# Patient Record
Sex: FEMALE | Race: WHITE | Employment: FULL TIME | URBAN - METROPOLITAN AREA
[De-identification: names, ages, dates, MRNs, and addresses within clinical notes are randomized per-mention and may not be internally consistent; named-entity substitution may affect disease eponyms.]

---

## 2022-02-28 DIAGNOSIS — M25.552 LEFT HIP PAIN: Primary | ICD-10-CM

## 2022-02-28 NOTE — TELEPHONE ENCOUNTER
DIAGNOSIS: left hip pain, a little back pain associated with it/ self ref/ no imaging   APPOINTMENT DATE: 3.2.22   NOTES STATUS DETAILS

## 2022-03-02 ENCOUNTER — OFFICE VISIT (OUTPATIENT)
Dept: ORTHOPEDICS | Facility: CLINIC | Age: 52
End: 2022-03-02
Payer: COMMERCIAL

## 2022-03-02 ENCOUNTER — PRE VISIT (OUTPATIENT)
Dept: ORTHOPEDICS | Facility: CLINIC | Age: 52
End: 2022-03-02

## 2022-03-02 ENCOUNTER — ANCILLARY PROCEDURE (OUTPATIENT)
Dept: GENERAL RADIOLOGY | Facility: CLINIC | Age: 52
End: 2022-03-02
Attending: FAMILY MEDICINE
Payer: COMMERCIAL

## 2022-03-02 DIAGNOSIS — M25.552 LEFT HIP PAIN: ICD-10-CM

## 2022-03-02 DIAGNOSIS — M16.12 PRIMARY OSTEOARTHRITIS OF LEFT HIP: Primary | ICD-10-CM

## 2022-03-02 PROCEDURE — 99203 OFFICE O/P NEW LOW 30 MIN: CPT | Performed by: FAMILY MEDICINE

## 2022-03-02 PROCEDURE — 73502 X-RAY EXAM HIP UNI 2-3 VIEWS: CPT | Performed by: RADIOLOGY

## 2022-03-02 RX ORDER — NAPROXEN 500 MG/1
500 TABLET ORAL 2 TIMES DAILY WITH MEALS
Qty: 60 TABLET | Refills: 0 | Status: ON HOLD | OUTPATIENT
Start: 2022-03-02 | End: 2022-05-05

## 2022-03-02 RX ORDER — OMEGA-3 FATTY ACIDS/FISH OIL 300-1000MG
200 CAPSULE ORAL EVERY 4 HOURS PRN
Status: ON HOLD | COMMUNITY
End: 2022-05-05

## 2022-03-02 NOTE — LETTER
Date:March 3, 2022      Patient was self referred, no letter generated. Do not send.        [facility] Health Information

## 2022-03-02 NOTE — PROGRESS NOTES
Sports Medicine Clinic Visit    PCP: No primary care provider on file.    Milvia Carrasquillo is a 51 year old female who is seen  as self referral presenting with left hip pain. She reports that as a child she did injure her left hip, but is unaware of details, and did not grow up with hip disability or discomfort as young adult.  She is from [place].    Patient indicates for the last year she has had increasing left-sided groin discomfort with weightbearing, and difficulties getting out of a car to weight bear on the left hip.  Feels more prominent in the last 2 to 3 months.  She mentions over the last 3 to 4 years she has had intermittent left-sided groin pain, but usually short-lived.  She has noted for the past few years that  its more difficult to rotate her hip to get her socks on.  She was seen by primary provider in 2018 for left-sided buttock discomfort and diagnosed with piriformis syndrome.  She has seen a chiropractor without improvement in her pain.    She has tried no over-the-counter pain medicines.  She has no history of stomach intolerance of medicines or of kidney disease.    Injury: None.     Location of Pain: left hip  Duration of Pain: 2-3  Month(s) pain has worsen.   Rating of Pain: 10/10  Pain is better with: chiropractic care  Pain is worse with: walking.   Treatment so far consists of: Ibuprofen, Rest and chiropractic care.       There were no vitals taken for this visit.          PMH:  Hypertension   Insulin resistance    Non morbid obesity due to excess calories    Piriformis syndrome of left side 2018  H/o c/section x 2  Carpal tunnel syndrome, right      Active problem list:  There is no problem list on file for this patient.      FH:  No family history on file.    SH:  Social History     Socioeconomic History     Marital status:      Spouse name: Not on file     Number of children: Not on file     Years of education: Not on file     Highest education level: Not on file    Occupational History     Not on file   Tobacco Use     Smoking status: Not on file     Smokeless tobacco: Not on file   Substance and Sexual Activity     Alcohol use: Not on file     Drug use: Not on file     Sexual activity: Not on file   Other Topics Concern     Not on file   Social History Narrative     Not on file     Social Determinants of Health     Financial Resource Strain: Not on file   Food Insecurity: Not on file   Transportation Needs: Not on file   Physical Activity: Not on file   Stress: Not on file   Social Connections: Not on file   Intimate Partner Violence: Not on file   Housing Stability: Not on file       MEDS:  See EMR, reviewed  ALL:  See EMR, reviewed    REVIEW OF SYSTEMS:  CONSTITUTIONAL:NEGATIVE for fever, chills, change in weight  INTEGUMENTARY/SKIN: NEGATIVE for worrisome rashes, moles or lesions  EYES: NEGATIVE for vision changes or irritation  ENT/MOUTH: NEGATIVE for ear, mouth and throat problems  RESP:NEGATIVE for significant cough or SOB  BREAST: NEGATIVE for masses, tenderness or discharge  CV: NEGATIVE for chest pain, palpitations or peripheral edema  GI: NEGATIVE for nausea, abdominal pain, heartburn, or change in bowel habits  :NEGATIVE for frequency, dysuria, or hematuria  :NEGATIVE for frequency, dysuria, or hematuria  NEURO: NEGATIVE for weakness, dizziness or paresthesias  ENDOCRINE: NEGATIVE for temperature intolerance, skin/hair changes  HEME/ALLERGY/IMMUNE: NEGATIVE for bleeding problems  PSYCHIATRIC: NEGATIVE for changes in mood or affect      Objective she has normal range of motion to the right hip to internal rotation, external rotation and abduction.  She has severely limited range of motion at the left hip with discomfort of both internal and external rotation.  Straight leg raise is negative.  Strength is intact at hip, knee, ankle and foot.  Appropriate in conversation and affect.    We went over x-rays of her left hip that show severe degenerative joint  disease.  She appears to have an occluder device in her bilateral fallopian tubes.      Assessment: Severe left-sided hip DJD    Plan: She would like to discuss options with the hip surgeon.  Orthopedic surgery referral placed.  She would like to try some naproxen, 500 mg taken with food, twice daily.

## 2022-03-02 NOTE — LETTER
3/2/2022      RE: Milvia Carrasquillo  [address]  [facility] [address]       Sports Medicine Clinic Visit    PCP: No primary care provider on file.    Milvia Carrasquillo is a 51 year old female who is seen  as self referral presenting with left hip pain. She reports that as a child she did injure her left hip, but is unaware of details, and did not grow up with hip disability or discomfort as young adult.  She is from [place].    Patient indicates for the last year she has had increasing left-sided groin discomfort with weightbearing, and difficulties getting out of a car to weight bear on the left hip.  Feels more prominent in the last 2 to 3 months.  She mentions over the last 3 to 4 years she has had intermittent left-sided groin pain, but usually short-lived.  She has noted for the past few years that  its more difficult to rotate her hip to get her socks on.  She was seen by primary provider in 2018 for left-sided buttock discomfort and diagnosed with piriformis syndrome.  She has seen a chiropractor without improvement in her pain.    She has tried no over-the-counter pain medicines.  She has no history of stomach intolerance of medicines or of kidney disease.    Injury: None.     Location of Pain: left hip  Duration of Pain: 2-3  Month(s) pain has worsen.   Rating of Pain: 10/10  Pain is better with: chiropractic care  Pain is worse with: walking.   Treatment so far consists of: Ibuprofen, Rest and chiropractic care.       There were no vitals taken for this visit.          PMH:  Hypertension   Insulin resistance    Non morbid obesity due to excess calories    Piriformis syndrome of left side 2018  H/o c/section x 2  Carpal tunnel syndrome, right      Active problem list:  There is no problem list on file for this patient.      FH:  No family history on file.    SH:  Social History     Socioeconomic History     Marital status:      Spouse name: Not on file     Number of children:  Not on file     Years of education: Not on file     Highest education level: Not on file   Occupational History     Not on file   Tobacco Use     Smoking status: Not on file     Smokeless tobacco: Not on file   Substance and Sexual Activity     Alcohol use: Not on file     Drug use: Not on file     Sexual activity: Not on file   Other Topics Concern     Not on file   Social History Narrative     Not on file     Social Determinants of Health     Financial Resource Strain: Not on file   Food Insecurity: Not on file   Transportation Needs: Not on file   Physical Activity: Not on file   Stress: Not on file   Social Connections: Not on file   Intimate Partner Violence: Not on file   Housing Stability: Not on file       MEDS:  See EMR, reviewed  ALL:  See EMR, reviewed    REVIEW OF SYSTEMS:  CONSTITUTIONAL:NEGATIVE for fever, chills, change in weight  INTEGUMENTARY/SKIN: NEGATIVE for worrisome rashes, moles or lesions  EYES: NEGATIVE for vision changes or irritation  ENT/MOUTH: NEGATIVE for ear, mouth and throat problems  RESP:NEGATIVE for significant cough or SOB  BREAST: NEGATIVE for masses, tenderness or discharge  CV: NEGATIVE for chest pain, palpitations or peripheral edema  GI: NEGATIVE for nausea, abdominal pain, heartburn, or change in bowel habits  :NEGATIVE for frequency, dysuria, or hematuria  :NEGATIVE for frequency, dysuria, or hematuria  NEURO: NEGATIVE for weakness, dizziness or paresthesias  ENDOCRINE: NEGATIVE for temperature intolerance, skin/hair changes  HEME/ALLERGY/IMMUNE: NEGATIVE for bleeding problems  PSYCHIATRIC: NEGATIVE for changes in mood or affect      Objective she has normal range of motion to the right hip to internal rotation, external rotation and abduction.  She has severely limited range of motion at the left hip with discomfort of both internal and external rotation.  Straight leg raise is negative.  Strength is intact at hip, knee, ankle and foot.  Appropriate in conversation and  affect.    We went over x-rays of her left hip that show severe degenerative joint disease.  She appears to have an occluder device in her bilateral fallopian tubes.      Assessment: Severe left-sided hip DJD    Plan: She would like to discuss options with the hip surgeon.  Orthopedic surgery referral placed.  She would like to try some naproxen, 500 mg taken with food, twice daily.                  Joe Franklin MD

## 2022-03-03 ENCOUNTER — ANCILLARY PROCEDURE (OUTPATIENT)
Dept: GENERAL RADIOLOGY | Facility: CLINIC | Age: 52
End: 2022-03-03
Payer: COMMERCIAL

## 2022-03-03 ENCOUNTER — OFFICE VISIT (OUTPATIENT)
Dept: ORTHOPEDICS | Facility: CLINIC | Age: 52
End: 2022-03-03
Payer: COMMERCIAL

## 2022-03-03 ENCOUNTER — PRE VISIT (OUTPATIENT)
Dept: ORTHOPEDICS | Facility: CLINIC | Age: 52
End: 2022-03-03
Payer: COMMERCIAL

## 2022-03-03 ENCOUNTER — LAB (OUTPATIENT)
Dept: LAB | Facility: CLINIC | Age: 52
End: 2022-03-03
Payer: COMMERCIAL

## 2022-03-03 VITALS — HEIGHT: 65 IN | BODY MASS INDEX: 33.82 KG/M2 | WEIGHT: 203 LBS

## 2022-03-03 DIAGNOSIS — M16.12 PRIMARY OSTEOARTHRITIS OF LEFT HIP: ICD-10-CM

## 2022-03-03 DIAGNOSIS — M16.12 PRIMARY OSTEOARTHRITIS OF LEFT HIP: Primary | ICD-10-CM

## 2022-03-03 PROCEDURE — 72170 X-RAY EXAM OF PELVIS: CPT | Performed by: RADIOLOGY

## 2022-03-03 PROCEDURE — U0005 INFEC AGEN DETEC AMPLI PROBE: HCPCS | Mod: 90 | Performed by: PATHOLOGY

## 2022-03-03 PROCEDURE — 99204 OFFICE O/P NEW MOD 45 MIN: CPT | Mod: GC | Performed by: ORTHOPAEDIC SURGERY

## 2022-03-03 PROCEDURE — U0003 INFECTIOUS AGENT DETECTION BY NUCLEIC ACID (DNA OR RNA); SEVERE ACUTE RESPIRATORY SYNDROME CORONAVIRUS 2 (SARS-COV-2) (CORONAVIRUS DISEASE [COVID-19]), AMPLIFIED PROBE TECHNIQUE, MAKING USE OF HIGH THROUGHPUT TECHNOLOGIES AS DESCRIBED BY CMS-2020-01-R: HCPCS | Mod: 90 | Performed by: PATHOLOGY

## 2022-03-03 PROCEDURE — 99000 SPECIMEN HANDLING OFFICE-LAB: CPT | Performed by: PATHOLOGY

## 2022-03-03 ASSESSMENT — KOOS JR
HOW SEVERE IS YOUR KNEE STIFFNESS AFTER FIRST WAKING IN MORNING: SEVERE
RISING FROM SITTING: MODERATE
GOING UP OR DOWN STAIRS: SEVERE
BENDING TO THE FLOOR TO PICK UP OBJECT: MILD
TWISING OR PIVOTING ON KNEE: SEVERE
KOOS JR SCORING: 50.01
STANDING UPRIGHT: SEVERE

## 2022-03-03 NOTE — NURSING NOTE
Reason For Visit:   Chief Complaint   Patient presents with     Consult     1 year of left hip pain. No injury. No injections.       There were no vitals taken for this visit.         Jeniffer Marshall, ATC

## 2022-03-03 NOTE — NURSING NOTE
Teaching Flowsheet   Relevant Diagnosis: L GOVIND  Teaching Topic: L GOVIND     RN Note: Pt and  are calm and cooperative, asking questions appropriately. Pt was instructed on pre-surgical packet requirements including H&P, COVID-19 test, NPO, and pre-surgical scrub. Pt and  verbalized understanding. Pt will schedule H&P tomorrow c PCP, COVID test at clinic, packet and scrub given to pt. Pt would like to have surgery on Monday 3/7/22 c Dr. Genao.    Person(s) involved in teaching:   Patient and      Motivation Level:  Asks Questions: Yes  Eager to Learn: Yes  Cooperative: Yes  Receptive (willing/able to accept information): Yes  Any cultural factors/Latter day beliefs that may influence understanding or compliance? No     Patient demonstrates understanding of the following:  Reason for the appointment, diagnosis and treatment plan: Yes  Knowledge of proper use of medications and conditions for which they are ordered (with special attention to potential side effects or drug interactions): Yes  Which situations necessitate calling provider and whom to contact: Yes    Proper use and care of (medical equip, care aids, etc.): Yes  Nutritional needs and diet plan: Yes  Pain management techniques: Yes  Wound Care: Yes  How and/when to access community resources: Yes    Jose Juan Garvey, RNCC

## 2022-03-03 NOTE — LETTER
Date:April 6, 2022      Patient was self referred, no letter generated. Do not send.        [facility] Health Information

## 2022-03-03 NOTE — LETTER
3/3/2022         RE: Milvia Carrasquillo  [address] [place] [address]        Dear Colleague,    Thank you for referring your patient, Milvia Carrasquillo, to the [facility] [place]. Please see a copy of my visit note below.    Orthopaedic Surgery History & Physical  March 3, 2022    REFERRING PHYSICIAN: Referred Self   PRIMARY CARE PHYSICIAN: No primary care provider on file.     Chief Complaint:   Consult (1 year of left hip pain. No injury. No injections.)      History of Present Illness:     Milvia Carrasquillo is a 51 year old Malian-speaking female w/ BMI 34 who presents for evaluation of left hip pain.     She has had left hip pain for the last 3 to 4 years.  It is worsened over the last year, severely so in the last 2 to 3 months.  The pain is localized to her posterior buttock and anterior groin.  The pain does not radiate.  At its worst it is 10 out of 10 in severity.  It is worse with activity and better with rest.  Treatment at this time has included working with a chiropractor for piriformis syndrome.  She has not had any injections or surgery on the hip.  She has a history of left hip injury remotely as a child, though she does not recall the circumstances of this and did not have any surgery.    Is a nonsmoker, works as the  at a . HgbA1c was 6.0 in 2018.      Past Medical History:   No past medical history on file.    Past Surgical History:   No past surgical history on file.    Social History:     Social History     Tobacco Use     Smoking status: Never Smoker     Smokeless tobacco: Never Used   Substance Use Topics     Alcohol use: Not on file       Family History:   No family history on file.    Allergies:   No Known Allergies    Medications:     Current Outpatient Medications   Medication     ibuprofen (ADVIL/MOTRIN) 200 MG capsule     naproxen (NAPROSYN) 500 MG tablet     No current facility-administered  "medications for this visit.        Review of Systems:     A 10 point ROS was performed and reviewed. Specific responses to these questions are noted at the end of the document.    Physical Exam:   On physical examination the patient appears the stated age, is in no acute distress, affec is appropriate, and breathing is non-labored.  Vitals are documented in the EMR and have been reviewed:  Ht 1.638 m (5' 4.5\")   Wt 92.1 kg (203 lb)   BMI 34.31 kg/m      Rises from chair: yes  Gait: antalgic with shortened L-sided stance phase  Trendelenburg test: yes  Gains the exam table: yes    RIGHT hip subjective:  Abd: 30  Add: 30  Flexion: 120  IRF: 30  ERF: 45    LEFT hip subjective:  Flexion: <90 limited by pain  IRF: 20  ERF: 20  Palpable DP/PT pulses  Fires EHL/FHL/TA/GSC    Imaging:   AP pelvis and left hip AP and frog-leg lateral was obtained yesterday and reviewed today in clinic.  The patient has severe Tonnis grade 3 left hip OA with superior migration and asphericity of the femoral head as well as obliteration of the joint space, bone cyst formation, and osteophyte formation.    Assessment and Plan:   Assessment:  51 year old female with L hip severe end-stage OA. Discussed op vs nonop management. Patient interested in pursuing surgery in the form of GOVIND.     Plan:   Will schedule for L GOVIND.    Seen and d/w Dr. Chadwick Mccullough MD  Orthopaedic Surgery, PGY-4  Pager: [phone]      We spent twenty minutes discussing total hip arthroplasty.  We discussed the implants, the procedure, the risks and benefits, and the post-operative course.  We discussed blood clots, blood clots to the lungs, injury to blood vessels and nerves, dislocation, infection, and leg length difference.  All the patients questions were answered to the best of my ability.    I have personally examined this patient and have reviewed the clinical presentation and progress note with the resident. I agree with the treatment plan as outlined. " The plan was formulated with the resident on the day of the resident's dictation.          Again, thank you for allowing me to participate in the care of your patient.        Sincerely,        Joe Genao MD

## 2022-03-03 NOTE — PROGRESS NOTES
Orthopaedic Surgery History & Physical  March 3, 2022    REFERRING PHYSICIAN: Referred Self   PRIMARY CARE PHYSICIAN: No primary care provider on file.     Chief Complaint:   Consult (1 year of left hip pain. No injury. No injections.)      History of Present Illness:     Milvia Carrasquillo is a 51 year old Greenlandic-speaking female w/ BMI 34 who presents for evaluation of left hip pain.     She has had left hip pain for the last 3 to 4 years.  It is worsened over the last year, severely so in the last 2 to 3 months.  The pain is localized to her posterior buttock and anterior groin.  The pain does not radiate.  At its worst it is 10 out of 10 in severity.  It is worse with activity and better with rest.  Treatment at this time has included working with a chiropractor for piriformis syndrome.  She has not had any injections or surgery on the hip.  She has a history of left hip injury remotely as a child, though she does not recall the circumstances of this and did not have any surgery.    Is a nonsmoker, works as the  at a . HgbA1c was 6.0 in 2018.      Past Medical History:   No past medical history on file.    Past Surgical History:   No past surgical history on file.    Social History:     Social History     Tobacco Use     Smoking status: Never Smoker     Smokeless tobacco: Never Used   Substance Use Topics     Alcohol use: Not on file       Family History:   No family history on file.    Allergies:   No Known Allergies    Medications:     Current Outpatient Medications   Medication     ibuprofen (ADVIL/MOTRIN) 200 MG capsule     naproxen (NAPROSYN) 500 MG tablet     No current facility-administered medications for this visit.        Review of Systems:     A 10 point ROS was performed and reviewed. Specific responses to these questions are noted at the end of the document.    Physical Exam:   On physical examination the patient appears the stated age, is in no acute distress, affec  "is appropriate, and breathing is non-labored.  Vitals are documented in the EMR and have been reviewed:  Ht 1.638 m (5' 4.5\")   Wt 92.1 kg (203 lb)   BMI 34.31 kg/m      Rises from chair: yes  Gait: antalgic with shortened L-sided stance phase  Trendelenburg test: yes  Gains the exam table: yes    RIGHT hip subjective:  Abd: 30  Add: 30  Flexion: 120  IRF: 30  ERF: 45    LEFT hip subjective:  Flexion: <90 limited by pain  IRF: 20  ERF: 20  Palpable DP/PT pulses  Fires EHL/FHL/TA/GSC    Imaging:   AP pelvis and left hip AP and frog-leg lateral was obtained yesterday and reviewed today in clinic.  The patient has severe Tonnis grade 3 left hip OA with superior migration and asphericity of the femoral head as well as obliteration of the joint space, bone cyst formation, and osteophyte formation.    Assessment and Plan:   Assessment:  51 year old female with L hip severe end-stage OA. Discussed op vs nonop management. Patient interested in pursuing surgery in the form of GOVIND.     Plan:   Will schedule for L GOVIND.    Seen and d/w Dr. Chadwick Mccullough MD  Orthopaedic Surgery, PGY-4  Pager: [phone]    "

## 2022-03-04 ENCOUNTER — TELEPHONE (OUTPATIENT)
Dept: ORTHOPEDICS | Facility: CLINIC | Age: 52
End: 2022-03-04
Payer: COMMERCIAL

## 2022-03-04 DIAGNOSIS — Z11.59 ENCOUNTER FOR SCREENING FOR OTHER VIRAL DISEASES: Primary | ICD-10-CM

## 2022-03-04 LAB — SARS-COV-2 RNA RESP QL NAA+PROBE: NEGATIVE

## 2022-03-04 NOTE — TELEPHONE ENCOUNTER
RN called pt's  to discuss upcoming surgery. RN explained that unfortunately, there was no OR time for Mon (3/7). However, there is OR time for (3/30). Pt elected to take the 30th. Pt has a H&P c PCP on 3/7. Will reschedule a COVID test 3-4 days before surgery.     Jose Juan Garvey, JANAYCC

## 2022-03-07 ENCOUNTER — OFFICE VISIT (OUTPATIENT)
Dept: INTERNAL MEDICINE | Facility: CLINIC | Age: 52
End: 2022-03-07
Payer: COMMERCIAL

## 2022-03-07 ENCOUNTER — HOSPITAL ENCOUNTER (OUTPATIENT)
Dept: CARDIOLOGY | Facility: CLINIC | Age: 52
Discharge: HOME | End: 2022-03-07
Attending: INTERNAL MEDICINE | Admitting: INTERNAL MEDICINE
Payer: COMMERCIAL

## 2022-03-07 ENCOUNTER — LAB (OUTPATIENT)
Dept: LAB | Facility: CLINIC | Age: 52
End: 2022-03-07
Payer: COMMERCIAL

## 2022-03-07 VITALS
WEIGHT: 200 LBS | OXYGEN SATURATION: 98 % | BODY MASS INDEX: 34.15 KG/M2 | DIASTOLIC BLOOD PRESSURE: 99 MMHG | RESPIRATION RATE: 16 BRPM | HEART RATE: 81 BPM | HEIGHT: 64 IN | SYSTOLIC BLOOD PRESSURE: 196 MMHG

## 2022-03-07 DIAGNOSIS — R03.0 ELEVATED BP WITHOUT DIAGNOSIS OF HYPERTENSION: ICD-10-CM

## 2022-03-07 DIAGNOSIS — Z01.818 PRE-OPERATIVE EXAMINATION: ICD-10-CM

## 2022-03-07 DIAGNOSIS — Z01.818 PRE-OPERATIVE EXAMINATION: Primary | ICD-10-CM

## 2022-03-07 LAB
ANION GAP SERPL CALCULATED.3IONS-SCNC: 9 MMOL/L (ref 3–14)
ATRIAL RATE - MUSE: 83 BPM
BUN SERPL-MCNC: 15 MG/DL (ref 7–30)
CALCIUM SERPL-MCNC: 9.4 MG/DL (ref 8.5–10.1)
CHLORIDE BLD-SCNC: 103 MMOL/L (ref 94–109)
CO2 SERPL-SCNC: 25 MMOL/L (ref 20–32)
CREAT SERPL-MCNC: 0.77 MG/DL (ref 0.52–1.04)
DIASTOLIC BLOOD PRESSURE - MUSE: NORMAL MMHG
GFR SERPL CREATININE-BSD FRML MDRD: >90 ML/MIN/1.73M2
GLUCOSE BLD-MCNC: 85 MG/DL (ref 70–99)
HCG UR QL: NEGATIVE
INTERPRETATION ECG - MUSE: NORMAL
P AXIS - MUSE: 31 DEGREES
POTASSIUM BLD-SCNC: 4.5 MMOL/L (ref 3.4–5.3)
PR INTERVAL - MUSE: 170 MS
QRS DURATION - MUSE: 84 MS
QT - MUSE: 378 MS
QTC - MUSE: 444 MS
R AXIS - MUSE: 19 DEGREES
SODIUM SERPL-SCNC: 137 MMOL/L (ref 133–144)
SYSTOLIC BLOOD PRESSURE - MUSE: NORMAL MMHG
T AXIS - MUSE: 102 DEGREES
TSH SERPL DL<=0.005 MIU/L-ACNC: 2.32 MU/L (ref 0.4–4)
VENTRICULAR RATE- MUSE: 83 BPM

## 2022-03-07 PROCEDURE — 93788 AMBL BP MNTR W/SW A/R: CPT

## 2022-03-07 PROCEDURE — 93790 AMBL BP MNTR W/SW I&R: CPT | Performed by: INTERNAL MEDICINE

## 2022-03-07 PROCEDURE — 36415 COLL VENOUS BLD VENIPUNCTURE: CPT | Performed by: PATHOLOGY

## 2022-03-07 PROCEDURE — 93786 AMBL BP MNTR W/SW REC ONLY: CPT

## 2022-03-07 PROCEDURE — 99204 OFFICE O/P NEW MOD 45 MIN: CPT | Performed by: INTERNAL MEDICINE

## 2022-03-07 PROCEDURE — 84443 ASSAY THYROID STIM HORMONE: CPT | Performed by: PATHOLOGY

## 2022-03-07 PROCEDURE — 80048 BASIC METABOLIC PNL TOTAL CA: CPT | Performed by: PATHOLOGY

## 2022-03-07 PROCEDURE — 81025 URINE PREGNANCY TEST: CPT | Performed by: PATHOLOGY

## 2022-03-07 ASSESSMENT — PAIN SCALES - GENERAL: PAINLEVEL: SEVERE PAIN (7)

## 2022-03-07 NOTE — LETTER
3/7/2022      RE: Milvia Carrasquillo  [address] [place] [address]       Milvia Carrasquillo is 51 year old female here at the request of Dr. Genao for cardiovascular, pulmonary, and perioperative risk assessment prior to surgery.The intended surgical procedure is total hip arthroplasty. A copy of this note will be sent to the surgeon.    PMH  Hypertension with past treatment, none now (stopped 8 yrs ago). HTN during pregnancy.  Seasonal allergies    PAST SURGICAL HISTORY   Section X 2- no complications      Current Outpatient Medications   Medication     ibuprofen (ADVIL/MOTRIN) 200 MG capsule     naproxen (NAPROSYN) 500 MG tablet     No current facility-administered medications for this visit.       There is no immunization history on file for this patient.    This is a MODERATE risk surgery.      HPI:   Reason for surgery: progressive pain and limited ROM/fxn of hip due to osteoarthritis    Cardiovascular Risk:  This patient ambulates without assist. without chest pain. She IS able to climb a flight of stairs without chest pain.    The patient does not have chest pain with exertion    She does not have a history of known cardiac disease.   The patient does not have a history of stroke, and does not have a history of valvular disease.    Pulmonary Risk:  In terms of risk factors for pulmonary complications, the patient does not have a history of lung disease or symptoms. Never smoker.     Perioperative Complications:  The patient does not have a history of bleeding or clotting problems in the past. The patient has not had complications from past surgeries.  The patient does not have a family history of any anesthesia or surgical complications.      ROS:  Constitutional: no fevers, night sweats or unintentional weight change   Eyes: no vision change, diplopia or red eyes   Ears, Nose, Mouth, Throat: no tinnitus or hearing change, no epistaxis or nasal discharge, no oral lesions, throat  "clear   Cardiovascular: no chest pain, palpitations, or pain with walking, no orthopnea or PND   Respiratory: no dyspnea, cough, shortness of breath or wheezing - loud snoring at night  GI: no nausea, vomiting, diarrhea or constipation, no abdominal pain   : no change in urine, no dysuria or hematuria  Musculoskeletal: hip pain   Integumentary: no concerning lesions or moles   Neuro: no loss of strength or sensation, no numbness or tingling, no tremor, no dizziness, no headache   Endo: no polyuria or polydipsia, no temperature intolerance   Heme/Lymph: no concerning bumps, no bleeding problems   Allergy: no environmental allergies   Psych: no depression or anxiety, no sleep problems      PHYSICAL EXAM:  BP (!) 197/96 (BP Location: Right arm, Patient Position: Sitting, Cuff Size: Adult Regular)   Pulse 88   Resp 16   Ht 1.626 m (5' 4\")   Wt 90.7 kg (200 lb)   SpO2 98%   BMI 34.33 kg/m      Wt Readings from Last 1 Encounters:   03/07/22 90.7 kg (200 lb)       Constitutional: no distress, comfortable, pleasant, obese with BMI 34 and neck circ >16 inches  Eyes: anicteric, normal extra-ocular movements   Ears, Nose and Throat: tympanic membranes clear, nose clear and free of lesions, throat clear, neck supple with full range of motion, no thyromegaly. Mallampati score of 3.   Cardiovascular: regular rate and rhythm, normal S1 and S2, no murmurs, rubs or gallops, peripheral pulses full and symmetric   Respiratory: clear to auscultation, no wheezes or crackles, normal breath sounds   Gastrointestinal: positive bowel sounds, nontender  Musculoskeletal: full range of motion, no edema   Skin: no concerning lesions, no jaundice   Neurological: normal strength and sensation, reflexes at patella and biceps normal, normal gait, no tremor   Psychological: appropriate mood       A/P:    The patient with severely elevated blood pressure presents prior to surgery for assessment of perioperative risk. As long as the blood " pressure is under control,  the patient is at LOW risk for cardiovascular complications and at LOW risk for pulmonary complications of this MODERATE risk surgery. Thus, need to rapidly evaluate whether this BP elevation is chronic or acute related to her pain and NSAID use. In addition, her STOP-BANG score of 5 suggests high risk of SUSANNA.     No evidence of functionally compromising cardiac or pulmonary status  The patient is recommended to hold aspirin or NSAIDS for 10 days prior to surgery.  The patient does have severe elevation of BP today which needs further work up and optimal treatment and this can be worked on pre-operatively and as long as reasonable BP control is achieved, it well be safe for the patient to proceed with surgery. Steps to be taken to facilitate this are:    1) Repeat BPs now using 3 measurement protocol--> avg was 196/99 so recommend 24 hr BP monitoring   2) Check BMP, TSH, EKG-->BMP nl, TSH nl, EKG with NSR at 83 bpm. No LVH by voltage criteria. Non-specific ST-Twave abnormalities  3) F/U in short term for initiation of anti-hypertensive therapy if 24 hr monitor confirms HTN  4) Outpatient w/u of SUSANNA after surgery  5) NEEDS A PRIMARY CARE PROVIDER (lives near [facility] so could establish care there)    No food or liquids the morning of surgery.  Call surgeon if develops respiratory illness, fever, or other illness.  Letter sent to requesting surgeon listed above    Laboratory studies:  BMP and TSHReflex  Pregnancy testing was indicated and was negative    Cardiovascular: EKG was indicated based on risk assessment. Non-specific EKG as noted above in bold.     Please contact our office if there are any further questions or information required about this patient.    45 minutes spent on the date of the encounter performing chart review, history and exam, documentation and further activities as noted above.          Ehsan Koehler MD

## 2022-03-07 NOTE — PROGRESS NOTES
Milvia Carrasquillo is 51 year old female here at the request of Dr. Genao for cardiovascular, pulmonary, and perioperative risk assessment prior to surgery.The intended surgical procedure is total hip arthroplasty. A copy of this note will be sent to the surgeon.    PMH  Hypertension with past treatment, none now (stopped 8 yrs ago). HTN during pregnancy.  Seasonal allergies    PAST SURGICAL HISTORY   Section X 2- no complications      Current Outpatient Medications   Medication     ibuprofen (ADVIL/MOTRIN) 200 MG capsule     naproxen (NAPROSYN) 500 MG tablet     No current facility-administered medications for this visit.       There is no immunization history on file for this patient.    This is a MODERATE risk surgery.      HPI:   Reason for surgery: progressive pain and limited ROM/fxn of hip due to osteoarthritis    Cardiovascular Risk:  This patient ambulates without assist. without chest pain. She IS able to climb a flight of stairs without chest pain.    The patient does not have chest pain with exertion    She does not have a history of known cardiac disease.   The patient does not have a history of stroke, and does not have a history of valvular disease.    Pulmonary Risk:  In terms of risk factors for pulmonary complications, the patient does not have a history of lung disease or symptoms. Never smoker.     Perioperative Complications:  The patient does not have a history of bleeding or clotting problems in the past. The patient has not had complications from past surgeries.  The patient does not have a family history of any anesthesia or surgical complications.      ROS:  Constitutional: no fevers, night sweats or unintentional weight change   Eyes: no vision change, diplopia or red eyes   Ears, Nose, Mouth, Throat: no tinnitus or hearing change, no epistaxis or nasal discharge, no oral lesions, throat clear   Cardiovascular: no chest pain, palpitations, or pain with walking, no orthopnea  "or PND   Respiratory: no dyspnea, cough, shortness of breath or wheezing - loud snoring at night  GI: no nausea, vomiting, diarrhea or constipation, no abdominal pain   : no change in urine, no dysuria or hematuria  Musculoskeletal: hip pain   Integumentary: no concerning lesions or moles   Neuro: no loss of strength or sensation, no numbness or tingling, no tremor, no dizziness, no headache   Endo: no polyuria or polydipsia, no temperature intolerance   Heme/Lymph: no concerning bumps, no bleeding problems   Allergy: no environmental allergies   Psych: no depression or anxiety, no sleep problems      PHYSICAL EXAM:  BP (!) 197/96 (BP Location: Right arm, Patient Position: Sitting, Cuff Size: Adult Regular)   Pulse 88   Resp 16   Ht 1.626 m (5' 4\")   Wt 90.7 kg (200 lb)   SpO2 98%   BMI 34.33 kg/m      Wt Readings from Last 1 Encounters:   03/07/22 90.7 kg (200 lb)       Constitutional: no distress, comfortable, pleasant, obese with BMI 34 and neck circ >16 inches  Eyes: anicteric, normal extra-ocular movements   Ears, Nose and Throat: tympanic membranes clear, nose clear and free of lesions, throat clear, neck supple with full range of motion, no thyromegaly. Mallampati score of 3.   Cardiovascular: regular rate and rhythm, normal S1 and S2, no murmurs, rubs or gallops, peripheral pulses full and symmetric   Respiratory: clear to auscultation, no wheezes or crackles, normal breath sounds   Gastrointestinal: positive bowel sounds, nontender  Musculoskeletal: full range of motion, no edema   Skin: no concerning lesions, no jaundice   Neurological: normal strength and sensation, reflexes at patella and biceps normal, normal gait, no tremor   Psychological: appropriate mood       A/P:    The patient with severely elevated blood pressure presents prior to surgery for assessment of perioperative risk. As long as the blood pressure is under control,  the patient is at LOW risk for cardiovascular complications and " at LOW risk for pulmonary complications of this MODERATE risk surgery. Thus, need to rapidly evaluate whether this BP elevation is chronic or acute related to her pain and NSAID use. In addition, her STOP-BANG score of 5 suggests high risk of SUSANNA.     No evidence of functionally compromising cardiac or pulmonary status  The patient is recommended to hold aspirin or NSAIDS for 10 days prior to surgery.  The patient does have severe elevation of BP today which needs further work up and optimal treatment and this can be worked on pre-operatively and as long as reasonable BP control is achieved, it well be safe for the patient to proceed with surgery. Steps to be taken to facilitate this are:    1) Repeat BPs now using 3 measurement protocol--> avg was 196/99 so recommend 24 hr BP monitoring   2) Check BMP, TSH, EKG-->BMP nl, TSH nl, EKG with NSR at 83 bpm. No LVH by voltage criteria. Non-specific ST-Twave abnormalities  3) F/U in short term for initiation of anti-hypertensive therapy if 24 hr monitor confirms HTN  4) Outpatient w/u of SUSANNA after surgery  5) NEEDS A PRIMARY CARE PROVIDER (lives near [facility] so could establish care there)    No food or liquids the morning of surgery.  Call surgeon if develops respiratory illness, fever, or other illness.  Letter sent to requesting surgeon listed above    Laboratory studies:  BMP and TSHReflex  Pregnancy testing was indicated and was negative    Cardiovascular: EKG was indicated based on risk assessment. Non-specific EKG as noted above in bold.     Please contact our office if there are any further questions or information required about this patient.    45 minutes spent on the date of the encounter performing chart review, history and exam, documentation and further activities as noted above.    Claudio Koehler MD  Primary Care Center  [facility]

## 2022-03-07 NOTE — PATIENT INSTRUCTIONS
To schedule an appointment for your twenty-four hour blood pressure monitoring, please call [phone].    Thank you for visiting the Primary Care Center today at the [facility]! The following is some information about our clinic:     Primary Care Center Frequently-Asked Questions    (1) How do I schedule appointments at the [facility]?     Primary Care--to schedule or make changes to an existing appointment, please call our primary care line at [phone].  Labs--to schedule a lab appointment at the [facility] you can use Imperva or call [phone]. If you have a [place] location that is closer to home, you can reach out to that location for scheduling options.   Imaging--if you need to schedule a CT, X-ray, MRI, ultrasound, or other imaging study, you can call [phone] to schedule at the [facility] or any other [facility] imaging location.   Referrals--if a referral to another specialty was ordered you can expect a phone call from their scheduling team. If you have not heard from them in a week, please call us or send us a Imperva message to check the status or get a scheduling number. Please note that this only applies to internal [facility] referrals. If the referral is external you would need to contact their office for scheduling.     (2) I have a question about my visit, who do I contact?     You can call us at the primary care line at [phone] to ask questions about your visit. You can also send a secure message through Imperva, which is reviewed by clinic staff. Please note that Imperva messages have a twenty-four to forty-eight business hour turnaround time and should not be used for urgent concerns.    (3) How will I get the results of my tests?    If you are signed up for Imperva all tests will be released to you within twenty-four hours of resulting. Please allow three to five days for your doctor to review your  results and place a note interpreting the results. If you do not have Primelo[employer] you will receive your results through mail seven to ten business days following the return of the tests. Please note that if there should be any urgent or concerning results that your doctor or their registered nurse will reach out to you the same day as the tests come back. If you have follow up questions about your results or would like to discuss the results in detail please schedule a follow up with your provider either in person or virtually.     (4) How do I get refills of my prescriptions?     You should always first contact your pharmacy for refills of your medications. If submitting a refill request on [employer], please be sure to submit the request only once--repeat requests can cause delays in refill. If you are requesting a NEW medication or a medication related to new symptoms you will need to schedule an appointment with a provider prior to approval. Please note: Routine medication refills have up to one to three business day turnaround whereas controlled substances refills have up to five to seven business day turnaround.    (5) I have new symptoms, what do I do?     If you are having an immediate medical emergency, you should dial 911 for assistance.   For anything urgent that needs to be seen within a few hours to one day you should visit a local urgent care for assistance.  For non-urgent symptoms that need to be seen within a few days to a week you can schedule with an available provider in primary care by going to [employer] or calling [phone].   If you are not sure how serious your symptoms are or you would like to receive medical advice you can always call [phone] to speak with a triage nurse.

## 2022-03-07 NOTE — NURSING NOTE
Milvia Carrasquillo is a 51 year old female patient that presents today in clinic for the following:    Chief Complaint   Patient presents with     Physical     Possible preop     The patient's allergies and medications were reviewed as noted. A set of vitals were recorded as noted without incident. The patient does not have any other questions for the provider.    Yesika Perales, EMT at 12:36 PM on 3/7/2022

## 2022-03-07 NOTE — LETTER
3/7/2022       RE: Milvia Carrasquillo  [address]  [facility] [address]     Dear Colleague,    Thank you for referring your patient, Milvia Carrasquillo, to the [facility] [place] at [facility]. Please see a copy of my visit note below.    Milvia Carrasquillo is 51 year old female here at the request of Dr. Genao for cardiovascular, pulmonary, and perioperative risk assessment prior to surgery.The intended surgical procedure is total hip arthroplasty. A copy of this note will be sent to the surgeon.    PMH  Hypertension with past treatment, none now (stopped 8 yrs ago). HTN during pregnancy.  Seasonal allergies    PAST SURGICAL HISTORY   Section X 2- no complications      Current Outpatient Medications   Medication     ibuprofen (ADVIL/MOTRIN) 200 MG capsule     naproxen (NAPROSYN) 500 MG tablet     No current facility-administered medications for this visit.       There is no immunization history on file for this patient.    This is a MODERATE risk surgery.      HPI:   Reason for surgery: progressive pain and limited ROM/fxn of hip due to osteoarthritis    Cardiovascular Risk:  This patient ambulates without assist. without chest pain. She IS able to climb a flight of stairs without chest pain.    The patient does not have chest pain with exertion    She does not have a history of known cardiac disease.   The patient does not have a history of stroke, and does not have a history of valvular disease.    Pulmonary Risk:  In terms of risk factors for pulmonary complications, the patient does not have a history of lung disease or symptoms. Never smoker.     Perioperative Complications:  The patient does not have a history of bleeding or clotting problems in the past. The patient has not had complications from past surgeries.  The patient does not have a family history of any anesthesia or surgical  "complications.      ROS:  Constitutional: no fevers, night sweats or unintentional weight change   Eyes: no vision change, diplopia or red eyes   Ears, Nose, Mouth, Throat: no tinnitus or hearing change, no epistaxis or nasal discharge, no oral lesions, throat clear   Cardiovascular: no chest pain, palpitations, or pain with walking, no orthopnea or PND   Respiratory: no dyspnea, cough, shortness of breath or wheezing - loud snoring at night  GI: no nausea, vomiting, diarrhea or constipation, no abdominal pain   : no change in urine, no dysuria or hematuria  Musculoskeletal: hip pain   Integumentary: no concerning lesions or moles   Neuro: no loss of strength or sensation, no numbness or tingling, no tremor, no dizziness, no headache   Endo: no polyuria or polydipsia, no temperature intolerance   Heme/Lymph: no concerning bumps, no bleeding problems   Allergy: no environmental allergies   Psych: no depression or anxiety, no sleep problems    PHYSICAL EXAM:  BP (!) 197/96 (BP Location: Right arm, Patient Position: Sitting, Cuff Size: Adult Regular)   Pulse 88   Resp 16   Ht 1.626 m (5' 4\")   Wt 90.7 kg (200 lb)   SpO2 98%   BMI 34.33 kg/m      Wt Readings from Last 1 Encounters:   03/07/22 90.7 kg (200 lb)     Constitutional: no distress, comfortable, pleasant, obese with BMI 34 and neck circ >16 inches  Eyes: anicteric, normal extra-ocular movements   Ears, Nose and Throat: tympanic membranes clear, nose clear and free of lesions, throat clear, neck supple with full range of motion, no thyromegaly. Mallampati score of 3.   Cardiovascular: regular rate and rhythm, normal S1 and S2, no murmurs, rubs or gallops, peripheral pulses full and symmetric   Respiratory: clear to auscultation, no wheezes or crackles, normal breath sounds   Gastrointestinal: positive bowel sounds, nontender  Musculoskeletal: full range of motion, no edema   Skin: no concerning lesions, no jaundice   Neurological: normal strength and " sensation, reflexes at patella and biceps normal, normal gait, no tremor   Psychological: appropriate mood     A/P:    The patient with severely elevated blood pressure presents prior to surgery for assessment of perioperative risk. As long as the blood pressure is under control,  the patient is at LOW risk for cardiovascular complications and at LOW risk for pulmonary complications of this MODERATE risk surgery. Thus, need to rapidly evaluate whether this BP elevation is chronic or acute related to her pain and NSAID use. In addition, her STOP-BANG score of 5 suggests high risk of SUSANNA.     No evidence of functionally compromising cardiac or pulmonary status  The patient is recommended to hold aspirin or NSAIDS for 10 days prior to surgery.  The patient does have severe elevation of BP today which needs further work up and optimal treatment and this can be worked on pre-operatively and as long as reasonable BP control is achieved, it well be safe for the patient to proceed with surgery. Steps to be taken to facilitate this are:    1) Repeat BPs now using 3 measurement protocol--> avg was 196/99 so recommend 24 hr BP monitoring   2) Check BMP, TSH, EKG-->BMP nl, TSH nl, EKG with NSR at 83 bpm. No LVH by voltage criteria. Non-specific ST-Twave abnormalities  3) F/U in short term for initiation of anti-hypertensive therapy if 24 hr monitor confirms HTN  4) Outpatient w/u of SUSANNA after surgery  5) NEEDS A PRIMARY CARE PROVIDER (lives near [facility] so could establish care there)    No food or liquids the morning of surgery.  Call surgeon if develops respiratory illness, fever, or other illness.  Letter sent to requesting surgeon listed above    Laboratory studies:  BMP and TSHReflex  Pregnancy testing was indicated and was negative    Cardiovascular: EKG was indicated based on risk assessment. Non-specific EKG as noted above in bold.     Please contact our office if there are any further questions or information  required about this patient.    45 minutes spent on the date of the encounter performing chart review, history and exam, documentation and further activities as noted above.      Again, thank you for allowing me to participate in the care of your patient.      Sincerely,    Ehsan Koehler MD

## 2022-03-11 NOTE — TELEPHONE ENCOUNTER
FUTURE VISIT INFORMATION      SURGERY INFORMATION:    Date: 3/30/22    Location: ur or    Surgeon:  Joe Genao MD    Anesthesia Type:  choice    Procedure: ARTHROPLASTY,LEFT  HIP, TOTAL    Consult: ov 3/3    RECORDS REQUESTED FROM:       Most recent EKG+ Tracing: 3/7/22

## 2022-03-14 ENCOUNTER — OFFICE VISIT (OUTPATIENT)
Dept: INTERNAL MEDICINE | Facility: CLINIC | Age: 52
End: 2022-03-14
Payer: COMMERCIAL

## 2022-03-14 VITALS
RESPIRATION RATE: 16 BRPM | HEART RATE: 80 BPM | SYSTOLIC BLOOD PRESSURE: 184 MMHG | OXYGEN SATURATION: 97 % | BODY MASS INDEX: 34.33 KG/M2 | HEIGHT: 64 IN | DIASTOLIC BLOOD PRESSURE: 90 MMHG

## 2022-03-14 DIAGNOSIS — M25.552 HIP PAIN, LEFT: ICD-10-CM

## 2022-03-14 DIAGNOSIS — I10 SEVERE HYPERTENSION: Primary | ICD-10-CM

## 2022-03-14 PROCEDURE — 99215 OFFICE O/P EST HI 40 MIN: CPT | Performed by: INTERNAL MEDICINE

## 2022-03-14 RX ORDER — AMLODIPINE BESYLATE 5 MG/1
5 TABLET ORAL DAILY
Qty: 30 TABLET | Refills: 3 | Status: SHIPPED | OUTPATIENT
Start: 2022-03-14 | End: 2022-04-11

## 2022-03-14 RX ORDER — TRAMADOL HYDROCHLORIDE 50 MG/1
50 TABLET ORAL EVERY 6 HOURS PRN
Qty: 56 TABLET | Refills: 0 | Status: SHIPPED | OUTPATIENT
Start: 2022-03-14 | End: 2022-03-28

## 2022-03-14 RX ORDER — LOSARTAN POTASSIUM 50 MG/1
50 TABLET ORAL DAILY
Qty: 30 TABLET | Refills: 3 | Status: SHIPPED | OUTPATIENT
Start: 2022-03-14 | End: 2022-04-11

## 2022-03-14 ASSESSMENT — PAIN SCALES - GENERAL: PAINLEVEL: EXTREME PAIN (9)

## 2022-03-14 NOTE — PATIENT INSTRUCTIONS
Weekly phone f/u of BP until under control- Monday appts (ok to add-on)    Thank you for visiting the Primary Care Center today at the [facility]! The following is some information about our clinic:     Primary Care Center Frequently-Asked Questions    (1) How do I schedule appointments at the [facility]?     Primary Care--to schedule or make changes to an existing appointment, please call our primary care line at [phone].  Labs--to schedule a lab appointment at the [facility] you can use Yorn or call [phone]. If you have a [place] location that is closer to home, you can reach out to that location for scheduling options.   Imaging--if you need to schedule a CT, X-ray, MRI, ultrasound, or other imaging study, you can call [phone] to schedule at the [facility] or any other [facility] imaging location.   Referrals--if a referral to another specialty was ordered you can expect a phone call from their scheduling team. If you have not heard from them in a week, please call us or send us a Yorn message to check the status or get a scheduling number. Please note that this only applies to internal [facility] referrals. If the referral is external you would need to contact their office for scheduling.     (2) I have a question about my visit, who do I contact?     You can call us at the primary care line at [phone] to ask questions about your visit. You can also send a secure message through Yorn, which is reviewed by clinic staff. Please note that Yorn messages have a twenty-four to forty-eight business hour turnaround time and should not be used for urgent concerns.    (3) How will I get the results of my tests?    If you are signed up for Yorn all tests will be released to you within twenty-four hours of resulting. Please allow three to five days for your doctor to review your results and place a note  interpreting the results. If you do not have [employer] you will receive your results through mail seven to ten business days following the return of the tests. Please note that if there should be any urgent or concerning results that your doctor or their registered nurse will reach out to you the same day as the tests come back. If you have follow up questions about your results or would like to discuss the results in detail please schedule a follow up with your provider either in person or virtually.     (4) How do I get refills of my prescriptions?     You should always first contact your pharmacy for refills of your medications. If submitting a refill request on [employer], please be sure to submit the request only once--repeat requests can cause delays in refill. If you are requesting a NEW medication or a medication related to new symptoms you will need to schedule an appointment with a provider prior to approval. Please note: Routine medication refills have up to one to three business day turnaround whereas controlled substances refills have up to five to seven business day turnaround.    (5) I have new symptoms, what do I do?     If you are having an immediate medical emergency, you should dial 911 for assistance.   For anything urgent that needs to be seen within a few hours to one day you should visit a local urgent care for assistance.  For non-urgent symptoms that need to be seen within a few days to a week you can schedule with an available provider in primary care by going to Moz or calling [phone].   If you are not sure how serious your symptoms are or you would like to receive medical advice you can always call [phone] to speak with a triage nurse.

## 2022-03-14 NOTE — PROGRESS NOTES
"S) Mrs. Nieto is a 52 yo originally seen for pre-operative assessment prior to a hip replacement. Initial BPs were very high prompting 24 hr ambulatory monitoring which confirmed severe HTN at 199/100 avg. Discussed with Joe Genao, her surgeon, and feel a delay of this elective procedure until BP controlled is the safest course.     O) BP (!) 184/90 (BP Location: Right arm, Patient Position: Sitting, Cuff Size: Adult Large)   Pulse 80   Resp 16   Ht 1.626 m (5' 4\")   SpO2 97%   BMI 34.33 kg/m    Well appearing  Cor RRR no MRG  Lungs clear  No Edema  No Abd bruits    Last Comprehensive Metabolic Panel:  Sodium   Date Value Ref Range Status   03/07/2022 137 133 - 144 mmol/L Final     Potassium   Date Value Ref Range Status   03/07/2022 4.5 3.4 - 5.3 mmol/L Final     Chloride   Date Value Ref Range Status   03/07/2022 103 94 - 109 mmol/L Final     Carbon Dioxide (CO2)   Date Value Ref Range Status   03/07/2022 25 20 - 32 mmol/L Final     Anion Gap   Date Value Ref Range Status   03/07/2022 9 3 - 14 mmol/L Final     Glucose   Date Value Ref Range Status   03/07/2022 85 70 - 99 mg/dL Final     Urea Nitrogen   Date Value Ref Range Status   03/07/2022 15 7 - 30 mg/dL Final     Creatinine   Date Value Ref Range Status   03/07/2022 0.77 0.52 - 1.04 mg/dL Final     GFR Estimate   Date Value Ref Range Status   03/07/2022 >90 >60 mL/min/1.73m2 Final     Comment:     Effective December 21, 2021 eGFRcr in adults is calculated using the 2021 CKD-EPI creatinine equation which includes age and gender (Adriano et al., NEJM, DOI: 10.1056/VQVIpn7888051)     Calcium   Date Value Ref Range Status   03/07/2022 9.4 8.5 - 10.1 mg/dL Final     A/P) 1) Severe HTN. Pain contributing but 24 hr ambulatory shows avg over 24 hrs in 199/100 range. Need control before she can safely proceed to elective surgery. Plan to have her cut her NSAID and use tramadol 50 mg q6h prn while initiating 2 drug therapy with 50 mg losartan and 5 mg " amlodipine (per ACCOMPLISH trial). Given the severity of HTN, 2 drugs at lower doses a good option. Will assess bmp in a week and will follow with weekly phone visits. She will get a home BP monitor (prescribed as DME) and measure daily. SUSANNA evaluation also ordered but don't need results/treatment before surgery. Coordination with Ortho ongoing- expect she can safely have the repair mid-April. Disability parking permit application completed (temporary- 6 mos). Stressed need for a primary care provider close to home to take over BP mgmt.     40 minutes spent on the date of the encounter performing chart review, history and exam, documentation and further activities as noted above.    Claudio Koehler MD  Primary Care Center  [facility]

## 2022-03-14 NOTE — NURSING NOTE
Milvia Carrasquillo is a 51 year old female patient that presents today in clinic for the following:    Chief Complaint   Patient presents with     Hypertension     Patient comes to check on blood pressure.      The patient's allergies and medications were reviewed as noted. A set of vitals were recorded as noted without incident. The patient does not have any other questions for the provider.    Thomas Yoon, EMT at 5:26 PM on 3/14/2022

## 2022-03-15 ENCOUNTER — APPOINTMENT (OUTPATIENT)
Dept: INTERPRETER SERVICES | Facility: CLINIC | Age: 52
End: 2022-03-15
Payer: COMMERCIAL

## 2022-03-15 ENCOUNTER — TELEPHONE (OUTPATIENT)
Dept: ORTHOPEDICS | Facility: CLINIC | Age: 52
End: 2022-03-15
Payer: COMMERCIAL

## 2022-03-15 NOTE — TELEPHONE ENCOUNTER
Patient is scheduled for surgery with Dr. Genao    Spoke with: Milvia    Date of Surgery: 5/4/2022    Location: [facility]    Informed patient they will need an adult  yes    Pre op with Provider n/a    H&P: Scheduled with pcp    Pre-procedure COVID-19 Test: [place] on 5/2/2022    Additional imaging/appointments: n/a    Surgery packet: Given in clinic     Additional comments: n/a

## 2022-03-15 NOTE — TELEPHONE ENCOUNTER
Called and left voicemail for patient about needing to reschedule surgery with Dr. Genao. Gave [phone] as call back number. Yashi message sent to patient.

## 2022-03-17 ENCOUNTER — PRE VISIT (OUTPATIENT)
Dept: SURGERY | Facility: CLINIC | Age: 52
End: 2022-03-17

## 2022-03-21 ENCOUNTER — VIRTUAL VISIT (OUTPATIENT)
Dept: INTERNAL MEDICINE | Facility: CLINIC | Age: 52
End: 2022-03-21
Payer: COMMERCIAL

## 2022-03-21 DIAGNOSIS — M25.552 HIP PAIN, LEFT: ICD-10-CM

## 2022-03-21 DIAGNOSIS — I10 SEVERE HYPERTENSION: Primary | ICD-10-CM

## 2022-03-21 PROCEDURE — 99213 OFFICE O/P EST LOW 20 MIN: CPT | Mod: 95 | Performed by: INTERNAL MEDICINE

## 2022-03-21 NOTE — PROGRESS NOTES
S) PHONE F/U visit today with aid of . Ms. Nieto is a 50 yo woman initially seen for a pre-op and found to have severe HTN necessitating postponement of the procedure to get BP under control. Initiated therapy with amlodipine 5 mg and losartan 50 mg with improvement as noted below. No side effects noted. BMP ordered. Pain is a component (though 24 hr ambulatory BP avg was ~200/100!) and she was taking NSAIDs- was given tramadol to try to eliminate NSAID but didn't tolerate them (tired, fuzzy thinking, etc).     Current Outpatient Medications   Medication     amLODIPine (NORVASC) 5 MG tablet     ibuprofen (ADVIL/MOTRIN) 200 MG capsule     losartan (COZAAR) 50 MG tablet     naproxen (NAPROSYN) 500 MG tablet     traMADol (ULTRAM) 50 MG tablet     No current facility-administered medications for this visit.       O) BP log -   3/15 183/98  4pm, 173/97 8:55 pm  3/16 7 am  177/100, 158/83 11 pm  3/17 158/66 8am, 151/88 11 pm  3/18 126/73 3 am,   3/19 133/76 11:30 pm  3/20  153/87 9 am  3/21  145/85  7:45 am, 167/88 2:30 pm     A/P) 1) Severe HTN. Significant improvement in HTN on current regimen. Continue amlodipine 5 mg/d and losartan 50 mg/d. If needed will go up to 10 mg amlodipine but too soon.   2) Pain. Unable to tolerate the tramadol due to side effects. Minimize NSAIDs and proceed with surgery with close BP monitoring.     20 minutes phone time spent on the date of the encounter. Additional time spent performing chart review, history, documentation and further activities as noted above.    Claudio Koehler MD  Primary Care Center  [facility]

## 2022-03-21 NOTE — NURSING NOTE
Chief Complaint   Patient presents with     RECHECK     Patient calls in for a follow up.         Mayank Vizcarra MA on 3/21/2022 at 2:55 PM

## 2022-03-23 ENCOUNTER — LAB (OUTPATIENT)
Dept: LAB | Facility: CLINIC | Age: 52
End: 2022-03-23
Payer: COMMERCIAL

## 2022-03-23 DIAGNOSIS — I10 SEVERE HYPERTENSION: ICD-10-CM

## 2022-03-23 LAB
ANION GAP SERPL CALCULATED.3IONS-SCNC: 6 MMOL/L (ref 3–14)
BUN SERPL-MCNC: 16 MG/DL (ref 7–30)
CALCIUM SERPL-MCNC: 9.9 MG/DL (ref 8.5–10.1)
CHLORIDE BLD-SCNC: 102 MMOL/L (ref 94–109)
CO2 SERPL-SCNC: 28 MMOL/L (ref 20–32)
CREAT SERPL-MCNC: 0.73 MG/DL (ref 0.52–1.04)
GFR SERPL CREATININE-BSD FRML MDRD: >90 ML/MIN/1.73M2
GLUCOSE BLD-MCNC: 175 MG/DL (ref 70–99)
POTASSIUM BLD-SCNC: 4.1 MMOL/L (ref 3.4–5.3)
SODIUM SERPL-SCNC: 136 MMOL/L (ref 133–144)

## 2022-03-23 PROCEDURE — 36415 COLL VENOUS BLD VENIPUNCTURE: CPT | Performed by: PATHOLOGY

## 2022-03-23 PROCEDURE — 80048 BASIC METABOLIC PNL TOTAL CA: CPT | Performed by: PATHOLOGY

## 2022-03-24 ENCOUNTER — TELEPHONE (OUTPATIENT)
Dept: ORTHOPEDICS | Facility: CLINIC | Age: 52
End: 2022-03-24
Payer: COMMERCIAL

## 2022-03-28 ENCOUNTER — VIRTUAL VISIT (OUTPATIENT)
Dept: INTERNAL MEDICINE | Facility: CLINIC | Age: 52
End: 2022-03-28
Payer: COMMERCIAL

## 2022-03-28 DIAGNOSIS — M25.552 HIP PAIN, LEFT: ICD-10-CM

## 2022-03-28 DIAGNOSIS — I10 SEVERE HYPERTENSION: Primary | ICD-10-CM

## 2022-03-28 PROCEDURE — 99213 OFFICE O/P EST LOW 20 MIN: CPT | Mod: 95 | Performed by: INTERNAL MEDICINE

## 2022-03-28 NOTE — PROGRESS NOTES
S) Ms. Nieto is a 50 yo woman with a history of severe HTN uncovered in a pre-operative risk assessment evaluation. 24 hr ambulatory BP monitoring showed an average BP of ~200/100. We initiated 2 drug therapy with losartan (50 mg/d) and amlodipine (5 mg/d) with 25% drop in BP over the next 2 weeks. Today, we had a video visit to follow up blood pressures and discuss pain management prior to the rescheduled surgery date. Pain control was inadequate and I added tramadol with the goal of reducing NSAID use (contributing to HTN). She did not tolerate this.     O) Video visit. Well appearing  Nl Speech  Nl Respiration  Home BP record reviewed:  150's to 160's systolic (with occasional 170's) over 80-90's.     A/P) Severe HTN now with improved control on losartan and amlodipine but not adequate. BMP reviewed and creatinine and K were normal. Plan to increase amlodipine to 10 mg/d and continue losartan. Pain currently managed with NSAIDs. Need to be careful in combination of ARB and NSAID. Creatinine normal. Surgery rescheduled. SUSANNA evaluation scheduled.     20 minutes spent on the date of the encounter performing chart review, history and exam, documentation and further activities as noted above.    Claudio Koehler MD  Primary Care Center  [facility]

## 2022-04-03 ENCOUNTER — HEALTH MAINTENANCE LETTER (OUTPATIENT)
Age: 52
End: 2022-04-03

## 2022-04-05 NOTE — PROGRESS NOTES
We spent twenty minutes discussing total hip arthroplasty.  We discussed the implants, the procedure, the risks and benefits, and the post-operative course.  We discussed blood clots, blood clots to the lungs, injury to blood vessels and nerves, dislocation, infection, and leg length difference.  All the patients questions were answered to the best of my ability.    I have personally examined this patient and have reviewed the clinical presentation and progress note with the resident. I agree with the treatment plan as outlined. The plan was formulated with the resident on the day of the resident's dictation.

## 2022-04-11 ENCOUNTER — VIRTUAL VISIT (OUTPATIENT)
Dept: INTERNAL MEDICINE | Facility: CLINIC | Age: 52
End: 2022-04-11
Payer: COMMERCIAL

## 2022-04-11 DIAGNOSIS — I10 SEVERE HYPERTENSION: ICD-10-CM

## 2022-04-11 DIAGNOSIS — M25.552 HIP PAIN, LEFT: Primary | ICD-10-CM

## 2022-04-11 PROCEDURE — 99214 OFFICE O/P EST MOD 30 MIN: CPT | Mod: 95 | Performed by: INTERNAL MEDICINE

## 2022-04-11 RX ORDER — AMLODIPINE BESYLATE 10 MG/1
10 TABLET ORAL DAILY
Qty: 90 TABLET | Refills: 3 | Status: SHIPPED | OUTPATIENT
Start: 2022-04-11 | End: 2023-04-24

## 2022-04-11 RX ORDER — LOSARTAN POTASSIUM 100 MG/1
100 TABLET ORAL DAILY
Qty: 90 TABLET | Refills: 3 | Status: SHIPPED | OUTPATIENT
Start: 2022-04-11 | End: 2023-04-24

## 2022-04-11 NOTE — PROGRESS NOTES
S) Ms. Nieto is seen in f/u for severe HTN. BP's now in target range- home readings have shown a nice steady drop with dose titration of amlodipine up to 10 mg/d and losartan up to 100 mg/d. Current BPs in 120-140 SBP range over 70s DBP. No changes. Surgery OK for 5/4/2022.   O) Well appearing by Video today. Nl mentation.  A/P) 1) Severe HTN. Continue amlodipine 10 mg/d and losartan 100 mg/d. F/U in 3 mos. OK for surgery. Avoid NSAIDs.  2) Severe hip OA. GOVIND scheduled with Dr. Genao for 5/4/22.   3) High risk of SUSANNA- scheduled for polysomnogram next month.  4) Primary care. She has asked me to be her primary care doctor and I'm happy to work with her on this! Will see her back in 3 mos.     20 minutes spent on the date of the encounter in the video visit and performing chart review, history and exam, documentation and further activities as noted above.    Claudio Koehler MD  Primary Care Center  [facility]

## 2022-04-11 NOTE — NURSING NOTE
Milvia Carrasquillo is a 51 year old female patient who is being evaluated via a billable video visit. The following are the primary complaints of the patient:     Chief Complaint   Patient presents with     RECHECK     Two week follow-up     DOLLY Leyva at 2:47 PM on 4/11/2022

## 2022-04-11 NOTE — H&P (VIEW-ONLY)
S) Ms. Nieto is seen in f/u for severe HTN. BP's now in target range- home readings have shown a nice steady drop with dose titration of amlodipine up to 10 mg/d and losartan up to 100 mg/d. Current BPs in 120-140 SBP range over 70s DBP. No changes. Surgery OK for 5/4/2022.   O) Well appearing by Video today. Nl mentation.  A/P) 1) Severe HTN. Continue amlodipine 10 mg/d and losartan 100 mg/d. F/U in 3 mos. OK for surgery. Avoid NSAIDs.  2) Severe hip OA. GOVIND scheduled with Dr. Genao for 5/4/22.   3) High risk of SUSANNA- scheduled for polysomnogram next month.  4) Primary care. She has asked me to be her primary care doctor and I'm happy to work with her on this! Will see her back in 3 mos.     20 minutes spent on the date of the encounter in the video visit and performing chart review, history and exam, documentation and further activities as noted above.    Claduio Koehler MD  Primary Care Center  [facility]

## 2022-05-02 ENCOUNTER — LAB (OUTPATIENT)
Dept: LAB | Facility: OTHER | Age: 52
End: 2022-05-02
Attending: ORTHOPAEDIC SURGERY
Payer: COMMERCIAL

## 2022-05-02 DIAGNOSIS — Z11.59 ENCOUNTER FOR SCREENING FOR OTHER VIRAL DISEASES: ICD-10-CM

## 2022-05-02 PROCEDURE — U0005 INFEC AGEN DETEC AMPLI PROBE: HCPCS

## 2022-05-02 PROCEDURE — U0003 INFECTIOUS AGENT DETECTION BY NUCLEIC ACID (DNA OR RNA); SEVERE ACUTE RESPIRATORY SYNDROME CORONAVIRUS 2 (SARS-COV-2) (CORONAVIRUS DISEASE [COVID-19]), AMPLIFIED PROBE TECHNIQUE, MAKING USE OF HIGH THROUGHPUT TECHNOLOGIES AS DESCRIBED BY CMS-2020-01-R: HCPCS

## 2022-05-03 ENCOUNTER — ANESTHESIA EVENT (OUTPATIENT)
Dept: SURGERY | Facility: CLINIC | Age: 52
End: 2022-05-03
Payer: COMMERCIAL

## 2022-05-03 LAB — SARS-COV-2 RNA RESP QL NAA+PROBE: NEGATIVE

## 2022-05-04 ENCOUNTER — OFFICE VISIT (OUTPATIENT)
Dept: INTERPRETER SERVICES | Facility: CLINIC | Age: 52
End: 2022-05-04
Attending: ORTHOPAEDIC SURGERY
Payer: COMMERCIAL

## 2022-05-04 ENCOUNTER — HOSPITAL ENCOUNTER (OUTPATIENT)
Facility: CLINIC | Age: 52
Discharge: HOME HEALTH | End: 2022-05-05
Attending: ORTHOPAEDIC SURGERY | Admitting: ORTHOPAEDIC SURGERY
Payer: COMMERCIAL

## 2022-05-04 ENCOUNTER — APPOINTMENT (OUTPATIENT)
Dept: PHYSICAL THERAPY | Facility: CLINIC | Age: 52
End: 2022-05-04
Attending: ORTHOPAEDIC SURGERY
Payer: COMMERCIAL

## 2022-05-04 ENCOUNTER — APPOINTMENT (OUTPATIENT)
Dept: GENERAL RADIOLOGY | Facility: CLINIC | Age: 52
End: 2022-05-04
Payer: COMMERCIAL

## 2022-05-04 ENCOUNTER — ANESTHESIA (OUTPATIENT)
Dept: SURGERY | Facility: CLINIC | Age: 52
End: 2022-05-04
Payer: COMMERCIAL

## 2022-05-04 DIAGNOSIS — M16.12 OSTEOARTHRITIS OF LEFT HIP, UNSPECIFIED OSTEOARTHRITIS TYPE: Primary | ICD-10-CM

## 2022-05-04 LAB
ERYTHROCYTE [DISTWIDTH] IN BLOOD BY AUTOMATED COUNT: 16.1 % (ref 10–15)
GLUCOSE BLDC GLUCOMTR-MCNC: 120 MG/DL (ref 70–99)
HCT VFR BLD AUTO: 41.2 % (ref 35–47)
HGB BLD-MCNC: 13.1 G/DL (ref 11.7–15.7)
MCH RBC QN AUTO: 25.5 PG (ref 26.5–33)
MCHC RBC AUTO-ENTMCNC: 31.8 G/DL (ref 31.5–36.5)
MCV RBC AUTO: 80 FL (ref 78–100)
PLATELET # BLD AUTO: 274 10E3/UL (ref 150–450)
RBC # BLD AUTO: 5.14 10E6/UL (ref 3.8–5.2)
WBC # BLD AUTO: 10 10E3/UL (ref 4–11)

## 2022-05-04 PROCEDURE — T1013 SIGN LANG/ORAL INTERPRETER: HCPCS | Mod: U3

## 2022-05-04 PROCEDURE — 250N000011 HC RX IP 250 OP 636: Performed by: ANESTHESIOLOGY

## 2022-05-04 PROCEDURE — 250N000011 HC RX IP 250 OP 636: Performed by: ORTHOPAEDIC SURGERY

## 2022-05-04 PROCEDURE — 250N000013 HC RX MED GY IP 250 OP 250 PS 637

## 2022-05-04 PROCEDURE — 36415 COLL VENOUS BLD VENIPUNCTURE: CPT | Performed by: ANESTHESIOLOGY

## 2022-05-04 PROCEDURE — 258N000003 HC RX IP 258 OP 636: Performed by: ORTHOPAEDIC SURGERY

## 2022-05-04 PROCEDURE — 85027 COMPLETE CBC AUTOMATED: CPT | Performed by: ANESTHESIOLOGY

## 2022-05-04 PROCEDURE — 250N000011 HC RX IP 250 OP 636: Performed by: NURSE ANESTHETIST, CERTIFIED REGISTERED

## 2022-05-04 PROCEDURE — 250N000025 HC SEVOFLURANE, PER MIN: Performed by: ORTHOPAEDIC SURGERY

## 2022-05-04 PROCEDURE — 27130 TOTAL HIP ARTHROPLASTY: CPT | Mod: LT | Performed by: ORTHOPAEDIC SURGERY

## 2022-05-04 PROCEDURE — 999N000065 XR PELVIS AND HIP PORTABLE LEFT 1 VIEW

## 2022-05-04 PROCEDURE — 258N000003 HC RX IP 258 OP 636: Performed by: NURSE ANESTHETIST, CERTIFIED REGISTERED

## 2022-05-04 PROCEDURE — 360N000077 HC SURGERY LEVEL 4, PER MIN: Performed by: ORTHOPAEDIC SURGERY

## 2022-05-04 PROCEDURE — 73502 X-RAY EXAM HIP UNI 2-3 VIEWS: CPT | Mod: 26 | Performed by: RADIOLOGY

## 2022-05-04 PROCEDURE — 710N000010 HC RECOVERY PHASE 1, LEVEL 2, PER MIN: Performed by: ORTHOPAEDIC SURGERY

## 2022-05-04 PROCEDURE — 370N000017 HC ANESTHESIA TECHNICAL FEE, PER MIN: Performed by: ORTHOPAEDIC SURGERY

## 2022-05-04 PROCEDURE — P9041 ALBUMIN (HUMAN),5%, 50ML: HCPCS | Performed by: NURSE ANESTHETIST, CERTIFIED REGISTERED

## 2022-05-04 PROCEDURE — 99202 OFFICE O/P NEW SF 15 MIN: CPT | Performed by: INTERNAL MEDICINE

## 2022-05-04 PROCEDURE — 250N000013 HC RX MED GY IP 250 OP 250 PS 637: Performed by: ANESTHESIOLOGY

## 2022-05-04 PROCEDURE — 272N000001 HC OR GENERAL SUPPLY STERILE: Performed by: ORTHOPAEDIC SURGERY

## 2022-05-04 PROCEDURE — 258N000001 HC RX 258: Performed by: ORTHOPAEDIC SURGERY

## 2022-05-04 PROCEDURE — 97530 THERAPEUTIC ACTIVITIES: CPT | Mod: GP

## 2022-05-04 PROCEDURE — C1776 JOINT DEVICE (IMPLANTABLE): HCPCS | Performed by: ORTHOPAEDIC SURGERY

## 2022-05-04 PROCEDURE — 999N000141 HC STATISTIC PRE-PROCEDURE NURSING ASSESSMENT: Performed by: ORTHOPAEDIC SURGERY

## 2022-05-04 PROCEDURE — 97161 PT EVAL LOW COMPLEX 20 MIN: CPT | Mod: GP

## 2022-05-04 PROCEDURE — 250N000009 HC RX 250: Performed by: ORTHOPAEDIC SURGERY

## 2022-05-04 PROCEDURE — 250N000011 HC RX IP 250 OP 636

## 2022-05-04 PROCEDURE — 250N000009 HC RX 250: Performed by: NURSE ANESTHETIST, CERTIFIED REGISTERED

## 2022-05-04 PROCEDURE — C1713 ANCHOR/SCREW BN/BN,TIS/BN: HCPCS | Performed by: ORTHOPAEDIC SURGERY

## 2022-05-04 DEVICE — SYNERGY POROUS FEMORAL COMPONENT SIZE 11
Type: IMPLANTABLE DEVICE | Site: HIP | Status: FUNCTIONAL
Brand: SYNERGY

## 2022-05-04 DEVICE — IMP SCR ACET SNN SPHERICAL HEAD 6.5X15MM 71332515: Type: IMPLANTABLE DEVICE | Site: HIP | Status: FUNCTIONAL

## 2022-05-04 DEVICE — REFLECTION SPHERICAL HEAD SCREW 40MM
Type: IMPLANTABLE DEVICE | Site: HIP | Status: FUNCTIONAL
Brand: REFLECTION

## 2022-05-04 DEVICE — REFLECTION SPHERICAL HEAD SCREW 30MM
Type: IMPLANTABLE DEVICE | Site: HIP | Status: FUNCTIONAL
Brand: REFLECTION

## 2022-05-04 DEVICE — R3 MULTI HOLE ACETABULAR SHELL 48MM
Type: IMPLANTABLE DEVICE | Site: HIP | Status: FUNCTIONAL
Brand: R3, STIKTITE

## 2022-05-04 DEVICE — COBALT CHROME 12/14 TAPER FEMORAL                                    HEAD 32MM + 0: Type: IMPLANTABLE DEVICE | Site: HIP | Status: FUNCTIONAL

## 2022-05-04 DEVICE — R3 0 DEGREE XLPE ACETABULAR LINER                                    32MM ID X OD 48MM
Type: IMPLANTABLE DEVICE | Site: HIP | Status: FUNCTIONAL
Brand: R3

## 2022-05-04 RX ORDER — PROCHLORPERAZINE MALEATE 10 MG
10 TABLET ORAL EVERY 6 HOURS PRN
Status: DISCONTINUED | OUTPATIENT
Start: 2022-05-04 | End: 2022-05-05 | Stop reason: HOSPADM

## 2022-05-04 RX ORDER — OXYCODONE HYDROCHLORIDE 10 MG/1
10 TABLET ORAL EVERY 4 HOURS PRN
Status: DISCONTINUED | OUTPATIENT
Start: 2022-05-04 | End: 2022-05-05 | Stop reason: HOSPADM

## 2022-05-04 RX ORDER — KETOROLAC TROMETHAMINE 30 MG/ML
INJECTION, SOLUTION INTRAMUSCULAR; INTRAVENOUS PRN
Status: DISCONTINUED | OUTPATIENT
Start: 2022-05-04 | End: 2022-05-04

## 2022-05-04 RX ORDER — OXYCODONE HYDROCHLORIDE 5 MG/1
5 TABLET ORAL EVERY 4 HOURS PRN
Status: DISCONTINUED | OUTPATIENT
Start: 2022-05-04 | End: 2022-05-04 | Stop reason: HOSPADM

## 2022-05-04 RX ORDER — HYDROMORPHONE HYDROCHLORIDE 1 MG/ML
0.4 INJECTION, SOLUTION INTRAMUSCULAR; INTRAVENOUS; SUBCUTANEOUS
Status: DISCONTINUED | OUTPATIENT
Start: 2022-05-04 | End: 2022-05-05 | Stop reason: HOSPADM

## 2022-05-04 RX ORDER — CEFAZOLIN SODIUM 2 G/100ML
2 INJECTION, SOLUTION INTRAVENOUS EVERY 8 HOURS
Status: COMPLETED | OUTPATIENT
Start: 2022-05-04 | End: 2022-05-05

## 2022-05-04 RX ORDER — TRANEXAMIC ACID 10 MG/ML
1 INJECTION, SOLUTION INTRAVENOUS ONCE
Status: COMPLETED | OUTPATIENT
Start: 2022-05-04 | End: 2022-05-04

## 2022-05-04 RX ORDER — AMLODIPINE BESYLATE 10 MG/1
10 TABLET ORAL DAILY
Status: DISCONTINUED | OUTPATIENT
Start: 2022-05-05 | End: 2022-05-05 | Stop reason: HOSPADM

## 2022-05-04 RX ORDER — IBUPROFEN 200 MG
200 TABLET ORAL EVERY 4 HOURS PRN
Status: DISCONTINUED | OUTPATIENT
Start: 2022-05-04 | End: 2022-05-05 | Stop reason: HOSPADM

## 2022-05-04 RX ORDER — ONDANSETRON 2 MG/ML
4 INJECTION INTRAMUSCULAR; INTRAVENOUS EVERY 30 MIN PRN
Status: DISCONTINUED | OUTPATIENT
Start: 2022-05-04 | End: 2022-05-04 | Stop reason: HOSPADM

## 2022-05-04 RX ORDER — ONDANSETRON 4 MG/1
4 TABLET, ORALLY DISINTEGRATING ORAL EVERY 30 MIN PRN
Status: DISCONTINUED | OUTPATIENT
Start: 2022-05-04 | End: 2022-05-04 | Stop reason: HOSPADM

## 2022-05-04 RX ORDER — ALBUMIN, HUMAN INJ 5% 5 %
SOLUTION INTRAVENOUS CONTINUOUS PRN
Status: DISCONTINUED | OUTPATIENT
Start: 2022-05-04 | End: 2022-05-04

## 2022-05-04 RX ORDER — HYDROMORPHONE HCL IN WATER/PF 6 MG/30 ML
0.2 PATIENT CONTROLLED ANALGESIA SYRINGE INTRAVENOUS EVERY 5 MIN PRN
Status: DISCONTINUED | OUTPATIENT
Start: 2022-05-04 | End: 2022-05-04 | Stop reason: HOSPADM

## 2022-05-04 RX ORDER — FENTANYL CITRATE 50 UG/ML
INJECTION, SOLUTION INTRAMUSCULAR; INTRAVENOUS PRN
Status: DISCONTINUED | OUTPATIENT
Start: 2022-05-04 | End: 2022-05-04

## 2022-05-04 RX ORDER — SODIUM CHLORIDE, SODIUM LACTATE, POTASSIUM CHLORIDE, CALCIUM CHLORIDE 600; 310; 30; 20 MG/100ML; MG/100ML; MG/100ML; MG/100ML
INJECTION, SOLUTION INTRAVENOUS CONTINUOUS
Status: DISCONTINUED | OUTPATIENT
Start: 2022-05-04 | End: 2022-05-05

## 2022-05-04 RX ORDER — AMOXICILLIN 250 MG
1 CAPSULE ORAL 2 TIMES DAILY
Status: DISCONTINUED | OUTPATIENT
Start: 2022-05-04 | End: 2022-05-05 | Stop reason: HOSPADM

## 2022-05-04 RX ORDER — POLYETHYLENE GLYCOL 3350 17 G/17G
17 POWDER, FOR SOLUTION ORAL DAILY
Status: DISCONTINUED | OUTPATIENT
Start: 2022-05-05 | End: 2022-05-05 | Stop reason: HOSPADM

## 2022-05-04 RX ORDER — ACETAMINOPHEN 325 MG/1
650 TABLET ORAL EVERY 4 HOURS PRN
Status: DISCONTINUED | OUTPATIENT
Start: 2022-05-07 | End: 2022-05-05 | Stop reason: HOSPADM

## 2022-05-04 RX ORDER — BISACODYL 10 MG
10 SUPPOSITORY, RECTAL RECTAL DAILY PRN
Status: DISCONTINUED | OUTPATIENT
Start: 2022-05-04 | End: 2022-05-05 | Stop reason: HOSPADM

## 2022-05-04 RX ORDER — NAPROXEN 500 MG/1
500 TABLET ORAL 2 TIMES DAILY WITH MEALS
Status: DISCONTINUED | OUTPATIENT
Start: 2022-05-04 | End: 2022-05-05 | Stop reason: HOSPADM

## 2022-05-04 RX ORDER — PROPOFOL 10 MG/ML
INJECTION, EMULSION INTRAVENOUS PRN
Status: DISCONTINUED | OUTPATIENT
Start: 2022-05-04 | End: 2022-05-04

## 2022-05-04 RX ORDER — ONDANSETRON 2 MG/ML
INJECTION INTRAMUSCULAR; INTRAVENOUS PRN
Status: DISCONTINUED | OUTPATIENT
Start: 2022-05-04 | End: 2022-05-04

## 2022-05-04 RX ORDER — OXYCODONE HYDROCHLORIDE 5 MG/1
5 TABLET ORAL EVERY 4 HOURS PRN
Status: DISCONTINUED | OUTPATIENT
Start: 2022-05-04 | End: 2022-05-05 | Stop reason: HOSPADM

## 2022-05-04 RX ORDER — ONDANSETRON 2 MG/ML
4 INJECTION INTRAMUSCULAR; INTRAVENOUS EVERY 6 HOURS PRN
Status: DISCONTINUED | OUTPATIENT
Start: 2022-05-04 | End: 2022-05-05 | Stop reason: HOSPADM

## 2022-05-04 RX ORDER — KETOROLAC TROMETHAMINE 30 MG/ML
15 INJECTION, SOLUTION INTRAMUSCULAR; INTRAVENOUS EVERY 6 HOURS
Status: DISCONTINUED | OUTPATIENT
Start: 2022-05-04 | End: 2022-05-05 | Stop reason: HOSPADM

## 2022-05-04 RX ORDER — GLYCOPYRROLATE 0.2 MG/ML
INJECTION, SOLUTION INTRAMUSCULAR; INTRAVENOUS PRN
Status: DISCONTINUED | OUTPATIENT
Start: 2022-05-04 | End: 2022-05-04

## 2022-05-04 RX ORDER — LOSARTAN POTASSIUM 100 MG/1
100 TABLET ORAL DAILY
Status: DISCONTINUED | OUTPATIENT
Start: 2022-05-05 | End: 2022-05-05 | Stop reason: HOSPADM

## 2022-05-04 RX ORDER — LIDOCAINE HYDROCHLORIDE 20 MG/ML
INJECTION, SOLUTION INFILTRATION; PERINEURAL PRN
Status: DISCONTINUED | OUTPATIENT
Start: 2022-05-04 | End: 2022-05-04

## 2022-05-04 RX ORDER — DEXAMETHASONE SODIUM PHOSPHATE 4 MG/ML
INJECTION, SOLUTION INTRA-ARTICULAR; INTRALESIONAL; INTRAMUSCULAR; INTRAVENOUS; SOFT TISSUE PRN
Status: DISCONTINUED | OUTPATIENT
Start: 2022-05-04 | End: 2022-05-04

## 2022-05-04 RX ORDER — HYDROMORPHONE HYDROCHLORIDE 1 MG/ML
0.2 INJECTION, SOLUTION INTRAMUSCULAR; INTRAVENOUS; SUBCUTANEOUS
Status: DISCONTINUED | OUTPATIENT
Start: 2022-05-04 | End: 2022-05-05 | Stop reason: HOSPADM

## 2022-05-04 RX ORDER — NALOXONE HYDROCHLORIDE 0.4 MG/ML
0.2 INJECTION, SOLUTION INTRAMUSCULAR; INTRAVENOUS; SUBCUTANEOUS
Status: DISCONTINUED | OUTPATIENT
Start: 2022-05-04 | End: 2022-05-05 | Stop reason: HOSPADM

## 2022-05-04 RX ORDER — ASPIRIN 81 MG/1
162 TABLET ORAL DAILY
Status: DISCONTINUED | OUTPATIENT
Start: 2022-05-05 | End: 2022-05-05 | Stop reason: HOSPADM

## 2022-05-04 RX ORDER — FENTANYL CITRATE 50 UG/ML
25 INJECTION, SOLUTION INTRAMUSCULAR; INTRAVENOUS EVERY 5 MIN PRN
Status: DISCONTINUED | OUTPATIENT
Start: 2022-05-04 | End: 2022-05-04 | Stop reason: HOSPADM

## 2022-05-04 RX ORDER — NALOXONE HYDROCHLORIDE 0.4 MG/ML
0.4 INJECTION, SOLUTION INTRAMUSCULAR; INTRAVENOUS; SUBCUTANEOUS
Status: DISCONTINUED | OUTPATIENT
Start: 2022-05-04 | End: 2022-05-05 | Stop reason: HOSPADM

## 2022-05-04 RX ORDER — SODIUM CHLORIDE, SODIUM LACTATE, POTASSIUM CHLORIDE, CALCIUM CHLORIDE 600; 310; 30; 20 MG/100ML; MG/100ML; MG/100ML; MG/100ML
INJECTION, SOLUTION INTRAVENOUS CONTINUOUS PRN
Status: DISCONTINUED | OUTPATIENT
Start: 2022-05-04 | End: 2022-05-04

## 2022-05-04 RX ORDER — LIDOCAINE 40 MG/G
CREAM TOPICAL
Status: DISCONTINUED | OUTPATIENT
Start: 2022-05-04 | End: 2022-05-05 | Stop reason: HOSPADM

## 2022-05-04 RX ORDER — CEFAZOLIN SODIUM 2 G/100ML
2 INJECTION, SOLUTION INTRAVENOUS EVERY 8 HOURS
Status: DISCONTINUED | OUTPATIENT
Start: 2022-05-04 | End: 2022-05-04 | Stop reason: HOSPADM

## 2022-05-04 RX ORDER — ONDANSETRON 4 MG/1
4 TABLET, ORALLY DISINTEGRATING ORAL EVERY 6 HOURS PRN
Status: DISCONTINUED | OUTPATIENT
Start: 2022-05-04 | End: 2022-05-05 | Stop reason: HOSPADM

## 2022-05-04 RX ORDER — EPHEDRINE SULFATE 50 MG/ML
INJECTION, SOLUTION INTRAMUSCULAR; INTRAVENOUS; SUBCUTANEOUS PRN
Status: DISCONTINUED | OUTPATIENT
Start: 2022-05-04 | End: 2022-05-04

## 2022-05-04 RX ORDER — SODIUM CHLORIDE, SODIUM LACTATE, POTASSIUM CHLORIDE, CALCIUM CHLORIDE 600; 310; 30; 20 MG/100ML; MG/100ML; MG/100ML; MG/100ML
INJECTION, SOLUTION INTRAVENOUS CONTINUOUS
Status: DISCONTINUED | OUTPATIENT
Start: 2022-05-04 | End: 2022-05-04 | Stop reason: HOSPADM

## 2022-05-04 RX ORDER — ACETAMINOPHEN 325 MG/1
975 TABLET ORAL EVERY 8 HOURS
Status: DISCONTINUED | OUTPATIENT
Start: 2022-05-04 | End: 2022-05-05 | Stop reason: HOSPADM

## 2022-05-04 RX ADMIN — KETOROLAC TROMETHAMINE 15 MG: 30 INJECTION, SOLUTION INTRAMUSCULAR at 12:29

## 2022-05-04 RX ADMIN — Medication 10 MG: at 12:35

## 2022-05-04 RX ADMIN — KETOROLAC TROMETHAMINE 15 MG: 30 INJECTION, SOLUTION INTRAMUSCULAR at 17:44

## 2022-05-04 RX ADMIN — LIDOCAINE HYDROCHLORIDE 80 MG: 20 INJECTION, SOLUTION INFILTRATION; PERINEURAL at 10:00

## 2022-05-04 RX ADMIN — DEXAMETHASONE SODIUM PHOSPHATE 8 MG: 4 INJECTION, SOLUTION INTRAMUSCULAR; INTRAVENOUS at 10:13

## 2022-05-04 RX ADMIN — Medication 5 MG: at 12:42

## 2022-05-04 RX ADMIN — PHENYLEPHRINE HYDROCHLORIDE 150 MCG: 10 INJECTION INTRAVENOUS at 12:29

## 2022-05-04 RX ADMIN — PHENYLEPHRINE HYDROCHLORIDE 100 MCG: 10 INJECTION INTRAVENOUS at 12:17

## 2022-05-04 RX ADMIN — HYDROMORPHONE HYDROCHLORIDE 0.5 MG: 1 INJECTION, SOLUTION INTRAMUSCULAR; INTRAVENOUS; SUBCUTANEOUS at 10:41

## 2022-05-04 RX ADMIN — HYDROMORPHONE HYDROCHLORIDE 0.5 MG: 1 INJECTION, SOLUTION INTRAMUSCULAR; INTRAVENOUS; SUBCUTANEOUS at 11:52

## 2022-05-04 RX ADMIN — GLYCOPYRROLATE 0.1 MG: 0.2 INJECTION, SOLUTION INTRAMUSCULAR; INTRAVENOUS at 12:24

## 2022-05-04 RX ADMIN — OXYCODONE 5 MG: 5 TABLET ORAL at 14:35

## 2022-05-04 RX ADMIN — PHENYLEPHRINE HYDROCHLORIDE 100 MCG: 10 INJECTION INTRAVENOUS at 10:38

## 2022-05-04 RX ADMIN — KETOROLAC TROMETHAMINE 15 MG: 30 INJECTION, SOLUTION INTRAMUSCULAR at 23:16

## 2022-05-04 RX ADMIN — Medication 5 MG: at 12:29

## 2022-05-04 RX ADMIN — CEFAZOLIN SODIUM 2 G: 2 INJECTION, SOLUTION INTRAVENOUS at 09:54

## 2022-05-04 RX ADMIN — CEFAZOLIN SODIUM 2 G: 2 INJECTION, SOLUTION INTRAVENOUS at 17:37

## 2022-05-04 RX ADMIN — FENTANYL CITRATE 50 MCG: 50 INJECTION, SOLUTION INTRAMUSCULAR; INTRAVENOUS at 10:00

## 2022-05-04 RX ADMIN — ONDANSETRON 4 MG: 2 INJECTION INTRAMUSCULAR; INTRAVENOUS at 10:42

## 2022-05-04 RX ADMIN — FENTANYL CITRATE 25 MCG: 50 INJECTION, SOLUTION INTRAMUSCULAR; INTRAVENOUS at 13:57

## 2022-05-04 RX ADMIN — FENTANYL CITRATE 25 MCG: 50 INJECTION, SOLUTION INTRAMUSCULAR; INTRAVENOUS at 11:18

## 2022-05-04 RX ADMIN — ROCURONIUM BROMIDE 10 MG: 50 INJECTION, SOLUTION INTRAVENOUS at 10:24

## 2022-05-04 RX ADMIN — MIDAZOLAM 2 MG: 1 INJECTION INTRAMUSCULAR; INTRAVENOUS at 09:53

## 2022-05-04 RX ADMIN — PHENYLEPHRINE HYDROCHLORIDE 200 MCG: 10 INJECTION INTRAVENOUS at 12:35

## 2022-05-04 RX ADMIN — PHENYLEPHRINE HYDROCHLORIDE 100 MCG: 10 INJECTION INTRAVENOUS at 12:42

## 2022-05-04 RX ADMIN — PHENYLEPHRINE HYDROCHLORIDE 100 MCG: 10 INJECTION INTRAVENOUS at 12:20

## 2022-05-04 RX ADMIN — FENTANYL CITRATE 25 MCG: 50 INJECTION, SOLUTION INTRAMUSCULAR; INTRAVENOUS at 11:10

## 2022-05-04 RX ADMIN — TRANEXAMIC ACID 1 G: 10 INJECTION, SOLUTION INTRAVENOUS at 10:46

## 2022-05-04 RX ADMIN — PHENYLEPHRINE HYDROCHLORIDE 150 MCG: 10 INJECTION INTRAVENOUS at 12:56

## 2022-05-04 RX ADMIN — PHENYLEPHRINE HYDROCHLORIDE 100 MCG: 10 INJECTION INTRAVENOUS at 12:23

## 2022-05-04 RX ADMIN — ROCURONIUM BROMIDE 40 MG: 50 INJECTION, SOLUTION INTRAVENOUS at 10:00

## 2022-05-04 RX ADMIN — ROCURONIUM BROMIDE 20 MG: 50 INJECTION, SOLUTION INTRAVENOUS at 10:54

## 2022-05-04 RX ADMIN — PHENYLEPHRINE HYDROCHLORIDE 100 MCG: 10 INJECTION INTRAVENOUS at 10:20

## 2022-05-04 RX ADMIN — ALBUMIN HUMAN: 0.05 INJECTION, SOLUTION INTRAVENOUS at 12:42

## 2022-05-04 RX ADMIN — ROCURONIUM BROMIDE 10 MG: 50 INJECTION, SOLUTION INTRAVENOUS at 11:52

## 2022-05-04 RX ADMIN — PHENYLEPHRINE HYDROCHLORIDE 100 MCG: 10 INJECTION INTRAVENOUS at 10:45

## 2022-05-04 RX ADMIN — SODIUM CHLORIDE, POTASSIUM CHLORIDE, SODIUM LACTATE AND CALCIUM CHLORIDE: 600; 310; 30; 20 INJECTION, SOLUTION INTRAVENOUS at 12:58

## 2022-05-04 RX ADMIN — ACETAMINOPHEN 975 MG: 325 TABLET ORAL at 21:56

## 2022-05-04 RX ADMIN — PROPOFOL 200 MG: 10 INJECTION, EMULSION INTRAVENOUS at 10:00

## 2022-05-04 RX ADMIN — PHENYLEPHRINE HYDROCHLORIDE 100 MCG: 10 INJECTION INTRAVENOUS at 11:35

## 2022-05-04 RX ADMIN — PHENYLEPHRINE HYDROCHLORIDE 100 MCG: 10 INJECTION INTRAVENOUS at 11:59

## 2022-05-04 RX ADMIN — Medication 5 MG: at 12:23

## 2022-05-04 RX ADMIN — SODIUM CHLORIDE, POTASSIUM CHLORIDE, SODIUM LACTATE AND CALCIUM CHLORIDE: 600; 310; 30; 20 INJECTION, SOLUTION INTRAVENOUS at 11:36

## 2022-05-04 RX ADMIN — SODIUM CHLORIDE, POTASSIUM CHLORIDE, SODIUM LACTATE AND CALCIUM CHLORIDE: 600; 310; 30; 20 INJECTION, SOLUTION INTRAVENOUS at 09:53

## 2022-05-04 RX ADMIN — SUGAMMADEX 200 MG: 100 INJECTION, SOLUTION INTRAVENOUS at 13:14

## 2022-05-04 RX ADMIN — ACETAMINOPHEN 975 MG: 325 TABLET ORAL at 14:34

## 2022-05-04 ASSESSMENT — ACTIVITIES OF DAILY LIVING (ADL)
ADLS_ACUITY_SCORE: 6

## 2022-05-04 NOTE — BRIEF OP NOTE
[facility]    Brief Operative Note    Pre-operative diagnosis: Primary osteoarthritis of left hip [M16.12]  Post-operative diagnosis Same as pre-operative diagnosis    Procedure: Procedure(s):  ARTHROPLASTY, LEFT HIP, TOTAL  Surgeon: Surgeon(s) and Role:     * Joe Genao MD - Primary     * Marko Harrison MD - Resident - Assisting  Anesthesia: General   Estimated Blood Loss: 200 ml    Drains: None  Specimens: * No specimens in log *  Findings:   See operative report.  Complications: None.  Implants:   Implant Name Type Inv. Item Serial No.  Lot No. LRB No. Used Action   48 MM OD SHELL Total Joint Component/Insert   [employer] [id number] Left 1 Implanted   IMP SCR ACET SNN SPHERICAL HEAD 6.5X40MM [id number] - [id number] Metallic Hardware/[place] IMP SCR ACET SNN SPHERICAL HEAD 6.5X40MM [id number]  [employer]-R [id number] Left 1 Implanted   IMP LINER S&N ACET R3 XLPE [id number] 0DEG [id number] - [id number] Total Joint Component/Insert IMP LINER S&N ACET R3 XLPE [id number] 0DEG [id number]  [employer] [id number] Left 1 Implanted   IMP SCR ACET SNN SPHERICAL HEAD 6.5X30MM [id number] - [id number] Metallic Hardware/[place] IMP SCR ACET SNN SPHERICAL HEAD 6.5X30MM [id number]  [employer]R [id number] Left 1 Implanted   IMP SCR ACET SNN SPHERICAL HEAD 6.5X30MM [id number] - [id number] Metallic Hardware/[place] IMP SCR ACET SNN SPHERICAL HEAD 6.5X30MM [id number]  [employer]R [id number] Left 1 Implanted   IMP STEM FEM HIP SNN SYNERGY POROUS SZ 11 [id number] - [id number] Total Joint Component/Insert IMP STEM FEM HIP SNN SYNERGY POROUS SZ 11 [id number]  [employer]R [id number] Left 1 Implanted   IMP HEAD FEMORAL SNR [place] 32MM +0 [id number] - [id number] Total Joint Component/Insert IMP HEAD FEMORAL SNR [place] 32MM +0 [id number]  [employer] [id number] Left 1 Implanted           POST OPERATIVE PLAN    Assessment/Plan: Milvia Mckinney a  51F s/p Left GOVIND on 5/4/2022 with Dr. Genao    Activity: Posterior hip precautions x3 months  Weight bearing status: WBAT  Antibiotics: Cefazolin x 24 hours  Diet: Regular. Bowel regimen. Anti-emetics PRN.    DVT prophylaxis: Mechanical + ASA x4 weeks  Wound Care: Alginate tegaderm x4 weeks  Drains: none  Pain management: Orals PRN, IV for breakthrough only  X-rays: PACU  Physical Therapy: Mobilization, ROM, ADL's  Occupational Therapy: ADL's  Labs: Trend Hgb on POD #1, 2   Consults: PT, OT, Medicine. Appreciate assistance in caring for this patient throughout the perioperative period  Disposition: Pending progress with therapies, pain control on orals, and medical stability, anticipate discharge to home on POD 1-2    Future Appointments   Date Time Provider Department Center   5/4/2022  1:00 PM Conchita Elena [place]   5/4/2022  3:00 PM Kris Clark, PT [facility] [place]   5/5/2022  8:45 AM Shahida Velasquez PT PT [place]   5/5/2022 11:15 AM Deborah Siddiqui OT [facility] [place]   5/5/2022  1:30 PM Shahida Velasquez, AVA [facility] [place]   5/17/2022  2:30 PM Birdie Gil PA-C [facility]   5/27/2022  1:00 PM Henny Nguyễn MD [facility]   6/16/2022  2:00 PM Joe Genao MD [facility]         Marko Harrison MD  Orthopaedic Surgery, PGY4

## 2022-05-04 NOTE — ANESTHESIA PREPROCEDURE EVALUATION
Anesthesia Pre-Procedure Evaluation    Patient: Milvia Carrasquillo   MRN: [id number] : 1970        Procedure : Procedure(s):  ARTHROPLASTY, LEFT HIP, TOTAL          History reviewed. No pertinent past medical history.   History reviewed. No pertinent surgical history.   No Known Allergies   Social History     Tobacco Use     Smoking status: Never Smoker     Smokeless tobacco: Never Used   Substance Use Topics     Alcohol use: Not on file      Wt Readings from Last 1 Encounters:   22 91.7 kg (202 lb 2.6 oz)        Anesthesia Evaluation            ROS/MED HX  ENT/Pulmonary:       Neurologic:       Cardiovascular:     (+) hypertension-----    METS/Exercise Tolerance:     Hematologic:       Musculoskeletal:       GI/Hepatic:       Renal/Genitourinary:       Endo:     (+) Obesity,     Psychiatric/Substance Use:       Infectious Disease:       Malignancy:       Other:            Physical Exam    Airway        Mallampati: II   TM distance: > 3 FB   Neck ROM: full   Mouth opening: > 3 cm    Respiratory Devices and Support         Dental  no notable dental history         Cardiovascular   cardiovascular exam normal          Pulmonary   pulmonary exam normal                OUTSIDE LABS:  CBC:   Lab Results   Component Value Date    WBC 10.0 2022    HGB 13.1 2022    HCT 41.2 2022     2022     BMP:   Lab Results   Component Value Date     2022     2022    POTASSIUM 4.1 2022    POTASSIUM 4.5 2022    CHLORIDE 102 2022    CHLORIDE 103 2022    CO2 28 2022    CO2 25 2022    BUN 16 2022    BUN 15 2022    CR 0.73 2022    CR 0.77 2022     (H) 2022     (H) 2022     COAGS: No results found for: PTT, INR, FIBR  POC:   Lab Results   Component Value Date    HCG Negative 2022     HEPATIC: No results found for: ALBUMIN, PROTTOTAL, ALT, AST, GGT, ALKPHOS, BILITOTAL, BILIDIRECT,  ARVIND  OTHER:   Lab Results   Component Value Date    SALVATORE 9.9 03/23/2022    TSH 2.32 03/07/2022       Anesthesia Plan    ASA Status:  2   NPO Status:  NPO Appropriate    Anesthesia Type: General.     - Airway: ETT   Induction: Intravenous.   Maintenance: Balanced.        Consents    Anesthesia Plan(s) and associated risks, benefits, and realistic alternatives discussed. Questions answered and patient/representative(s) expressed understanding.    - Discussed:     - Discussed with:  Patient      - Extended Intubation/Ventilatory Support Discussed: No.      - Patient is DNR/DNI Status: No    Use of blood products discussed: No .     Postoperative Care       PONV prophylaxis: Ondansetron (or other 5HT-3), Dexamethasone or Solumedrol     Comments:                Margarita Ward MD

## 2022-05-04 NOTE — ANESTHESIA CARE TRANSFER NOTE
Patient: Milvia Carrasquillo    Procedure: Procedure(s):  ARTHROPLASTY, LEFT HIP, TOTAL       Diagnosis: Primary osteoarthritis of left hip [M16.12]  Diagnosis Additional Information: No value filed.    Anesthesia Type:   No value filed.     Note:    Oropharynx: oropharynx clear of all foreign objects  Level of Consciousness: awake  Oxygen Supplementation: face mask  Level of Supplemental Oxygen (L/min / FiO2): 6  Independent Airway: airway patency satisfactory and stable  Dentition: dentition unchanged  Vital Signs Stable: post-procedure vital signs reviewed and stable  Report to RN Given: handoff report given  Patient transferred to: PACU    Handoff Report: Identifed the Patient, Identified the Reponsible Provider, Reviewed the pertinent medical history, Discussed the surgical course, Reviewed Intra-OP anesthesia mangement and issues during anesthesia, Set expectations for post-procedure period and Allowed opportunity for questions and acknowledgement of understanding      Vitals:  Vitals Value Taken Time   /66 05/04/22 1330   Temp 36.8c    Pulse 96 05/04/22 1334   Resp 22 05/04/22 1334   SpO2 100 % 05/04/22 1334   Vitals shown include unvalidated device data.    Electronically Signed By: DARCY Villanueva CRNA  May 4, 2022  1:34 PM

## 2022-05-04 NOTE — ANESTHESIA POSTPROCEDURE EVALUATION
Patient: Milvia Carrasquillo    Procedure: Procedure(s):  ARTHROPLASTY, LEFT HIP, TOTAL       Anesthesia Type:  General    Note:  Disposition: Admission   Postop Pain Control: Uneventful            Sign Out: Well controlled pain   PONV: No   Neuro/Psych: Uneventful            Sign Out: Acceptable/Baseline neuro status   Airway/Respiratory: Uneventful            Sign Out: Acceptable/Baseline resp. status   CV/Hemodynamics: Uneventful            Sign Out: Acceptable CV status; No obvious hypovolemia; No obvious fluid overload   Other NRE: NONE   DID A NON-ROUTINE EVENT OCCUR? No           Last vitals:  Vitals Value Taken Time   /65 05/04/22 1515   Temp 36.8  C (98.3  F) 05/04/22 1430   Pulse 103 05/04/22 1515   Resp 18 05/04/22 1450   SpO2 100 % 05/04/22 1520   Vitals shown include unvalidated device data.    Electronically Signed By: Stephany Crabtree MD  May 4, 2022  3:22 PM

## 2022-05-04 NOTE — ANESTHESIA PROCEDURE NOTES
Airway       Patient location during procedure: OR       Procedure Start/Stop Times: 5/4/2022 10:08 AM  Staff -        CRNA: Antonino Gibson APRN CRNA       Performed By: CRNA  Consent for Airway        Urgency: elective  Indications and Patient Condition       Indications for airway management: beatrice-procedural       Induction type:intravenous       Mask difficulty assessment: 2 - vent by mask + OA or adjuvant +/- NMBA    Final Airway Details       Final airway type: endotracheal airway       Successful airway: ETT - single and Oral  Endotracheal Airway Details        ETT size (mm): 6.0       Cuffed: yes       Cuff volume (mL): 10       Successful intubation technique: direct laryngoscopy and video laryngoscopy       DL Blade Type: Lima 2       VL Blade Size: MAC D Blade       Grade View of Cords: 1       Adjucts: stylet       Position: Right       Measured from: lips       Secured at (cm): 23       Bite block used: None    Post intubation assessment        Placement verified by: capnometry, equal breath sounds and chest rise        Number of attempts at approach: 1       Number of other approaches attempted: 1       Secured with: silk tape       Ease of procedure: easy       Dentition: Intact and Unchanged    Medication(s) Administered   Medication Administration Time: 5/4/2022 10:08 AM    Additional Comments       Unable to visualize glottic opening with DL. Able to obtain a grade 1 view with D-Blade

## 2022-05-04 NOTE — PROGRESS NOTES
PACU to Inpatient Nursing Handoff    Patient Milvia Carrasquillo is a 51 year old female who speaks Syriac.   Procedure Procedure(s):  ARTHROPLASTY, LEFT HIP, TOTAL   Surgeon(s) Primary: Joe Genao MD  Resident - Assisting: Marko Harrison MD     No Known Allergies    Isolation  No active isolations     Past Medical History   has no past medical history on file.    Anesthesia General   Dermatome Level     Preop Meds Not applicable   Nerve block Not applicable   Intraop Meds dexamethasone (Decadron)  fentanyl (Sublimaze): 100 mcg total  hydromorphone (Dilaudid): 1 mg total  ketorolac (Toradol): last given at 1230  ondansetron (Zofran): last given at 1042  TXA, versed   Local Meds Yes   Antibiotics cefazolin (Ancef) - last given at 0954     Pain Patient Currently in Pain: yes   PACU meds  fentanyl (Sublimaze): 25 mcg (total dose) last given at 1357    PCA / epidural No   Capnography     Telemetry ECG Rhythm: Sinus rhythm   Inpatient Telemetry Monitor Ordered? No        Labs Glucose Lab Results   Component Value Date     05/04/2022     03/23/2022       Hgb Lab Results   Component Value Date    HGB 13.1 05/04/2022       INR No results found for: INR   PACU Imaging Completed     Wound/Incision Incision/Surgical Site 05/04/22 Left Hip (Active)   Incision Assessment UTV 05/04/22 1415   Closure JANELL 05/04/22 1415   Incision Drainage Amount None 05/04/22 1415   Dressing Intervention Clean, dry, intact 05/04/22 1415   Number of days: 0      CMS        Equipment Not applicable   Other LDA       IV Access Peripheral IV 05/04/22 Right Hand (Active)   Site Assessment WDL 05/04/22 1330   Line Status Infusing 05/04/22 1330   Dressing Intervention New dressing  05/04/22 0847   Phlebitis Scale 0-->no symptoms 05/04/22 1330   Infiltration Scale 0 05/04/22 0847   Number of days: 0      Blood Products Albumin  mL   Intake/Output Date 05/04/22 0700 - 05/05/22 0659   Shift 4307-1029 2156-9494 4526-6381 24  Hour Total   INTAKE   I.V. 2100   2100   Colloid 250   250   Shift Total(mL/kg) 2350(25.63)   2350(25.63)   OUTPUT   Blood 300   300   Shift Total(mL/kg) 300(3.27)   300(3.27)   Weight (kg) 91.7 91.7 91.7 91.7      Drains / Sharpe     Time of void PreOp Void Prior to Procedure: 0530 (05/04/22 0830)    PostOp      Diapered? No   Bladder Scan     PO    water, ice chips, sprite     Vitals    B/P: 128/65  T: 98.2  F (36.8  C)    Temp src: Oral  P:  Pulse: 102 (05/04/22 1400)          R: 16  O2:  SpO2: 94 %         Oxygen Delivery: 2 LPM (05/04/22 1400)         Family/support present daughter, Kamla, is here   Patient belongings     Patient transported on bed   DC meds/scripts (obs/outpt) Not applicable   Inpatient Pain Meds Released? Yes       Special needs/considerations Used    Tasks needing completion None       MARIA ISABEL KEMP RN  [facility]COM [id number]

## 2022-05-04 NOTE — OP NOTE
OPERATIVE REPORT    DATE OF SERVICE: 5/4/22    SURGEON: Joe Genao MD.    ASSISTANT(S):  Jayne WISE and Marko Harrison MD    PREOPERATIVE DIAGNOSIS:  Osteoarthritis    POSTOPERATIVE DIAGNOSIS:  Osteoarthritis    OPERATION PERFORMED:  left total hip arthroplasty    IMPLANTS:    Implant Name Type Inv. Item Serial No.  Lot No. LRB No. Used Action   48 MM OD SHELL Total Joint Component/Insert   [employer] [id number] Left 1 Implanted   IMP SCR ACET SNN SPHERICAL HEAD 6.5X40MM [id number] - [id number] [employer] IMP SCR ACET SNN SPHERICAL HEAD 6.5X40MM [id number]  [employer]R [id number] Left 1 Implanted   IMP LINER S&N ACET R3 XLPE [id number] 0DEG [id number] - [id number] Total Joint Component/Insert IMP LINER S&N ACET R3 XLPE [id number] [id number]  [employer] [id number] Left 1 Implanted   IMP SCR ACET SNN SPHERICAL HEAD 6.5X30MM [id number] - [id number] [employer] IMP SCR ACET SNN SPHERICAL HEAD 6.5X30MM [id number]  [employer] [id number] Left 1 Implanted   IMP SCR ACET SNN SPHERICAL HEAD 6.5X30MM [id number] - [id number] Met[employer]/[place] IMP SCR ACET SNN SPHERICAL HEAD 6.5X30MM [id number]  [employer]R [id number] Left 1 Implanted   IMP STEM FEM HIP SNN SYNERGY POROUS SZ 11 [id number] - [id number] Total Joint Component/Insert IMP STEM FEM HIP SNN SYNERGY POROUS SZ 11 [id number]  [employer]R [id number] Left 1 Implanted   IMP HEAD FEMORAL SNR [place] 32MM +0 [id number] - [id number] Total Joint Component/Insert IMP HEAD FEMORAL SNR [place] 32MM +0 [id number]  [employer] [id number] Left 1 Implanted         ANESTHETIC: general    OPERATIVE FINDINGS:  End stage arthrosis of the hip    BLOOD LOSS: about 300 ml     COMPLICATIONS:  None apparent    OPERATIVE INDICATIONS:  The patient has a long history of debilitating pain secondary to ostearthritis of the hip.  Despite comprehensive non-operative management these symptoms continued to interfere with  activities of daily living.  After discussion of further treatment options including the risks and benefits that patient elected to proceed with a total hip.    DESCRIPTION OF THE PROCEDURE:  The patient was identified in the preoperative holding area.  The consent form including the risks and benefits were reviewed with the patient.  The operative limb was identified and marked.  The patient was brought back to the operating room and placed supine on the operating table.  An anesthetic was induced by the anesthesia team.   The patient was placed in the lateral decubitus position and prepped and draped in the normal standard fashion for a hip replacement.  A time-out was called.  Antibiotics were given.  We utilized an approximately 15 cm curvilinear incision, centered on the vastus ridge, and performed a standard posterior approach to the hip.  The tensor fascia was split.  A small portion of gluteus xiao was split in line with its fibers.  The sciatic nerve was palpated.  The east-west retractor was placed.  The posterior border of gluteus medius was exposed and retracted.  The tendon of piriformis and that of the obturators was released from their attachments.  A trapdoor posterior capsulotomy was performed.  The hip was dislocated.  The lesser trochanter was exposed.  A ruler was used to measure and electrocautery was used to pamela our neck cut as preoperatively templated.  The head was measured with a caliper and found to be 44 mm.  This measurement was used to choose our first reamer.  The neck cut was re-measured. The femur was elevated.  A Hohmann was placed over the anterior rim of the acetabulum and the femur was subluxed anterior.  A split was made in the inferior capsule.  The transverse acetabular ligament was left intact and used a guide for the anterversion of the acetabular component.  Circumferential retractors were placed. There was superior bone loss with adequate posterior and anterior walls.   "We began reaming and went up by two until sufficient contact was made with the acetabular rim.  We then went up by one millimeter for a one millimeter press-fit. The hip center of rotation was anatomic. This left a superior gap. This was packed with autologous bone graft. We were within one size of our preoperative plan.   A trail was placed.  It had an excellent press fit.  We then placed out final component in 40 degrees of inclination and approximately 20 degrees of anteversion, parallel to the transverse ligament.  The press fit was excellent.  Screws were placed for additional initial fixation.  A flat liner was then placed. It locked into place.  Attention was turned to the femur.  Retractors were placed to elevated the proximal femur and to protect the tendon of gluteus medius.  Remaining lateral neck was removed and the piriformis fossa was cleared of soft-tissue.  A box osteotome and canal finder were used to prepare for broaching.  A sharp broach was used to lateralize slightly.  We then reamed and broached up sequentially to a size 11.  It was rotationally stable and sat up 1-2 millimeters from the neck-cut.  Preoperatively the patient had templated to a standard offset stem.  The standard offset stem appropriately tensioned the abductors.  We trialed the following femoral heads: 0 and -3.  The 0 appropriately tensioned the abductors and clinically equalized the leg-lengths.  The stability exam was excellent.  The hip was stable and there was no impingement posteriorly with hyper-extension and maximal external rotation.  With full extension, the knee could be fixed to bring the foot nearly to the buttock.  With the hip in ninety degrees of flexion and neutral rotation there was greater than 60 degrees of internal rotation before subluxation.  There appropriate movement with a \"johnnie\" test.  Happy with our stability exam, the final implant was placed in approximately twenty degrees of anteversion.  It sat " within 1 mm of the broach.  We then trailed with a 0 head.  The stability exam was identical.  We then placed the final head on a clean, dry neck and impacted it into place. The hip was reduced after directly visualizing the entire acetabulum.  The wound was then irrigated.  The posterior capsule and short external rotators were sutured to the greater trochanter with non-absorbable suture through bone tunnels.The fascia was closed with interrupted Vicryl, the dermis with interrupted Vicryl, and skin with running monocryl, Dermabond and steri-strips.  At the end of the procedure the sponge and needle counts were correct times two.  The patient tolerated the procedure well and returned to the PAR extubated and stable.    POSTOPERATIVE PLAN:  1. Weight bearing as tolerated  2. Standard posterior hip precautions  3. DVT prophylaxis   4. 24 hours of prophylactic antibiotics  5. Follow-up:  Wound clinic in 2 weeks and with [facility] in clinic in 6 weeks for x-rays and a rehabilitation check.

## 2022-05-04 NOTE — INTERVAL H&P NOTE
"I have reviewed the surgical (or preoperative) H&P that is linked to this encounter, and examined the patient. There are no significant changes      Clinical Conditions Present on Arrival:  Clinically Significant Risk Factors Present on Admission                       # Obesity: Estimated body mass index is 34.18 kg/m  as calculated from the following:    Height as of this encounter: 1.638 m (5' 4.49\").    Weight as of this encounter: 91.7 kg (202 lb 2.6 oz).       "

## 2022-05-04 NOTE — PROGRESS NOTES
[facility]      OUTPATIENT PHYSICAL THERAPY EVALUATION  PLAN OF TREATMENT FOR OUTPATIENT REHABILITATION  (COMPLETE FOR INITIAL CLAIMS ONLY)  Patient's Last Name, First Name, M.I.  YOB: 1970  Milvia Nieto                        Provider's Name  [facility] Medical Record No.  [id number]                               Onset Date:  05/04/22   Start of Care Date:  05/04/22      Type:     _X_PT   ___OT   ___SLP Medical Diagnosis:  S/p L GOVIND.                        PT Diagnosis:  Impaired functional mobility.   Visits from SOC:  1   _________________________________________________________________________________  Plan of Treatment/Functional Goals    Planned Interventions: bed mobility training, gait training, home exercise program, patient/family education, ROM (range of motion), stair training, strengthening, transfer training     Goals: See Physical Therapy Goals on Care Plan in [facility] electronic health record.    Therapy Frequency: 2x/day  Predicted Duration of Therapy Intervention: 05/11/22  _________________________________________________________________________________    I CERTIFY THE NEED FOR THESE SERVICES FURNISHED UNDER        THIS PLAN OF TREATMENT AND WHILE UNDER MY CARE     (Physician co-signature of this document indicates review and certification of the therapy plan).                Certification date from: 05/04/22, Certification date to: 05/11/22    Referring Physician: Dr. Joe Genao.            Initial Assessment        See Physical Therapy evaluation dated 05/04/22 in Epic electronic health record.

## 2022-05-04 NOTE — PROGRESS NOTES
05/04/22 1500   Quick Adds   Quick Adds Certification   Type of Visit Initial PT Evaluation       Present yes   Language Hebrew   Living Environment   People in Home spouse;child(erendira), adult   Current Living Arrangements house   Home Accessibility stairs within home   Number of Stairs, Within Home, Primary greater than 10 stairs  (17)   Stair Railings, Within Home, Primary railing on left side (ascending)   Transportation Anticipated family or friend will provide   Living Environment Comments Pt lives in a house with  and children, who are able to assist.   Self-Care   Usual Activity Tolerance moderate   Current Activity Tolerance moderate   Regular Exercise No   Equipment Currently Used at Home none   Fall history within last six months no   Activity/Exercise/Self-Care Comment Pt works as a  in a [employer]. She is from [place] [facility] [facility].   General Information   Onset of Illness/Injury or Date of Surgery 05/04/22   Referring Physician Dr. Joe Genao.   Patient/Family Therapy Goals Statement (PT) To be able to walk, do stairs.   Pertinent History of Current Problem (include personal factors and/or comorbidities that impact the POC) Pt is a 51 year old female seen POD 0 s/p L GOVIND.   Existing Precautions/Restrictions no hip IR;no hip ADD past midline;90 degree hip flexion;no pivoting or twisting   Weight-Bearing Status - LLE weight-bearing as tolerated   Weight-Bearing Status - RLE full weight-bearing   Heart Disease Risk Factors Lack of physical activity;Overweight   General Observations Female supine in bed.   Cognition   Affect/Mental Status (Cognition) WNL   Orientation Status (Cognition) oriented x 4   Follows Commands (Cognition) WNL   Cognitive Status Comments Pleasant, cooperative.   Pain Assessment   Patient Currently in Pain Yes, see Vital Sign flowsheet  (8/10)   Integumentary/Edema   Integumentary/Edema Comments L hip incision.   Posture    Posture Forward head  position   Range of Motion (ROM)   ROM Comment Grossly WNL except LLE.   Strength (Manual Muscle Testing)   Strength Comments LLE limited d/t recent surgery, pain, otherwise WNL.   Bed Mobility   Comment, (Bed Mobility) Supine-sit with Tad.   Transfers   Comment, (Transfers) Sit-stand with walker, CGA.   Gait/Stairs (Locomotion)   Comment, (Gait/Stairs) Able to march in place with walker and take several small steps forwards and backwards.   Balance   Balance Comments Adequate for ambulation with AD.   Sensory Examination   Sensory Perception patient reports no sensory changes   Coordination   Coordination no deficits were identified   Muscle Tone   Muscle Tone no deficits were identified   Clinical Impression   Criteria for Skilled Therapeutic Intervention Yes, treatment indicated   PT Diagnosis (PT) Impaired functional mobility.   Influenced by the following impairments S/p surgery, pain   Functional limitations due to impairments All functional mobility.   Clinical Presentation (PT Evaluation Complexity) Stable/Uncomplicated   Clinical Presentation Rationale Clinician judgment.   Clinical Decision Making (Complexity) low complexity   Planned Therapy Interventions (PT) bed mobility training;gait training;home exercise program;patient/family education;ROM (range of motion);stair training;strengthening;transfer training   Anticipated Equipment Needs at Discharge (PT) walker, rolling   Risk & Benefits of therapy have been explained patient   PT Discharge Planning   PT Discharge Recommendation (DC Rec) home with assist;home with outpatient physical therapy   PT Rationale for DC Rec Pt has assist available at home. Anticipate safe discharge. OP PT for routine care of GOVIND.   PT Brief overview of current status PT eval completed. Pt is Tad for bed mobility, CGA for sit-stand with walker, able to march in place and take several steps at bedside with walker, CGA.   Plan of Care Review   Plan of Care Reviewed With patient    Therapy Certification   Start of care date 05/04/22   Certification date from 05/04/22   Certification date to 05/11/22   Medical Diagnosis S/p L GOVIND.   Total Evaluation Time   Total Evaluation Time (Minutes) 8   Physical Therapy Goals   PT Frequency 2x/day   PT Predicted Duration/Target Date for Goal Attainment 05/11/22   PT Goals Bed Mobility;Transfers;Gait;Stairs   PT: Bed Mobility Independent;Supine to/from sit;Within precautions   PT: Transfers Modified independent;Sit to/from stand;Within precautions   PT: Gait Modified independent;Assistive device;Within precautions;Greater than 200 feet   PT: Stairs Modified independent;Within precautions;Greater than 10 stairs;Rail on left

## 2022-05-04 NOTE — PLAN OF CARE
Goal Outcome Evaluation:                          VS:     Stable   Output:       Bowels- active in all four quadrants. Voids spontaneously without   difficulty in the bathroom. BM shortly after arriving to floor.   Activity:       Pt up with SBA and FWW.     Skin:   Incision to L-Hip     Pain:       Has pain in the -Hip and given toradol, refused oxycodone. Ice applied, and is tolerating well.      CMS:       CMS and Neuro's are intact. Denies numbness and tingling in all extremities.      Dressing:       L-Hip incisional dressing is CDI.      Diet:       Pt is on a reg diet and tolerating well.        LDA:       PIV is patent and infusing LR between abx.      Equipment:       CAPNO, FWW     Plan:     Continue to work with therapy and likely discharge home tomorrow if goals met.    Additional Info:     Mohawk speaking.  used for assessments. iPad also available.

## 2022-05-04 NOTE — CONSULTS
"[facility]  Consult Note - Hospitalist Service, GOLD TEAM   Date of Admission:  5/4/2022  Consult Requested by: Jayne Saha PA-C  Reason for Consult: Co-medical management    Assessment & Plan   Milvia Carrasquillo is a very pleasant 51 year-old  female admitted on 5/4/2022. Patient underwent total left hip arthroplasty 2/2 osteoarthritis on 5/4/2022 with Dr. Joe Genao. Internal medicine was consulted for co-medical management. Patient's pertinent PMH is only (+) hypertension which is currently controlled with amlodipine and losartan. Patient reports 8/10 left hip pain, but is refusing additional pain medication.    #Status post total left hip arthoplasty  -Patient pain appears suboptimally controlled, patient patient is refusing additional medication  -DVT prophylaxis per orthopedic surgery  -Continue PRN pain control; encouraged patient to request additional pain medication if necessary    #Hypertension  -Continue home Norvasc and losartan  -Continue to closely monitor blood pressure  -Make any further medication adjustments as necessary    #Obesity  -Encourage weight loss      DVT prophylaxis:  mg PO daily (per ortho surgx)  GI stress ulcer prophylaxis: PO diet; not indicated at present    The patient's care was discussed with the Bedside Nurse and Patient.    J Carlos Olivas DO  [facility]  Securely message with the Vocera Web Console (learn more here)  Text page via [employer] Paging/Directory   Please see signed in provider for up to date coverage information    Hospitalist Service, GOLD TEAM     Clinically Significant Risk Factors Present on Admission                  # Obesity: Estimated body mass index is 34.18 kg/m  as calculated from the following:    Height as of this encounter: 1.638 m (5' 4.49\").    Weight as of this encounter: 91.7 kg (202 lb 2.6 oz).  "     ______________________________________________________________________    Chief Complaint   Patient underwent total left hip arthroplasty 2/2 osteoarthritis on 5/4/2022 with Dr. Joe Genao.    History is obtained from the patient. Patient's daughter was present at bedside for translation. Patient is primarily Turkish-speaking, but can understand English as well.    History of Present Illness   Milvia Carrasquillo is a very pleasant 51 year-old  female admitted on 5/4/2022. Patient underwent total left hip arthroplasty 2/2 osteoarthritis on 5/4/2022 with Dr. Joe Genao. Internal medicine was consulted for co-medical management. Patient pertinent PMH is only (+) hypertension which is currently controlled with amlodipine and losartan. Patient reports 8/10 left hip pain, but is refusing additional pain medication.    Review of Systems   Patient denies fever, sweats, chills.  Patient denies headache, visual disturbances.  Patient denies chest pain, SOB.  Patient denies abdominal pain, nausea, vomiting.  Patient reports regular bowel movements.  Patient denies urinary symptoms.  Patient reports 8/10 left hip discomfort.    Past Medical History    I have reviewed this patient's medical history and updated it with pertinent information if needed.   History reviewed. No pertinent past medical history.    Past Surgical History   I have reviewed this patient's surgical history and updated it with pertinent information if needed.  History reviewed. No pertinent surgical history.    Social History   I have reviewed this patient's social history and updated it with pertinent information if needed.  Social History     Tobacco Use     Smoking status: Never Smoker     Smokeless tobacco: Never Used       Family History     No reported family hx.    Medications   Medications Prior to Admission   Medication Sig Dispense Refill Last Dose     amLODIPine (NORVASC) 10 MG tablet Take 1 tablet (10 mg) by mouth in  the morning. 90 tablet 3 5/4/2022 at 0600     ibuprofen (ADVIL/MOTRIN) 200 MG capsule Take 200 mg by mouth every 4 hours as needed for fever    Past Week at Unknown time     losartan (COZAAR) 100 MG tablet Take 1 tablet (100 mg) by mouth in the morning. 90 tablet 3 5/4/2022 at 0600     naproxen (NAPROSYN) 500 MG tablet Take 1 tablet (500 mg) by mouth 2 times daily (with meals) 60 tablet 0 Past Week at Unknown time       Allergies   No Known Allergies    Physical Exam   Vital Signs: Temp: 99  F (37.2  C) Temp src: Oral BP: 127/61 Pulse: 105   Resp: 22 SpO2: 92 % O2 Device: None (Room air) Oxygen Delivery: 1 LPM  Weight: 202 lbs 2.59 oz    GENERAL: Patient appears well; no acute distress.  HEENT: Normocephalic; atraumatic; PERRLA; MMM.  CV: RRR; normal S1, S2; (+) systolic ejection murmur.  RESP: Lung fields clear to aucultation B/L; no wheezing or crepitations.  GI: Abdomen is soft, nontender, nondistended; no organomegaly; normal bowel sounds.  : Deferred genital examination.   MSK: No clubbing, cyanosis, or edema.  DERM: Skin is intact; no rash, lesions, or skin breakdown.  NEURO: No focal deficits appreciated; strength & sensorium are grossly intact.  PSYCH: No active hallucinations; affect, insight appear within normal limits.    Data   Results for orders placed or performed during the hospital encounter of 05/04/22 (from the past 24 hour(s))   Glucose by meter   Result Value Ref Range    GLUCOSE BY METER POCT 120 (H) 70 - 99 mg/dL   CBC with platelets   Result Value Ref Range    WBC Count 10.0 4.0 - 11.0 10e3/uL    RBC Count 5.14 3.80 - 5.20 10e6/uL    Hemoglobin 13.1 11.7 - 15.7 g/dL    Hematocrit 41.2 35.0 - 47.0 %    MCV 80 78 - 100 fL    MCH 25.5 (L) 26.5 - 33.0 pg    MCHC 31.8 31.5 - 36.5 g/dL    RDW 16.1 (H) 10.0 - 15.0 %    Platelet Count 274 150 - 450 10e3/uL   XR Pelvis w Hip Port Left  1 View    Narrative    2 views left hip/pelvis radiographs 5/4/2022 2:21 PM    History: Status post Hip  surgery    Comparison: 3/3/2022    Findings:    AP view pelvis, frog leg lateral views of the left hip were obtained.  Frog-leg lateral view is suboptimal due to underpenetration.    No acute osseous abnormality.      New left total hip arthroplasty with soft tissue gas.    No substantial degenerative change of the right hip.    Presumed fallopian tube occluder devices.      Impression    IMPRESSION: New left total hip arthroplasty.    [email or web address]         SYSTEM ID:  [id number]

## 2022-05-05 ENCOUNTER — APPOINTMENT (OUTPATIENT)
Dept: PHYSICAL THERAPY | Facility: CLINIC | Age: 52
End: 2022-05-05
Attending: ORTHOPAEDIC SURGERY
Payer: COMMERCIAL

## 2022-05-05 ENCOUNTER — APPOINTMENT (OUTPATIENT)
Dept: OCCUPATIONAL THERAPY | Facility: CLINIC | Age: 52
End: 2022-05-05
Payer: COMMERCIAL

## 2022-05-05 VITALS
OXYGEN SATURATION: 96 % | RESPIRATION RATE: 18 BRPM | WEIGHT: 202.16 LBS | HEIGHT: 64 IN | HEART RATE: 83 BPM | SYSTOLIC BLOOD PRESSURE: 136 MMHG | BODY MASS INDEX: 34.51 KG/M2 | TEMPERATURE: 97.1 F | DIASTOLIC BLOOD PRESSURE: 63 MMHG

## 2022-05-05 LAB
ALBUMIN SERPL-MCNC: 3.3 G/DL (ref 3.4–5)
ALP SERPL-CCNC: 200 U/L (ref 40–150)
ALT SERPL W P-5'-P-CCNC: 73 U/L (ref 0–50)
ANION GAP SERPL CALCULATED.3IONS-SCNC: 9 MMOL/L (ref 3–14)
AST SERPL W P-5'-P-CCNC: ABNORMAL U/L
BASOPHILS # BLD AUTO: 0 10E3/UL (ref 0–0.2)
BASOPHILS NFR BLD AUTO: 0 %
BILIRUB SERPL-MCNC: 0.4 MG/DL (ref 0.2–1.3)
BUN SERPL-MCNC: 17 MG/DL (ref 7–30)
CALCIUM SERPL-MCNC: 8.8 MG/DL (ref 8.5–10.1)
CHLORIDE BLD-SCNC: 106 MMOL/L (ref 94–109)
CO2 SERPL-SCNC: 22 MMOL/L (ref 20–32)
CREAT SERPL-MCNC: 0.84 MG/DL (ref 0.52–1.04)
EOSINOPHIL # BLD AUTO: 0 10E3/UL (ref 0–0.7)
EOSINOPHIL NFR BLD AUTO: 0 %
ERYTHROCYTE [DISTWIDTH] IN BLOOD BY AUTOMATED COUNT: 16.5 % (ref 10–15)
GFR SERPL CREATININE-BSD FRML MDRD: 83 ML/MIN/1.73M2
GLUCOSE BLD-MCNC: 145 MG/DL (ref 70–99)
GLUCOSE BLDC GLUCOMTR-MCNC: 136 MG/DL (ref 70–99)
HBA1C MFR BLD: 7.3 % (ref 0–5.6)
HCT VFR BLD AUTO: 31.5 % (ref 35–47)
HGB BLD-MCNC: 10.1 G/DL (ref 11.7–15.7)
IMM GRANULOCYTES # BLD: 0.2 10E3/UL
IMM GRANULOCYTES NFR BLD: 2 %
LYMPHOCYTES # BLD AUTO: 2 10E3/UL (ref 0.8–5.3)
LYMPHOCYTES NFR BLD AUTO: 16 %
MCH RBC QN AUTO: 26 PG (ref 26.5–33)
MCHC RBC AUTO-ENTMCNC: 32.1 G/DL (ref 31.5–36.5)
MCV RBC AUTO: 81 FL (ref 78–100)
MONOCYTES # BLD AUTO: 1 10E3/UL (ref 0–1.3)
MONOCYTES NFR BLD AUTO: 8 %
NEUTROPHILS # BLD AUTO: 9 10E3/UL (ref 1.6–8.3)
NEUTROPHILS NFR BLD AUTO: 74 %
NRBC # BLD AUTO: 0 10E3/UL
NRBC BLD AUTO-RTO: 0 /100
PLATELET # BLD AUTO: 242 10E3/UL (ref 150–450)
POTASSIUM BLD-SCNC: 4.5 MMOL/L (ref 3.4–5.3)
PROT SERPL-MCNC: 7.5 G/DL (ref 6.8–8.8)
RBC # BLD AUTO: 3.89 10E6/UL (ref 3.8–5.2)
SODIUM SERPL-SCNC: 137 MMOL/L (ref 133–144)
WBC # BLD AUTO: 12.3 10E3/UL (ref 4–11)

## 2022-05-05 PROCEDURE — 250N000011 HC RX IP 250 OP 636

## 2022-05-05 PROCEDURE — 83036 HEMOGLOBIN GLYCOSYLATED A1C: CPT | Performed by: INTERNAL MEDICINE

## 2022-05-05 PROCEDURE — 250N000013 HC RX MED GY IP 250 OP 250 PS 637: Performed by: INTERNAL MEDICINE

## 2022-05-05 PROCEDURE — 82962 GLUCOSE BLOOD TEST: CPT

## 2022-05-05 PROCEDURE — 97535 SELF CARE MNGMENT TRAINING: CPT | Mod: GO

## 2022-05-05 PROCEDURE — 84155 ASSAY OF PROTEIN SERUM: CPT | Performed by: INTERNAL MEDICINE

## 2022-05-05 PROCEDURE — 97116 GAIT TRAINING THERAPY: CPT | Mod: GP

## 2022-05-05 PROCEDURE — 97165 OT EVAL LOW COMPLEX 30 MIN: CPT | Mod: GO

## 2022-05-05 PROCEDURE — 99213 OFFICE O/P EST LOW 20 MIN: CPT | Performed by: INTERNAL MEDICINE

## 2022-05-05 PROCEDURE — 97530 THERAPEUTIC ACTIVITIES: CPT | Mod: GP

## 2022-05-05 PROCEDURE — 36415 COLL VENOUS BLD VENIPUNCTURE: CPT | Performed by: INTERNAL MEDICINE

## 2022-05-05 PROCEDURE — 85025 COMPLETE CBC W/AUTO DIFF WBC: CPT | Performed by: INTERNAL MEDICINE

## 2022-05-05 PROCEDURE — 250N000013 HC RX MED GY IP 250 OP 250 PS 637

## 2022-05-05 RX ORDER — ACETAMINOPHEN 325 MG/1
650 TABLET ORAL EVERY 4 HOURS PRN
Qty: 60 TABLET | Refills: 0 | Status: SHIPPED | OUTPATIENT
Start: 2022-05-07 | End: 2024-06-14

## 2022-05-05 RX ORDER — POLYETHYLENE GLYCOL 3350 17 G/17G
17 POWDER, FOR SOLUTION ORAL DAILY
Qty: 10 PACKET | Refills: 0 | Status: SHIPPED | OUTPATIENT
Start: 2022-05-05 | End: 2022-05-27

## 2022-05-05 RX ORDER — OXYCODONE HYDROCHLORIDE 5 MG/1
5 TABLET ORAL EVERY 4 HOURS PRN
Qty: 40 TABLET | Refills: 0 | Status: SHIPPED | OUTPATIENT
Start: 2022-05-05 | End: 2022-05-27

## 2022-05-05 RX ORDER — AMOXICILLIN 250 MG
1 CAPSULE ORAL 2 TIMES DAILY
Qty: 30 TABLET | Refills: 0 | Status: SHIPPED | OUTPATIENT
Start: 2022-05-05 | End: 2022-05-27

## 2022-05-05 RX ADMIN — POLYETHYLENE GLYCOL 3350 17 G: 17 POWDER, FOR SOLUTION ORAL at 08:10

## 2022-05-05 RX ADMIN — SENNOSIDES AND DOCUSATE SODIUM 1 TABLET: 50; 8.6 TABLET ORAL at 08:10

## 2022-05-05 RX ADMIN — CEFAZOLIN SODIUM 2 G: 2 INJECTION, SOLUTION INTRAVENOUS at 01:49

## 2022-05-05 RX ADMIN — LOSARTAN POTASSIUM 100 MG: 100 TABLET, FILM COATED ORAL at 08:10

## 2022-05-05 RX ADMIN — ACETAMINOPHEN 975 MG: 325 TABLET ORAL at 05:53

## 2022-05-05 RX ADMIN — AMLODIPINE BESYLATE 10 MG: 10 TABLET ORAL at 08:10

## 2022-05-05 RX ADMIN — KETOROLAC TROMETHAMINE 15 MG: 30 INJECTION, SOLUTION INTRAMUSCULAR at 04:41

## 2022-05-05 RX ADMIN — ASPIRIN 162 MG: 81 TABLET ORAL at 08:10

## 2022-05-05 NOTE — PLAN OF CARE
Physical Therapy Discharge Summary    Reason for therapy discharge:    All goals and outcomes met, no further needs identified.    Progress towards therapy goal(s). See goals on Care Plan in [facility] electronic health record for goal details.  Goals met    Therapy recommendation(s):    Continued therapy is recommended.  Rationale/Recommendations:  for progression per protocol and return to PLOF.

## 2022-05-05 NOTE — PLAN OF CARE
"/59 (BP Location: Left arm)   Pulse 108   Temp 99  F (37.2  C) (Oral)   Resp 23   Ht 1.638 m (5' 4.49\")   Wt 91.7 kg (202 lb 2.6 oz)   SpO2 92%   BMI 34.18 kg/m      A&Ox4, Frisian speaking mostly. VSS. CAPNO postoperatively, maintaining O2 sats >90% on RA. Lungs CTA. Heart RRR. Abd soft, NT, BS+x4Q. LBM 5/4. Hip dressing CDI, no drainage. Ambulates with assist of 1 using FWW+GB. Voiding without difficulty. [place] Reg diet. Tylenol & Toradol effective for pain per pt. CMS intact ex complains of numbness to L heel which pt reports improving. Hip precautions maintained. Wedge pillow while in bed. Calls appropriately for assistance as needed. Call light within reach. Continue POC.                          "

## 2022-05-05 NOTE — PROGRESS NOTES
05/05/22 0928   Quick Adds   Type of Visit Initial Occupational Therapy Evaluation   Living Environment   People in Home spouse;child(erendira), adult   Current Living Arrangements house   Home Accessibility stairs within home   Number of Stairs, Within Home, Primary greater than 10 stairs   Stair Railings, Within Home, Primary railing on left side (ascending)   Transportation Anticipated family or friend will provide   Living Environment Comments Pt lives in a house with  and children, who are able to assist. Pt has walk in shower and  low toilet   Self-Care   Usual Activity Tolerance moderate   Current Activity Tolerance fair   Equipment Currently Used at Home shower chair   Fall history within last six months no   Activity/Exercise/Self-Care Comment Pt previously IND with ADLs   General Information   Onset of Illness/Injury or Date of Surgery 05/04/22   Referring Physician Joe Genao MD   Patient/Family Therapy Goal Statement (OT) Not endorsed   Additional Occupational Profile Info/Pertinent History of Current Problem Milvia Carrasquillo is a very pleasant 51 year-old  female admitted on 5/4/2022. Patient underwent total left hip arthroplasty 2/2 osteoarthritis on 5/4/2022 with Dr. Joe Genao   Existing Precautions/Restrictions fall;no hip IR;no hip ADD past midline;90 degree hip flexion   Left Lower Extremity (Weight-bearing Status) weight-bearing as tolerated (WBAT)   Cognitive Status Examination   Orientation Status orientation to person, place and time   Visual Perception   Visual Impairment/Limitations WFL   Sensory   Sensory Quick Adds No deficits were identified   Pain Assessment   Patient Currently in Pain Yes, see Vital Sign flowsheet  (3/10)   Integumentary/Edema   Integumentary/Edema Comments Post op swelling   Posture   Posture not impaired   Range of Motion Comprehensive   General Range of Motion bilateral upper extremity ROM WFL   Comment, General Range of Motion L hip  limited post op   Strength Comprehensive (MMT)   Comment, General Manual Muscle Testing (MMT) Assessment Pt is slightly generally deconditioned   Muscle Tone Assessment   Muscle Tone Quick Adds No deficits were identified   Coordination   Upper Extremity Coordination No deficits were identified   Transfers   Transfers sit-stand transfer;toilet transfer   Sit-Stand Transfer   Sit-Stand [place] (Transfers) supervision;verbal cues;nonverbal cues (demo/gesture)   Assistive Device (Sit-Stand Transfers) walker, front-wheeled   Toilet Transfer   Type (Toilet Transfer) sit-stand;stand-sit   Buchanan Level (Toilet Transfer) supervision;verbal cues;nonverbal cues (demo/gesture)   Assistive Device (Toilet Transfer) grab bars/safety frame   Activities of Daily Living   BADL Assessment/Intervention lower body dressing   Lower Body Dressing Assessment/Training   Position (Lower Body Dressing) supported sitting   Assistive Devices (Lower Body Dressing) reacher;sock-aid   [place]uchanan Level (Lower Body Dressing) supervision;verbal cues;nonverbal cues (demo/gesture)   Clinical Impression   Criteria for Skilled Therapeutic Interventions Met (OT) Yes, treatment indicated   OT Diagnosis Decreased IND with ADLs   OT Problem List-Impairments impacting ADL activity tolerance impaired;balance;post-surgical precautions;pain;strength   Assessment of Occupational Performance 1-3 Performance Deficits   Identified Performance Deficits dressing, bathing, toileting   Planned Therapy Interventions (OT) ADL retraining;balance training;bed mobility training;transfer training   Clinical Decision Making Complexity (OT) low complexity   Anticipated Equipment Needs Upon Discharge (OT) bathing equipment;dressing equipment   Risk & Benefits of therapy have been explained evaluation/treatment results reviewed;care plan/treatment goals reviewed;risks/benefits reviewed;current/potential barriers reviewed;participants voiced agreement with care  plan;participants included;patient;spouse/significant other   OT Discharge Planning   OT Discharge Recommendation (DC Rec) home with assist   Therapy Certification   Start of Care Date 05/05/22   Certification date from 05/05/22   Certification date to 05/12/22   Medical Diagnosis S/p L GOVIND   Total Evaluation Time (Minutes)   Total Evaluation Time (Minutes) 10   OT Goals   Therapy Frequency (OT) One time eval and treatment   OT Predicted Duration/Target Date for Goal Attainment 05/12/22   OT Goals Lower Body Dressing;Toilet Transfer/Toileting   OT: Lower Body Dressing Supervision/stand-by assist;using adaptive equipment;within precautions;Goal Met;Completed   OT: Toilet Transfer/Toileting Supervision/stand-by assist;toilet transfer;using adaptive equipment;cleaning and garment management;within precautions;Goal Met;Completed

## 2022-05-05 NOTE — PROGRESS NOTES
[facility]      OUTPATIENT OCCUPATIONAL THERAPY  EVALUATION  PLAN OF TREATMENT FOR OUTPATIENT REHABILITATION  (COMPLETE FOR INITIAL CLAIMS ONLY)  Patient's Last Name, First Name, M.I.  YOB: 1970  Milvia Nieto                          Provider's Name  [facility] Medical Record No.  [id number]                               Onset Date:  05/04/22   Start of Care Date:  05/05/22     Type:     ___PT   _X_OT   ___SLP Medical Diagnosis:  S/p L GOVIND                        OT Diagnosis:  Decreased IND with ADLs   Visits from SOC:  1   _________________________________________________________________________________  Plan of Treatment/Functional Goals    Planned Interventions: ADL retraining, balance training, bed mobility training, transfer training   Goals: See Occupational Therapy Goals on Care Plan in [employer] electronic health record.    Therapy Frequency: One time eval and treatment  Predicted Duration of Therapy Intervention: 05/12/22  _________________________________________________________________________________    I CERTIFY THE NEED FOR THESE SERVICES FURNISHED UNDER        THIS PLAN OF TREATMENT AND WHILE UNDER MY CARE     (Physician co-signature of this document indicates review and certification of the therapy plan).              Certification date from: 05/05/22, Certification date to: 05/12/22    Referring Physician: Joe Genao MD            Initial Assessment        See Occupational Therapy evaluation dated 05/05/22 in Epic electronic health record.

## 2022-05-05 NOTE — PROGRESS NOTES
[facility]    Medicine Progress Note - Hospitalist Service, GOLD TEAM 16    Date of Admission:  5/4/2022    Assessment & Plan             Milvia Carrasquillo is a very pleasant 51 year-old  female admitted on 5/4/2022. Patient underwent total left hip arthroplasty 2/2 osteoarthritis on 5/4/2022 with Dr. Joe Genao. Internal medicine was consulted for co-medical management. Patient's pertinent PMH is only (+) hypertension which is currently controlled with amlodipine and losartan.      #Status post total left hip arthoplasty  -Patient pain appears  To be well controlled  -DVT prophylaxis per orthopedic surgery ( aspirin)  -Continue PRN pain control; encouraged patient to request additional pain medication if necessary  PT/ OT as per protocol      #Hypertension  -Continue home Norvasc and losartan  -Continue to closely monitor blood pressure  -Post op BMP within acceptable limits      #Metabolic syndrome  # Type 2 DM ( A1C at 7.3)  -Encourage weight loss  - Diabetes in a new diagnoses for patient. Encouraged patient to discuss this with PCP to discuss dietary modification, education, and consideration of oral hypoglycemics, which will also help with weight loss ( metfromin, Liraglutide etc)          Diet: Advance Diet as Tolerated: Regular Diet Adult  Regular Diet Adult    DVT Prophylaxis: Aspirin 162 mg   Sharpe Catheter: Not present  Central Lines: None  Cardiac Monitoring: None  Code Status: Full Code      Disposition Plan   Expected Discharge: 05/06/2022         The patient's care was discussed with the Bedside Nurse, Care Coordinator/, Patient and Primary team.    Demond Luo MD  Hospitalist Service, GOLD TEAM 16  [facility]  Securely message with the Vocera Web Console (learn more here)  Text page via [employer] Paging/Directory   Please see signed in provider for up to date  "coverage information      Clinically Significant Risk Factors Present on Admission             # Hypoalbuminemia: Albumin = 3.3 g/dL (Ref range: 3.4 - 5.0 g/dL) on admission, will monitor as appropriate      # Diabetes, type II: last A1C 7.3 % (Ref range: 0.0 - 5.6 %)  # Obesity: Estimated body mass index is 34.18 kg/m  as calculated from the following:    Height as of this encounter: 1.638 m (5' 4.49\").    Weight as of this encounter: 91.7 kg (202 lb 2.6 oz).      ______________________________________________________________________    Interval History   No acute complaints overnight  Patient is in a pleasant mood   no chest pain or SOB  No fever or chills  Eating and drinking well      Data reviewed today: I reviewed all medications, new labs and imaging results over the last 24 hours. I personally reviewed no images or EKG's today.    Physical Exam   Vital Signs: Temp: 99  F (37.2  C) Temp src: Oral BP: 106/59 Pulse: 108   Resp: 23 SpO2: 92 % O2 Device: None (Room air) Oxygen Delivery: 1 LPM  Weight: 202 lbs 2.59 oz  General Appearance: Awake, alert and not in distress  Respiratory: Clear breath sounds bilaterally   Cardiovascular: Normal heart sounds. No murmurs   GI: Soft, non tender. Normal bowel sounds   Skin: No bruising or bleeding   MSK:Left hip with intact dressings. No distal neurovascular deficit   Other:Awake, alert and orientated X 3       Data   Recent Labs   Lab 05/05/22  0557 05/05/22  0509 05/04/22  0918 05/04/22  0804   WBC  --  12.3* 10.0  --    HGB  --  10.1* 13.1  --    MCV  --  81 80  --    PLT  --  242 274  --    NA  --  137  --   --    POTASSIUM  --  4.5  --   --    CHLORIDE  --  106  --   --    CO2  --  22  --   --    BUN  --  17  --   --    CR  --  0.84  --   --    ANIONGAP  --  9  --   --    SALVATORE  --  8.8  --   --    * 145*  --  120*   ALBUMIN  --  3.3*  --   --    PROTTOTAL  --  7.5  --   --    BILITOTAL  --  0.4  --   --    ALKPHOS  --  200*  --   --    ALT  --  73*  --   --  "

## 2022-05-05 NOTE — PROGRESS NOTES
Orthopaedic Surgery Progress Note 05/05/2022    S: No acute events overnight.  Pain controlled. Ambulating with walker. Has similar shooting pain to leg/foot as pre-op    O:  Temp: 99  F (37.2  C) Temp src: Oral BP: 106/59 Pulse: 108   Resp: 23 SpO2: 92 % O2 Device: None (Room air) Oxygen Delivery: 1 LPM    Exam:  Gen: No acute distress, resting comfortably in bed.  Resp: Non-labored breathing  MSK:  LLE:  - Dressings c/d/i  - SILT tibial/sural/saphenous/DP/SP nerves  - Fires TA, EHL, FHL, GaSC  - foot wwp        Recent Labs   Lab 05/05/22  0509 05/04/22  0918   WBC 12.3* 10.0   HGB 10.1* 13.1    274         Assessment/Plan: Milvia Carrasquillo is a 51F s/p Left GOVIND on 5/4/2022 with Dr. Genao. Doing well.     Activity: Posterior hip precautions x3 months  Weight bearing status: WBAT  Antibiotics: Cefazolin x 24 hours  Diet: Regular. Bowel regimen. Anti-emetics PRN.    DVT prophylaxis: Mechanical + ASA x4 weeks  Wound Care: Alginate tegaderm x4 weeks  Drains: none  Pain management: Orals PRN, IV for breakthrough only  X-rays: PACU  Physical Therapy: Mobilization, ROM, ADL's  Occupational Therapy: ADL's  Labs: Trend Hgb on POD #1, 2   Consults: PT, OT, Medicine. Appreciate assistance in caring for this patient throughout the perioperative period  Disposition: Pending progress with therapies, pain control on orals, and medical stability, anticipate discharge to home on POD 1            Future Appointments   Date Time Provider Department Center   5/4/2022  1:00 PM Conchita Elena [place]   5/4/2022  3:00 PM Kris Clark PT [facility] [place]   5/5/2022  8:45 AM Shahida Velasquez PT [facility] [place]   5/5/2022 11:15 AM Deborah Siddiqui OT [facility] [place]   5/5/2022  1:30 PM Shahida Velasquez PT [facility] [place]   5/17/2022  2:30 PM Birdie Gil PA-C [facility]   5/27/2022  1:00 PM Henny Nguyễn MD [facility]   6/16/2022  2:00 PM Joe Genao [place] [facility]          Patient  discussed with Dr Genao.    Marko Harrison MD  Orthopaedic Surgery PGY-4

## 2022-05-05 NOTE — PROGRESS NOTES
Care Management Discharge Note    Discharge Date: 05/05/2022       Discharge Disposition:  Home with outpatient physical therapy    Discharge DME:      Discharge Transportation: family or friend will provide    Handoff Referral Completed: Yes    Additional Information:  Outpatient physical therapy referral sent to Centralized Scheduling for PT appointment set up. RNCC available as needed.    Paty Degroot RN, BSN  Care Coordinator, [facility] Orth[facility]  Phone [phone]  Pager [phone]

## 2022-05-05 NOTE — PHARMACY-ADMISSION MEDICATION HISTORY
Admission Medication History Completed by Pharmacy    See [facility] Admission Navigator for allergy information, preferred outpatient pharmacy, prior to admission medications and immunization status.     Medication History Sources:     Patient interview    Surescripts dispense report    Changes made to PTA medication list (reason):    Added: None    Deleted: None    Changed: None    Additional Information:    Naproxen started by sports medicine during 3/2/22 office visit. Pre-op, was taking this quite regularly as well as ibuprofen occasionally as needed.     Prior to Admission medications    Medication Sig Last Dose Taking? Auth Provider   amLODIPine (NORVASC) 10 MG tablet Take 1 tablet (10 mg) by mouth in the morning. 5/4/2022 at 0600 Yes Ehsan Koehler MD   ibuprofen (ADVIL/MOTRIN) 200 MG capsule Take 200 mg by mouth every 4 hours as needed for fever  Past Week at Unknown time Yes Reported, Patient   losartan (COZAAR) 100 MG tablet Take 1 tablet (100 mg) by mouth in the morning. 5/4/2022 at 0600 Yes Ehsan Koehler MD   naproxen (NAPROSYN) 500 MG tablet Take 1 tablet (500 mg) by mouth 2 times daily (with meals) Past Week at Unknown time Yes Joe Franklin MD       Date completed: 05/04/22    Medication history completed by: Tino Dueñas [facility]

## 2022-05-05 NOTE — PLAN OF CARE
"                VS: Blood pressure 136/63, pulse 83, temperature 97.1  F (36.2  C), temperature source Oral, resp. rate 18, height 1.638 m (5' 4.49\"), weight 91.7 kg (202 lb 2.6 oz), SpO2 96 %.   O2: >90% on RA. Denies SOB/chest pain   Output: Voiding spontaneously   Last BM: 5/4/2022 per pt report   Activity: Assist of 1 gb/walker   Skin: Intact except for L hip incision   Pain: Managed with scheduled tylenol.   Pt does not want any narcotics   CMS: Intact   Dressing: L hip alginate dressing CDI   Diet: Regular diet   LDA: No IV access   Equipment: IV pole/pump; capnography; personal belongings   Plan: Anticipate discharge to home today   Additional Info:         DISCHARGE SUMMARY    Pt discharging to: Home  Transportation: Family ride  AVS given and discussed: Yes  Stoplight Tool given and discussed: Yes  Medications given: Yes  Belongings returned: Yes  Comments: N/A        "

## 2022-05-05 NOTE — PROGRESS NOTES
Occupational Therapy Discharge Summary    Reason for therapy discharge:    All goals and outcomes met, no further needs identified.    Progress towards therapy goal(s). See goals on Care Plan in [facility] electronic health record for goal details.  Goals met    Therapy recommendation(s):    No further therapy is recommended.

## 2022-05-06 NOTE — DISCHARGE SUMMARY
ORTHOPAEDIC DISCHARGE SUMMARY     Date of Admission: 5/4/2022  Date of Discharge: 5/5/2022  Disposition: Home  Staff Physician: Joe Genao MD  Primary Care Provider: Self, F=Referred    DISCHARGE DIAGNOSIS:  Primary osteoarthritis of left hip [M16.12]    PROCEDURES: Procedure(s):  ARTHROPLASTY, LEFT HIP, TOTAL on 5/4/2022    BRIEF HISTORY:  51-year-old female with left hip osteoarthritis refractory to conservative management.  Elected to undergo total hip arthroplasty.    HOSPITAL COURSE:    Surgery was uncomplicated. Milvia Carrasquillo has done well post-operatively. Medicine was consulted post operatively to aid in management of medical comorbidities. See final recommendations below. The patient received routine nursing cares and is medically stable. Vital signs are stable. The patient is tolerating a regular diet without GI distress/nausea or vomiting. Voiding spontaneously. All PT/OT goals have been met for safe mobility. Pain is now controlled on oral medications which will be available on discharge. Stool softeners have been used while taking pain medications to help prevent constipation. Milvia Carrasquillo is deemed medically safe to discharge.     Antibiotics:   Given periop and 24 hours postop.   DVT prophylaxis:  ASA daily for 6 weeks  PT Progress:  Has met PT/OT goals for safe mobility.    Pain Meds:  Weaned off all IV pain meds by discharge.  Inpatient Events: No significant events or complications.     Discharge orders and instructions as below.    FOLLOWUP:    Follow up with Dr. Genao's team at 2 weeks postoperatively.    Appointments on [facility]. Call [phone] if you haven't heard regarding these appointments within 7 days of discharge.    PLANNED DISCHARGE ORDERS:     DVT Prophylaxis: ASA      Discharge Medication List as of 5/5/2022 10:33 AM      START taking these medications    Details   acetaminophen (TYLENOL) 325 MG tablet Take 2 tablets  "(650 mg) by mouth every 4 hours as needed for other, Disp-60 tablet, R-0, E-PrescribeDischarge today      aspirin (ASA) 81 MG EC tablet Take 2 tablets (162 mg) by mouth daily, Disp-60 tablet, R-0, E-Prescribe      oxyCODONE (ROXICODONE) 5 MG tablet Take 1 tablet (5 mg) by mouth every 4 hours as needed, Disp-40 tablet, R-0, E-Prescribe      polyethylene glycol (MIRALAX) 17 g packet Take 17 g by mouth daily, Disp-10 packet, R-0, E-Prescribe      senna-docusate (SENOKOT-S/PERICOLACE) 8.6-50 MG tablet Take 1 tablet by mouth 2 times daily, Disp-30 tablet, R-0, E-Prescribe         CONTINUE these medications which have NOT CHANGED    Details   amLODIPine (NORVASC) 10 MG tablet Take 1 tablet (10 mg) by mouth in the morning., Disp-90 tablet, R-3, E-Prescribe      losartan (COZAAR) 100 MG tablet Take 1 tablet (100 mg) by mouth in the morning., Disp-90 tablet, R-3, E-Prescribe         STOP taking these medications       ibuprofen (ADVIL/MOTRIN) 200 MG capsule Comments:   Reason for Stopping:         naproxen (NAPROSYN) 500 MG tablet Comments:   Reason for Stopping:                 Discharge Procedure Orders   Physical Therapy Referral   Referral Priority: Routine Referral Type: Rehab Therapy Physical Therapy   Number of Visits Requested: 1     Reason for your hospital stay   Order Comments: You stayed in the hospital overnight following your surgery     Contact Surgeon Team   Order Comments: You may experience symptoms that require follow-up before your scheduled appointment. Refer to the \"[employer]\" for instructions on when to contact Dr. Genao's office if you are concerned about pain control, blood clots, constipation, or if you are unable to urinate.    Regular business hours (Monday - Friday, 8am - 5pm):  [facility]: [phone]  [facility]: [phone]  [facility]: [phone]    After hours and " weekends:  [facility] on call Orthopedic resident: [phone]     Orthopedic Urgent Care   Order Comments: If you are not able to reach Dr. Genao's office and you need immediate care, go to the Orthopedic Urgent Care at your Surgeon's office.  Do NOT go to the Emergency Room unless you have shortness of breath, chest pain, or other signs of a medical emergency.     Call 911   Order Comments: Call 911 immediately if you experience sudden-onset chest pain, arm weakness/numbness, slurred speech, or shortness of breath     Breathing exercises   Order Comments: Perform breathing exercises using your Incentive Spirometer 10 times per hour while awake for 2 weeks.     Fever Management   Order Comments: A low grade fever can be expected after surgery.  Use acetaminophen (TYLENOL) as needed for fever management.  Contact your Surgeon Team if you have a fever greater than 101.5 F, chills, and/or night sweats.     Constipation management   Order Comments: Constipation (hard, dry bowel movements) is expected after surgery due to the combination of being less active, the anesthetic, and the opioid pain medication.  You can do the following to help reduce constipation:  ~  FLUIDS:  Drink clear liquids (water or Gatorade), or juice (apple/prune).  ~  DIET:  Eat a fiber rich diet.    ~  ACTIVITY:  Get up and move around several times a day.  Increase your activity as you are able.  MEDICATIONS:  Reduce the risk of constipation by starting medications before you are constipated.  You can take Miralax   (1 packet as directed) and/or a stool softener (Senokot 1-2 tablets 1-2 times a day).  If you already have constipation and these medications are not working, you can get magnesium citrate and use as directed.  If you continue to have constipation you can try an over the counter suppository or enema.  Call your Surgeon Team if it has been greater than 3 days since your last bowel movement.     Reduced Urine Output    Order Comments: Changes in the amount of fluids you drank before and after surgery may result in problems urinating.  It is important to stay well-hydrated after surgery and drink plenty of water. If it has been greater than 8 hours since you have urinated despite drinking plenty of water, call your Surgeon Team.     Anticoagulation - aspirin   Order Comments: Take the aspirin as prescribed for a total of four weeks after surgery.  This is given to help minimize your risk of blood clot.     Activity - Exercises to prevent blood clots   Order Comments: Unless otherwise directed by your Surgeon team, perform the following exercises at least three times per day for the first four weeks after surgery to prevent blood clots in your legs: 1) Point and flex your feet (Ankle Pumps), 2) Move your ankle around in big circles, 3) Wiggle your toes, 4) Walk, even for short distances, several times a day, will help decrease the risk of blood clots.     Order Specific Question Answer Comments   Is discharge order? Yes      Pain after Surgery   Order Comments: Pain after surgery is normal and expected.  You will have some amount of pain for several weeks after surgery.  Your pain will improve with time.  There are several things you can do to help reduce your pain including: rest, ice, elevation, and using pain medications as needed. Contact your Surgeon Team if you have pain that persists or worsens after surgery despite rest, ice, elevation, and taking your medication(s) as prescribed. Contact your Surgeon Team if you have new numbness, tingling, or weakness in your operative extremity.     Swelling after Surgery   Order Comments: Swelling and/or bruising of the surgical extremity is common and may persist for several months after surgery. In addition to frequent icing and elevation, gentle compressive support with an ACE wrap or tubigrip may help with swelling. Apply compression regularly, removing at least twice daily to  "perform skin checks. Contact your Surgeon Team if your swelling increases and is NOT associated with an increase in your activity level, or if your swelling increases and is associated with redness and pain.     LOWER Extremity Elevation   Order Comments: Swelling is expected for several months after surgery. This type of swelling is usually associated with gravity and activity, and can be improved with elevation.   The best way to do this is to get your \"toes above your nose\" by laying down and placing several pillows lengthwise under your calf and heel. When elevating your leg keep your knee completely straight. Perform this elevation as often as possible especially for the first two weeks after surgery.     Cold therapy   Order Comments: Ice can be used to control swelling and discomfort after surgery. Place a thin towel over your operative site and apply the ice pack overtop. Leave ice pack in place for 20 minutes, then remove for 20 minutes. Repeat this 20 minutes on/20 minutes off routine as often as tolerated.     acetaminophen (TYLENOL) Instructions   Order Comments: You were discharged with acetaminophen (TYLENOL) for pain management after surgery. Acetaminophen most effectively manages pain symptoms when it is taken on a schedule without missing doses (every four, six, or eight hours). Your Provider will prescribe a safe daily dose between 3000 - 4000 mg.  Do NOT exceed this daily dose. Most patients use acetaminophen for pain control for the first four weeks after surgery.  You can wean from this medication as your pain decreases.     NSAID Instructions   Order Comments: You were discharged with an anti-inflammatory medication for pain management to use in combination with acetaminophen (TYLENOL) and the narcotic pain medication.  Take this medication exactly as directed.  You should only take one anti-inflammatory at a time.  Some common anti-inflammatories include: ibuprofen (ADVIL, MOTRIN), naproxen " "(ALEVE, NAPROSYN), celecoxib (CELEBREX), meloxicam (MOBIC), ketorolac (TORADOL).  Take this medication with food and water.     Opioids - Tapering Instructions   Order Comments: In the first three days following surgery, your symptoms may warrant use of the narcotic pain medication every four to six hours as prescribed. This is normal. As your pain symptoms improve, focus your efforts on decreasing (tapering) use of narcotic medications. The most successful tapering strategy is to first, decrease the number of tablets you take every 4-6 hours to the minimum prescribed. Then, increase the amount of time between doses.  For example:  First, taper to   or 1 tablet every 4-6 hours.  Then, taper to   or 1 tablet every 6-8 hours.  Then, taper to   or 1 tablet every 8-10 hours.  Then, taper to   or 1 tablet every 10-12 hours.  Then, taper to   or 1 tablet at bedtime.  The bedtime dose can help with comfort during sleep and is typically the last dose to be discontinued after surgery.     Follow Up Care   Order Comments: You are scheduled for a post operative wound check with Dr. Genao's clinic approximately two weeks after surgery. At approximately six weeks after surgery, you will see Dr. Genao in clinic. During this visit, repeat X rays of your operative hip will be performed.      Your follow up appointments will be at the location that you regularly see Dr. Genao:    [facility]  [address] [place] [address] [phone]    [address] [address] [phone]    [facility]  [address] [place] [id number] [phone]     Activity - Total Hip Arthroplasty   Order Comments: Refer to the [facility] \"Your Guide to Total Joint Replacement\" for recommendations on activities and Exercises.     Order Specific Question Answer Comments   Is discharge " order? Yes      Return to Driving   Order Comments: Return to driving - Driving is NOT permitted until directed by your provider. Under no circumstance are you permitted to drive while using narcotic pain medications.     Order Specific Question Answer Comments   Is discharge order? Yes      Dressing / Wound Care - Wound   Order Comments: You have a clean dressing on your surgical wound. Dressing change instructions as follows: dressing will be removed at your follow-up appointment. Contact your Surgeon Team if you have increased redness, warmth around the surgical wound, and/or drainage from the surgical wound.     Dressing / Wound Care - NO Tub Bathing   Order Comments: Tub bathing, swimming, or any other activities that will cause your incision to be submerged in water should be avoided until allowed by your Surgeon.     Opioid Instructions (Less than 65 years)   Order Comments: You were discharged with an opioid medication (hydromorphone, oxycodone, hydrocodone, or tramadol). This medication should only be taken for breakthrough pain that is not controlled with acetaminophen (TYLENOL). If you rate your pain less than 3 you do not need this medication.  Pain rating 0-3:  You do not need this medication.  Pain rating 4-6:  Take 1 tablet every 4-6 hours as needed  Pain rating 7-10:  Take 2 tablets every 4-6 hours as needed.  Do not exceed 6 tablets per day     No flexion past 90 degrees   Order Comments: No bending at waist past 90 degrees.     Order Specific Question Answer Comments   Is discharge order? Yes      No internal rotation   Order Comments: No pivoting on your operative leg.     Order Specific Question Answer Comments   Is discharge order? Yes      No adduction past midline   Order Comments: No crossing your operative leg.     Order Specific Question Answer Comments   Is discharge order? Yes      Weight bearing as tolerated   Order Comments: Weight bearing as tolerated on your operative extremity.      Order Specific Question Answer Comments   Is discharge order? Yes      Shower with wound/dressing NOT covered   Order Comments: You do not need to cover your dressing or incision in the shower, you may allow water and soap to run over top of the surgical dressing or incision. You may shower 2 days after surgery.  You are strictly prohibited from soaking or submerging the surgical wound underwater.     Crutches DME   Order Comments: DME Documentation: Describe the reason for need to support medical necessity: Impaired gait status post hip surgery. I, the undersigned, certify that the above prescribed supplies are medically necessary for this patient and is both reasonable and necessary in reference to accepted standards of medical practice in the treatment of this patient's condition and is not prescribed as a convenience.     Order Specific Question Answer Comments   DME Provider: [facility]    Crutch Type: Standard    Crutches Add On: NA    Length of Need: Lifetime      Cane DME   Order Comments: DME Documentation: Describe the reason for need to support medical necessity: Impaired gait status post hip surgery. I, the undersigned, certify that the above prescribed supplies are medically necessary for this patient and is both reasonable and necessary in reference to accepted standards of medical practice in the treatment of this patient's condition and is not prescribed as a convenience.     Order Specific Question Answer Comments   DME Provider: [facility]    Cane Type: Single Tip    Reminder: Patient can typically get 1 every 5 years      Walker DME   Order Comments: : DME Documentation: Describe the reason for need to support medical necessity: Impaired gait status post hip surgery. I, the undersigned, certify that the above prescribed supplies are medically necessary for this patient and is both reasonable and necessary in reference to accepted standards of medical practice in the treatment of this  patient's condition and is not prescribed as a convenience.     Order Specific Question Answer Comments   DME Provider: [facility]    Walker Type: Standard (2 Wheel)    Accessories: N/A      Regular Diet Adult     Order Specific Question Answer Comments   Is discharge order? Yes          Will MD Hunter 5/6/2022  Orthopaedic Surgery Resident, PGY-4  Pager: [phone]

## 2022-05-11 ENCOUNTER — THERAPY VISIT (OUTPATIENT)
Dept: PHYSICAL THERAPY | Facility: CLINIC | Age: 52
End: 2022-05-11
Attending: ORTHOPAEDIC SURGERY
Payer: COMMERCIAL

## 2022-05-11 DIAGNOSIS — Z47.1 AFTERCARE FOLLOWING LEFT HIP JOINT REPLACEMENT SURGERY: ICD-10-CM

## 2022-05-11 DIAGNOSIS — M25.552 HIP PAIN, LEFT: ICD-10-CM

## 2022-05-11 DIAGNOSIS — Z96.642 AFTERCARE FOLLOWING LEFT HIP JOINT REPLACEMENT SURGERY: ICD-10-CM

## 2022-05-11 DIAGNOSIS — R26.9 ABNORMAL GAIT: ICD-10-CM

## 2022-05-11 PROCEDURE — 97161 PT EVAL LOW COMPLEX 20 MIN: CPT | Mod: GP | Performed by: PHYSICAL THERAPIST

## 2022-05-11 PROCEDURE — 97530 THERAPEUTIC ACTIVITIES: CPT | Mod: GP | Performed by: PHYSICAL THERAPIST

## 2022-05-11 PROCEDURE — 97110 THERAPEUTIC EXERCISES: CPT | Mod: GP | Performed by: PHYSICAL THERAPIST

## 2022-05-11 ASSESSMENT — ACTIVITIES OF DAILY LIVING (ADL)
WALKING_APPROXIMATELY_10_MINUTES: UNABLE TO DO
SITTING_FOR_15_MINUTES: SLIGHT DIFFICULTY
GETTING_INTO_AND_OUT_OF_A_BATHTUB: UNABLE TO DO
HEAVY_WORK: UNABLE TO DO
WALKING_INITIALLY: MODERATE DIFFICULTY
STANDING_FOR_15_MINUTES: MODERATE DIFFICULTY
ROLLING_OVER_IN_BED: UNABLE TO DO
STEPPING_UP_AND_DOWN_CURBS: MODERATE DIFFICULTY
HOS_ADL_HIGHEST_POTENTIAL_SCORE: 60
HOS_ADL_COUNT: 15
HOS_ADL_ITEM_SCORE_TOTAL: 14
RECREATIONAL_ACTIVITIES: UNABLE TO DO
DEEP_SQUATTING: UNABLE TO DO
GOING_DOWN_1_FLIGHT_OF_STAIRS: MODERATE DIFFICULTY
LIGHT_TO_MODERATE_WORK: MODERATE DIFFICULTY
GETTING_INTO_AND_OUT_OF_AN_AVERAGE_CAR: MODERATE DIFFICULTY
HOW_WOULD_YOU_RATE_YOUR_CURRENT_LEVEL_OF_FUNCTION_DURING_YOUR_USUAL_ACTIVITIES_OF_DAILY_LIVING_FROM_0_TO_100_WITH_100_BEING_YOUR_LEVEL_OF_FUNCTION_PRIOR_TO_YOUR_HIP_PROBLEM_AND_0_BEING_THE_INABILITY_TO_PERFORM_ANY_OF_YOUR_USUAL_DAILY_ACTIVITIES?: 40
HOS_ADL_SCORE(%): 23.33
PUTTING_ON_SOCKS_AND_SHOES: EXTREME DIFFICULTY
TWISTING/PIVOTING_ON_INVOLVED_LEG: UNABLE TO DO
GOING_UP_1_FLIGHT_OF_STAIRS: MODERATE DIFFICULTY
WALKING_15_MINUTES_OR_GREATER: UNABLE TO DO

## 2022-05-11 NOTE — PROGRESS NOTES
Physical Therapy Initial Evaluation  Subjective:  The history is provided by the patient, the spouse and medical records. The history is limited by a language barrier. No  was used.   Patient Health History  Milvia Carrasquillo being seen for S/P L GOVIND.     Problem began: 5/4/2022.      Pain is reported as 4/10 (4/10 current; up to 8/10 over past couple days) on pain scale.  General health as reported by patient is good.  Pertinent medical history includes: osteoarthritis.     Medical allergies: none.   Surgeries include:  Orthopedic surgery. Other surgery history details: S/P L GOVIND 5/4/22.    Current medications:  Pain medication and anti-inflammatory.    Current occupation is ; Lives at home w/ , and 1 daughter at home (2 daughters total 18 and 22 yo).   Primary job tasks include:  Computer work and prolonged sitting.                  Therapist Generated HPI Evaluation         Type of problem:  Left hip.    This is a new condition.  Condition occurred with:  Degenerative joint disease.  Where condition occurred: for unknown reasons.  Patient reports pain:  Lateral (ache in incision region).  Pain is described as cramping (ache around incision; cramping in L lower leg) and is intermittent.  Pain radiates to:  Lower leg. Pain is the same all the time.  Since onset symptoms are gradually worsening.  Associated symptoms:  Loss of motion/stiffness, buckling/giving out, loss of strength and numbness (numbness in L heel (also present before surgery)). Symptoms are exacerbated by ascending stairs, descending stairs, transfers, walking and standing (avoiding lying on L side when sleeping; avoiding squatting; ascending/descending stairs one step at a time)  and relieved by analgesics, rest and ice.  Special tests included:  X-ray.  Previous treatment includes surgery. There was mild (only 1 week post op) improvement following previous treatment.  Restrictions due to condition  include:  Currently not working due to present treatment.  Barriers include:  Stairs (had to add step to get into med).                        Objective:    Gait:  Noted hip IR L vs R  Gait Type:  Antalgic   Assistive Devices:  Walker  Deviations:  Lumbar:  Trunk flexion                                                 Hip Evaluation  HIP AROM:    Flexion: Left: 70 w/o pain increase (not tested past 90)    Right:  110    Extension: Left: lacking 10    Right:  0  Abduction: Left:  15 (on sliding board)    Right:  40              Hip PROM:    Flexion: Left: 90 w/ empty end feel   Right:                        Hip Strength:  : L Hip strength: Flex unable to perform active SLR, able to perform hip flex L from hooklying (w/ knee flexed)    Flexion:   Right: 5/5   Pain:                                                 Knee Evaluation:  ROM:            Strength:         Quad Set Left:  Fair    Pain: +   Quad Set Right: Good    Pain:                  General     ROS    Assessment/Plan:    Patient is a 51 year old female with left side hip complaints.    Patient has the following significant findings with corresponding treatment plan.                Diagnosis 1:  S/P L GOVIND  Pain -  hot/cold therapy, manual therapy, splint/taping/bracing/orthotics, self management, education, directional preference exercise and home program  Decreased ROM/flexibility - manual therapy, therapeutic exercise, therapeutic activity and home program  Decreased strength - therapeutic exercise, therapeutic activities and home program  Impaired gait - gait training and home program  Impaired muscle performance - neuro re-education and home program  Decreased function - therapeutic activities and home program  Impaired posture - neuro re-education, therapeutic activities and home program    Therapy Evaluation Codes:   1) History comprised of:   Personal factors that impact the plan of care:      Age, Past/current experiences and Profession.     Comorbidity factors that impact the plan of care are:      Osteoarthritis.     Medications impacting care: Pain.  2) Examination of Body Systems comprised of:   Body structures and functions that impact the plan of care:      Hip.   Activity limitations that impact the plan of care are:      Bathing, Bending, Cooking, Driving, Dressing, Jumping, Lifting, Sitting, Squatting/kneeling, Stairs, Standing, Walking, Working, Sleeping and Laying down.  3) Clinical presentation characteristics are:   Stable/Uncomplicated.  4) Decision-Making    Low complexity using standardized patient assessment instrument and/or measureable assessment of functional outcome.  Cumulative Therapy Evaluation is: Low complexity.    Previous and current functional limitations:  (See Goal Flow Sheet for this information)    Short term and Long term goals: (See Goal Flow Sheet for this information)     Communication ability:  Patient appears to be able to clearly communicate and understand verbal and written communication and follow directions correctly.  Patient speaks English as a Second language and has her  assist w/ interpretation intermittently (declines formal )  Treatment Explanation - The following has been discussed with the patient:   RX ordered/plan of care  Anticipated outcomes  Possible risks and side effects  This patient would benefit from PT intervention to resume normal activities.   Rehab potential is excellent.    Frequency:  2 X week, once daily  Duration:  for 4 weeks tapering to 1 X a week over 6 weeks  Discharge Plan:  Achieve all LTG.  Independent in home treatment program.  Reach maximal therapeutic benefit.    Please refer to the daily flowsheet for treatment today, total treatment time and time spent performing 1:1 timed codes.

## 2022-05-12 ENCOUNTER — MYC REFILL (OUTPATIENT)
Dept: INTERNAL MEDICINE | Facility: CLINIC | Age: 52
End: 2022-05-12
Payer: COMMERCIAL

## 2022-05-12 DIAGNOSIS — M16.12 OSTEOARTHRITIS OF LEFT HIP, UNSPECIFIED OSTEOARTHRITIS TYPE: ICD-10-CM

## 2022-05-12 DIAGNOSIS — Z46.4 ORTHODONTICS AFTERCARE: Primary | ICD-10-CM

## 2022-05-12 RX ORDER — ACETAMINOPHEN 325 MG/1
650 TABLET ORAL EVERY 4 HOURS PRN
Qty: 60 TABLET | Refills: 0 | Status: CANCELLED | OUTPATIENT
Start: 2022-05-12

## 2022-05-12 RX ORDER — OXYCODONE HYDROCHLORIDE 5 MG/1
5 TABLET ORAL EVERY 4 HOURS PRN
Qty: 40 TABLET | Refills: 0 | Status: CANCELLED | OUTPATIENT
Start: 2022-05-12

## 2022-05-12 RX ORDER — ACETAMINOPHEN 325 MG/1
650 TABLET ORAL EVERY 4 HOURS PRN
Qty: 60 TABLET | Refills: 0 | Status: SHIPPED | OUTPATIENT
Start: 2022-05-12 | End: 2022-05-27

## 2022-05-12 RX ORDER — OXYCODONE HYDROCHLORIDE 5 MG/1
5 TABLET ORAL EVERY 6 HOURS PRN
Qty: 12 TABLET | Refills: 0 | Status: SHIPPED | OUTPATIENT
Start: 2022-05-12 | End: 2022-05-16

## 2022-05-13 ENCOUNTER — THERAPY VISIT (OUTPATIENT)
Dept: PHYSICAL THERAPY | Facility: CLINIC | Age: 52
End: 2022-05-13
Payer: COMMERCIAL

## 2022-05-13 DIAGNOSIS — Z96.642 AFTERCARE FOLLOWING LEFT HIP JOINT REPLACEMENT SURGERY: Primary | ICD-10-CM

## 2022-05-13 DIAGNOSIS — M25.552 HIP PAIN, LEFT: ICD-10-CM

## 2022-05-13 DIAGNOSIS — R26.9 ABNORMAL GAIT: ICD-10-CM

## 2022-05-13 DIAGNOSIS — Z47.1 AFTERCARE FOLLOWING LEFT HIP JOINT REPLACEMENT SURGERY: Primary | ICD-10-CM

## 2022-05-13 PROCEDURE — 97112 NEUROMUSCULAR REEDUCATION: CPT | Mod: GP | Performed by: PHYSICAL THERAPY ASSISTANT

## 2022-05-13 PROCEDURE — 97110 THERAPEUTIC EXERCISES: CPT | Mod: GP | Performed by: PHYSICAL THERAPY ASSISTANT

## 2022-05-16 ENCOUNTER — MYC REFILL (OUTPATIENT)
Dept: ORTHOPEDICS | Facility: CLINIC | Age: 52
End: 2022-05-16
Payer: COMMERCIAL

## 2022-05-16 DIAGNOSIS — Z46.4 ORTHODONTICS AFTERCARE: ICD-10-CM

## 2022-05-17 ENCOUNTER — THERAPY VISIT (OUTPATIENT)
Dept: PHYSICAL THERAPY | Facility: CLINIC | Age: 52
End: 2022-05-17
Payer: COMMERCIAL

## 2022-05-17 ENCOUNTER — OFFICE VISIT (OUTPATIENT)
Dept: ORTHOPEDICS | Facility: CLINIC | Age: 52
End: 2022-05-17
Payer: COMMERCIAL

## 2022-05-17 DIAGNOSIS — Z96.642 S/P TOTAL LEFT HIP ARTHROPLASTY: ICD-10-CM

## 2022-05-17 DIAGNOSIS — R26.9 ABNORMAL GAIT: ICD-10-CM

## 2022-05-17 DIAGNOSIS — M16.12 PRIMARY OSTEOARTHRITIS OF LEFT HIP: Primary | ICD-10-CM

## 2022-05-17 DIAGNOSIS — Z96.642 AFTERCARE FOLLOWING LEFT HIP JOINT REPLACEMENT SURGERY: Primary | ICD-10-CM

## 2022-05-17 DIAGNOSIS — Z47.1 AFTERCARE FOLLOWING LEFT HIP JOINT REPLACEMENT SURGERY: Primary | ICD-10-CM

## 2022-05-17 DIAGNOSIS — M25.552 HIP PAIN, LEFT: ICD-10-CM

## 2022-05-17 PROCEDURE — 97530 THERAPEUTIC ACTIVITIES: CPT | Mod: GP | Performed by: PHYSICAL THERAPY ASSISTANT

## 2022-05-17 PROCEDURE — 97110 THERAPEUTIC EXERCISES: CPT | Mod: GP | Performed by: PHYSICAL THERAPY ASSISTANT

## 2022-05-17 PROCEDURE — 99024 POSTOP FOLLOW-UP VISIT: CPT | Performed by: PHYSICIAN ASSISTANT

## 2022-05-17 RX ORDER — OXYCODONE HYDROCHLORIDE 5 MG/1
5 TABLET ORAL EVERY 6 HOURS PRN
Qty: 12 TABLET | Refills: 0 | Status: SHIPPED | OUTPATIENT
Start: 2022-05-17 | End: 2022-05-20

## 2022-05-17 NOTE — LETTER
5/17/2022         RE: Milvia Carrasquillo  [address] [place] [address]        Dear Colleague,    Thank you for referring your patient, Milvia Carrasquillo, to the [facility] [place]. Please see a copy of my visit note below.    ASSESSMENT/PLAN:  Milvia Carrasquillo is a 51 year old who is status post left GOVIND with Dr. Genao on 5/17/22.    Basic wound cares were performed at today's visit including removal of tegaderm dressing. Incision was cleansed. Steri strips may remain in place and fall off on their own. She may shower and pat dry.  We spent time discussing future wound/incision cares. We reviewed recommendations for bathing and hygiene. She was instructed to keep the heel floating to avoid pressure as she is having some numbness isolated to the plantar heel without clear etiology. We discussed it may be related to her remaining in certain positions and resting her heel for prolonged periods of time.     The patient will see Dr. Genao at six weeks post op. Milvia has our clinic number and will call with any questions or concerns.    Birdie Gil PA-C  Orthopaedic Surgery    _________________________________    HISTORY OF PRESENT ILLNESS:  Milvia Carrasquillo is a 51 year old female who is approximately 2 weeks status post the above procedure.    Weight bearing status/devices: WBAT  Pain level and management: pain is 2/10, currently using oxycodone  Physical therapy & ROM: working with PT, making progress. No concerns.      The patient endorses swelling around the surgical incision but denies surrounding redness. The incision has been dry, without discharge or drainage. Milvia denies recent fevers and chills, as well as any other symptoms concerning for infection. She notes some numbness on plantar left heel but no where else in the extremity.     DVT prophylaxis: ASA 162mg daily x 4 weeks.   Patient denies calf pain or  tenderness.      MEDICATIONS:   Current Outpatient Rx   Medication Sig Dispense Refill     acetaminophen (TYLENOL) 325 MG tablet Take 2 tablets (650 mg) by mouth every 4 hours as needed for mild pain 60 tablet 0     acetaminophen (TYLENOL) 325 MG tablet Take 2 tablets (650 mg) by mouth every 4 hours as needed for other 60 tablet 0     amLODIPine (NORVASC) 10 MG tablet Take 1 tablet (10 mg) by mouth in the morning. 90 tablet 3     aspirin (ASA) 81 MG EC tablet Take 2 tablets (162 mg) by mouth daily 60 tablet 0     losartan (COZAAR) 100 MG tablet Take 1 tablet (100 mg) by mouth in the morning. 90 tablet 3     oxyCODONE (ROXICODONE) 5 MG tablet Take 1 tablet (5 mg) by mouth every 6 hours as needed for pain 12 tablet 0     oxyCODONE (ROXICODONE) 5 MG tablet Take 1 tablet (5 mg) by mouth every 4 hours as needed 40 tablet 0     polyethylene glycol (MIRALAX) 17 g packet Take 17 g by mouth daily (Patient not taking: Reported on 5/17/2022) 10 packet 0     senna-docusate (SENOKOT-S/PERICOLACE) 8.6-50 MG tablet Take 1 tablet by mouth 2 times daily (Patient not taking: Reported on 5/17/2022) 30 tablet 0         ALLERGIES: Patient has no known allergies.       PHYSICAL EXAMINATION:   On physical examination the patient is comfortable and is in no acute distress. The affect is appropriate and breathing is non-labored.    Surgical wound: The surgical wound was exposed and found to be clean and dry, without drainage or discharge. There is mild edema around the incision. There is no erythema. The skin was appropriately warm to touch. Steri strips in tact. Incision cleansed with chloroprep.     ROM: hip ROM supple  Isometric activation of the quadriceps: in tact  SLR: in tact  Gait, ambulates with antalgic gait but able to put full weight.     Motor intact distally throughout the tibial and peroneal nerve distributions, 5/5 strength with tibialis anterior, gastrocnemius and soleus, EHL, FHL firing  Sensation intact to light touch  throughout superficial peroneal, deep peroneal, tibial, saphenous, and sural nerves  Dorsalis pedis and posterior tibial pulses palpable, toes warm and well-perfused

## 2022-05-17 NOTE — PROGRESS NOTES
ASSESSMENT/PLAN:  Milvia Carrasquillo is a 51 year old who is status post left GOVIND with Dr. Genao on 5/17/22.    Basic wound cares were performed at today's visit including removal of tegaderm dressing. Incision was cleansed. Steri strips may remain in place and fall off on their own. She may shower and pat dry.  We spent time discussing future wound/incision cares. We reviewed recommendations for bathing and hygiene. She was instructed to keep the heel floating to avoid pressure as she is having some numbness isolated to the plantar heel without clear etiology. We discussed it may be related to her remaining in certain positions and resting her heel for prolonged periods of time.     The patient will see Dr. Genao at six weeks post op. Milvia has our clinic number and will call with any questions or concerns.    Birdie Gil PA-C  Orthopaedic Surgery    _________________________________    HISTORY OF PRESENT ILLNESS:  Milvia Carrasquillo is a 51 year old female who is approximately 2 weeks status post the above procedure.    Weight bearing status/devices: WBAT  Pain level and management: pain is 2/10, currently using oxycodone  Physical therapy & ROM: working with PT, making progress. No concerns.      The patient endorses swelling around the surgical incision but denies surrounding redness. The incision has been dry, without discharge or drainage. Milvia denies recent fevers and chills, as well as any other symptoms concerning for infection. She notes some numbness on plantar left heel but no where else in the extremity.     DVT prophylaxis: ASA 162mg daily x 4 weeks.   Patient denies calf pain or tenderness.      MEDICATIONS:   Current Outpatient Rx   Medication Sig Dispense Refill     acetaminophen (TYLENOL) 325 MG tablet Take 2 tablets (650 mg) by mouth every 4 hours as needed for mild pain 60 tablet 0     acetaminophen (TYLENOL) 325 MG tablet Take 2 tablets (650 mg) by mouth every 4 hours as  needed for other 60 tablet 0     amLODIPine (NORVASC) 10 MG tablet Take 1 tablet (10 mg) by mouth in the morning. 90 tablet 3     aspirin (ASA) 81 MG EC tablet Take 2 tablets (162 mg) by mouth daily 60 tablet 0     losartan (COZAAR) 100 MG tablet Take 1 tablet (100 mg) by mouth in the morning. 90 tablet 3     oxyCODONE (ROXICODONE) 5 MG tablet Take 1 tablet (5 mg) by mouth every 6 hours as needed for pain 12 tablet 0     oxyCODONE (ROXICODONE) 5 MG tablet Take 1 tablet (5 mg) by mouth every 4 hours as needed 40 tablet 0     polyethylene glycol (MIRALAX) 17 g packet Take 17 g by mouth daily (Patient not taking: Reported on 5/17/2022) 10 packet 0     senna-docusate (SENOKOT-S/PERICOLACE) 8.6-50 MG tablet Take 1 tablet by mouth 2 times daily (Patient not taking: Reported on 5/17/2022) 30 tablet 0         ALLERGIES: Patient has no known allergies.       PHYSICAL EXAMINATION:   On physical examination the patient is comfortable and is in no acute distress. The affect is appropriate and breathing is non-labored.    Surgical wound: The surgical wound was exposed and found to be clean and dry, without drainage or discharge. There is mild edema around the incision. There is no erythema. The skin was appropriately warm to touch. Steri strips in tact. Incision cleansed with chloroprep.     ROM: hip ROM supple  Isometric activation of the quadriceps: in tact  SLR: in tact  Gait, ambulates with antalgic gait but able to put full weight.     Motor intact distally throughout the tibial and peroneal nerve distributions, 5/5 strength with tibialis anterior, gastrocnemius and soleus, EHL, FHL firing  Sensation intact to light touch throughout superficial peroneal, deep peroneal, tibial, saphenous, and sural nerves  Dorsalis pedis and posterior tibial pulses palpable, toes warm and well-perfused

## 2022-05-17 NOTE — NURSING NOTE
Reason For Visit:   Chief Complaint   Patient presents with     RECHECK     2 week POP Left GOVIND // DOS 5/4/2022 with Dr. Genao discuss refill on Oxycodone       There were no vitals taken for this visit.    Pain Assessment  Patient Currently in Pain: Yes  0-10 Pain Scale: 2  Primary Pain Location: Hip (Left)  Pain Descriptors: Other (comment) (pain on the incision, numb on bottom of foot)  Alleviating Factors: Rest, Pain medication  Aggravating Factors: Movement    Debo Bass ATC

## 2022-05-20 ENCOUNTER — THERAPY VISIT (OUTPATIENT)
Dept: PHYSICAL THERAPY | Facility: CLINIC | Age: 52
End: 2022-05-20
Payer: COMMERCIAL

## 2022-05-20 ENCOUNTER — MYC REFILL (OUTPATIENT)
Dept: ORTHOPEDICS | Facility: CLINIC | Age: 52
End: 2022-05-20

## 2022-05-20 DIAGNOSIS — Z47.1 AFTERCARE FOLLOWING LEFT HIP JOINT REPLACEMENT SURGERY: Primary | ICD-10-CM

## 2022-05-20 DIAGNOSIS — Z46.4 ORTHODONTICS AFTERCARE: ICD-10-CM

## 2022-05-20 DIAGNOSIS — M25.552 HIP PAIN, LEFT: ICD-10-CM

## 2022-05-20 DIAGNOSIS — Z96.642 AFTERCARE FOLLOWING LEFT HIP JOINT REPLACEMENT SURGERY: Primary | ICD-10-CM

## 2022-05-20 DIAGNOSIS — R26.9 ABNORMAL GAIT: ICD-10-CM

## 2022-05-20 PROCEDURE — 97110 THERAPEUTIC EXERCISES: CPT | Mod: GP | Performed by: PHYSICAL THERAPIST

## 2022-05-20 PROCEDURE — 97530 THERAPEUTIC ACTIVITIES: CPT | Mod: GP | Performed by: PHYSICAL THERAPIST

## 2022-05-20 RX ORDER — OXYCODONE HYDROCHLORIDE 5 MG/1
5 TABLET ORAL EVERY 6 HOURS PRN
Qty: 12 TABLET | Refills: 0 | Status: SHIPPED | OUTPATIENT
Start: 2022-05-20 | End: 2022-05-27

## 2022-05-20 NOTE — TELEPHONE ENCOUNTER
Prescription refill requested for:   oxyCODONE (ROXICODONE) 5 MG  Last Written Prescription Date:  5/17/22  Last Fill Quantity: 12,   # refills: 0  Last Office Visit: had surgery 5/4/22  Future Office visit:   6/16/22        Carl Saunders, EMT

## 2022-05-27 ENCOUNTER — VIRTUAL VISIT (OUTPATIENT)
Dept: SLEEP MEDICINE | Facility: CLINIC | Age: 52
End: 2022-05-27
Attending: INTERNAL MEDICINE
Payer: COMMERCIAL

## 2022-05-27 ENCOUNTER — MYC REFILL (OUTPATIENT)
Dept: ORTHOPEDICS | Facility: CLINIC | Age: 52
End: 2022-05-27

## 2022-05-27 VITALS — WEIGHT: 202 LBS | BODY MASS INDEX: 33.66 KG/M2 | HEIGHT: 65 IN

## 2022-05-27 DIAGNOSIS — I10 HYPERTENSION, UNSPECIFIED TYPE: ICD-10-CM

## 2022-05-27 DIAGNOSIS — Z46.4 ORTHODONTICS AFTERCARE: ICD-10-CM

## 2022-05-27 DIAGNOSIS — R06.83 SNORING: Primary | ICD-10-CM

## 2022-05-27 DIAGNOSIS — E66.811 CLASS 1 OBESITY DUE TO EXCESS CALORIES WITH SERIOUS COMORBIDITY AND BODY MASS INDEX (BMI) OF 34.0 TO 34.9 IN ADULT: ICD-10-CM

## 2022-05-27 DIAGNOSIS — E66.09 CLASS 1 OBESITY DUE TO EXCESS CALORIES WITH SERIOUS COMORBIDITY AND BODY MASS INDEX (BMI) OF 34.0 TO 34.9 IN ADULT: ICD-10-CM

## 2022-05-27 PROBLEM — R73.03 PREDIABETES: Status: ACTIVE | Noted: 2018-05-17

## 2022-05-27 PROCEDURE — 99204 OFFICE O/P NEW MOD 45 MIN: CPT | Mod: 95 | Performed by: INTERNAL MEDICINE

## 2022-05-27 RX ORDER — ZOLPIDEM TARTRATE 5 MG/1
TABLET ORAL
Qty: 1 TABLET | Refills: 0 | Status: SHIPPED | OUTPATIENT
Start: 2022-05-27 | End: 2022-08-22

## 2022-05-27 ASSESSMENT — SLEEP AND FATIGUE QUESTIONNAIRES
HOW LIKELY ARE YOU TO NOD OFF OR FALL ASLEEP WHILE LYING DOWN TO REST IN THE AFTERNOON WHEN CIRCUMSTANCES PERMIT: HIGH CHANCE OF DOZING
HOW LIKELY ARE YOU TO NOD OFF OR FALL ASLEEP WHILE SITTING INACTIVE IN A PUBLIC PLACE: WOULD NEVER DOZE
HOW LIKELY ARE YOU TO NOD OFF OR FALL ASLEEP WHILE WATCHING TV: MODERATE CHANCE OF DOZING
HOW LIKELY ARE YOU TO NOD OFF OR FALL ASLEEP WHILE SITTING AND TALKING TO SOMEONE: WOULD NEVER DOZE
HOW LIKELY ARE YOU TO NOD OFF OR FALL ASLEEP WHEN YOU ARE A PASSENGER IN A CAR FOR AN HOUR WITHOUT A BREAK: MODERATE CHANCE OF DOZING
HOW LIKELY ARE YOU TO NOD OFF OR FALL ASLEEP WHILE SITTING AND READING: WOULD NEVER DOZE
HOW LIKELY ARE YOU TO NOD OFF OR FALL ASLEEP IN A CAR, WHILE STOPPED FOR A FEW MINUTES IN TRAFFIC: WOULD NEVER DOZE
HOW LIKELY ARE YOU TO NOD OFF OR FALL ASLEEP WHILE SITTING QUIETLY AFTER LUNCH WITHOUT ALCOHOL: WOULD NEVER DOZE

## 2022-05-27 NOTE — PATIENT INSTRUCTIONS
"      MY TREATMENT INFORMATION FOR SLEEP APNEA-  Milvia Vergara [place]    https://medlineplus.gov/[place]/sleepapnea.html    https://www.healthinfotranslations.org/pdfDocs/CPAP_SP.pdf    DOCTOR : Henny Nguyễn MD    Am I having a sleep study at a sleep center? Ordered   --->Due to normal delays, you will be contacted within 2-4 weeks to schedule    Am I having a home sleep study?  --->Watch the video for the device you are using:    -/drop off device-   https://www.for; to (do).com/watch?v=yGGFBdELGhk    -Disposable device sent out require phone/computer application-   https://www.SportID[employer].com/watch?v=BCce_vbiwxE      Frequently asked questions:  1. What is Obstructive Sleep Apnea (SUSANNA)? SUSANNA is the most common type of sleep apnea. Apnea means, \"without breath.\"  Apnea is most often caused by narrowing or collapse of the upper airway as muscles relax during sleep.   Almost everyone has occasional apneas. Most people with sleep apnea have had brief interruptions at night frequently for many years.  The severity of sleep apnea is related to how frequent and severe the events are.   2. What are the consequences of SUSANNA? Symptoms include: feeling sleepy during the day, snoring loudly, gasping or stopping of breathing, trouble sleeping, and occasionally morning headaches or heartburn at night.  Sleepiness can be serious and even increase the risk of falling asleep while driving. Other health consequences may include development of high blood pressure and other cardiovascular disease in persons who are susceptible. Untreated SUSANNA  can contribute to heart disease, stroke and diabetes.   3. What are the treatment options? In most situations, sleep apnea is a lifelong disease that must be managed with daily therapy. Medications are not effective for sleep apnea and surgery is generally not considered until other therapies have been tried. Your treatment is your choice . Continuous Positive Airway (CPAP) works " right away and is the therapy that is effective in nearly everyone. An oral device to hold your jaw forward is usually the next most reliable option. Other options include postioning devices (to keep you off your back), weight loss, and surgery including a tongue pacing device. There is more detail about some of these options below.  4. Are my sleep studies covered by insurance? Although we will request verification of coverage, we advise you also check in advance of the study to ensure there is coverage.    Important tips for those choosing CPAP and similar devices   Know your equipment:  CPAP is continuous positive airway pressure that prevents obstructive sleep apnea by keeping the throat from collapsing while you are sleeping. In most cases, the device is  smart  and can slowly self-adjusts if your throat collapses and keeps a record every day of how well you are treated-this information is available to you and your care team.  BPAP is bilevel positive airway pressure that keeps your throat open and also assists each breath with a pressure boost to maintain adequate breathing.  Special kinds of BPAP are used in patients who have inadequate breathing from lung or heart disease. In most cases, the device is  smart  and can slowly self-adjusts to assist breathing. Like CPAP, the device keeps a record of how well you are treated.  Your mask is your connection to the device. You get to choose what feels most comfortable and the staff will help to make sure if fits. Here: are some examples of the different masks that are available:       Key points to remember on your journey with sleep apnea:  Sleep study.  PAP devices often need to be adjusted during a sleep study to show that they are effective and adjusted right.  Good tips to remember: Try wearing just the mask during a quiet time during the day so your body adapts to wearing it. A humidifier is recommended for comfort in most cases to prevent drying of your nose  and throat. Allergy medication from your provider may help you if you are having nasal congestion.  Getting settled-in. It takes more than one night for most of us to get used to wearing a mask. Try wearing just the mask during a quiet time during the day so your body adapts to wearing it. A humidifier is recommended for comfort in most cases. Our team will work with you carefully on the first day and will be in contact within 4 days and again at 2 and 4 weeks for advice and remote device adjustments. Your therapy is evaluated by the device each day.   Use it every night. The more you are able to sleep naturally for 7-8 hours, the more likely you will have good sleep and to prevent health risks or symptoms from sleep apnea. Even if you use it 4 hours it helps. Occasionally all of us are unable to use a medical therapy, in sleep apnea, it is not dangerous to miss one night.   Communicate. Call our skilled team on the number provided on the first day if your visit for problems that make it difficult to wear the device. Over 2 out of 3 patients can learn to wear the device long-term with help from our team. Remember to call our team or your sleep providers if you are unable to wear the device as we may have other solutions for those who cannot adapt to mask CPAP therapy. It is recommended that you sleep your sleep provider within the first 3 months and yearly after that if you are not having problems.   Use it for your health. We encourage use of CPAP masks during daytime quiet periods to allow your face and brain to adapt to the sensation of CPAP so that it will be a more natural sensation to awaken to at night or during naps. This can be very useful during the first few weeks or months of adapting to CPAP though it does not help medically to wear CPAP during wakefulness and  should not be used as a strategy just to meet guidelines.  Take care of your equipment. Make sure you clean your mask and tubing using  directions every day and that your filter and mask are replaced as recommended or if they are not working.     BESIDES CPAP, WHAT OTHER THERAPIES ARE THERE?    Positioning Device  Positioning devices are generally used when sleep apnea is mild and only occurs on your back.This example shows a pillow that straps around the waist. It may be appropriate for those whose sleep study shows milder sleep apnea that occurs primarily when lying flat on one's back. Preliminary studies have shown benefit but effectiveness at home may need to be verified by a home sleep test. These devices are generally not covered by medical insurance.  Examples of devices that maintain sleeping on the back to prevent snoring and mild sleep apnea.    Belt type body positioner  http://Light Harmonic[employer]/    Electronic reminder  http://nightshifttherapy.com/  http://www.Second Sight.[employer].au/      Oral Appliance  What is oral appliance therapy?  An oral appliance device fits on your teeth at night like a retainer used after having braces. The device is made by a specialized dentist and requires several visits over 1-2 months before a manufactured device is made to fit your teeth and is adjusted to prevent your sleep apnea. Once an oral device is working properly, snoring should be improved. A home sleep test may be recommended at that time if to determine whether the sleep apnea is adequately treated.       Some things to remember:  -Oral devices are often, but not always, covered by your medical insurance. Be sure to check with your insurance provider.   -If you are referred for oral therapy, you will be given a list of specialized dentists to consider or you may choose to visit the Web site of the American Academy of Dental Sleep Medicine  -Oral devices are less likely to work if you have severe sleep apnea or are extremely overweight.     More detailed information  An oral appliance is a small acrylic device that fits over the upper and lower teeth   (similar to a retainer or a mouth guard). This device slightly moves jaw forward, which moves the base of the tongue forward, opens the airway, improves breathing for effective treat snoring and obstructive sleep apnea in perhaps 7 out of 10 people .  The best working devices are custom-made by a dental device  after a mold is made of the teeth 1, 2, 3.  When is an oral appliance indicated?  Oral appliance therapy is recommended as a first-line treatment for patients with primary snoring, mild sleep apnea, and for patients with moderate sleep apnea who prefer appliance therapy to use of CPAP4, 5. Severity of sleep apnea is determined by sleep testing and is based on the number of respiratory events per hour of sleep.   How successful is oral appliance therapy?  The success rate of oral appliance therapy in patients with mild sleep apnea is 75-80% while in patients with moderate sleep apnea it is 50-70%. The chance of success in patients with severe sleep apnea is 40-50%. The research also shows that oral appliances have a beneficial effect on the cardiovascular health of SUSANNA patients at the same magnitude as CPAP therapy7.  Oral appliances should be a second-line treatment in cases of severe sleep apnea, but if not completely successful then a combination therapy utilizing CPAP plus oral appliance therapy may be effective. Oral appliances tend to be effective in a broad range of patients although studies show that the patients who have the highest success are females, younger patients, those with milder disease, and less severe obesity. 3, 6.   Finding a dentist that practices dental sleep medicine  Specific training is available through the American Academy of Dental Sleep Medicine for dentists interested in working in the field of sleep. To find a dentist who is educated in the field of sleep and the use of oral appliances, near you, visit the Web site of the American Academy of Dental Sleep  Medicine.    References  1. Rosalba et al. Objectively measured vs self-reported compliance during oral appliance therapy for sleep-disordered breathing. Chest 2013; 144(5): 9206-3685.  2. Derrick et al. Objective measurement of compliance during oral appliance therapy for sleep-disordered breathing. Thorax 2013; 68(1): 91-96.  3. Argelia et al. Mandibular advancement devices in 620 men and women with SUSANNA and snoring: tolerability and predictors of treatment success. Chest 2004; 125: 3015-1050.  4. Mary Beth et al. Oral appliances for snoring and SUSANNA: a review. Sleep 2006; 29: 244-262.  5. Katlyn et al. Oral appliance treatment for SUSANNA: an update. J Clin Sleep Med 2014; 10(2): 215-227.  6. January et al. Predictors of OSAH treatment outcome. J Dent Res 2007; 86: 4846-5420.      Weight Loss:    Weight loss is a long-term strategy that may improve sleep apnea in some patients.    Weight management is a personal decision and the decision should be based on your interest and the potential benefits.  If you are interested in exploring weight loss strategies, the following discussion covers the impact on weight loss on sleep apnea and the approaches that may be successful.    Being overweight does not necessarily mean you will have health consequences.  Those who have BMI over 35 or over 27 with existing medical conditions carries greater risk.   Weight loss decreases severity of sleep apnea in most people with obesity. For those with mild obesity who have developed snoring with weight gain, even 15-30 pound weight loss can improve and occasionally eliminate sleep apnea.  Structured and life-long dietary and health habits are necessary to lose weight and keep healthier weight levels.     Though there may be significant health benefits from weight loss, long-term weight loss is very difficult to achieve- studies show success with dietary management in less than 10% of people. In addition, substantial  weight loss may require years of dietary control and may be difficult if patients have severe obesity. In these cases, surgical management may be considered.  Finally, older individuals who have tolerated obesity without health complications may be less likely to benefit from weight loss strategies.      [unfilled]    Surgery:    Surgery for obstructive sleep apnea is considered generally only when other therapies fail to work. Surgery may be discussed with you if you are having a difficult time tolerating CPAP and or when there is an abnormal structure that requires surgical correction.  Nose and throat surgeries often enlarge the airway to prevent collapse.  Most of these surgeries create pain for 1-2 weeks and up to half of the most common surgeries are not effective throughout life.  You should carefully discuss the benefits and drawbacks to surgery with your sleep provider and surgeon to determine if it is the best solution for you.   More information  Surgery for SUSANNA is directed at areas that are responsible for narrowing or complete obstruction of the airway during sleep.  There are a wide range of procedures available to enlarge and/or stabilize the airway to prevent blockage of breathing in the three major areas where it can occur: the palate, tongue, and nasal regions.  Successful surgical treatment depends on the accurate identification of the factors responsible for obstructive sleep apnea in each person.  A personalized approach is required because there is no single treatment that works well for everyone.  Because of anatomic variation, consultation with an examination by a sleep surgeon is a critical first step in determining what surgical options are best for each patient.  In some cases, examination during sedation may be recommended in order to guide the selection of procedures.  Patients will be counseled about risks and benefits as well as the typical recovery course after surgery. Surgery is  typically not a cure for a person s SUSANNA.  However, surgery will often significantly improve one s SUSANNA severity (termed  success rate ).  Even in the absence of a cure, surgery will decrease the cardiovascular risk associated with OSA7; improve overall quality of life8 (sleepiness, functionality, sleep quality, etc).      Palate Procedures:  Patients with SUSANNA often have narrowing of their airway in the region of their tonsils and uvula.  The goals of palate procedures are to widen the airway in this region as well as to help the tissues resist collapse.  Modern palate procedure techniques focus on tissue conservation and soft tissue rearrangement, rather than tissue removal.  Often the uvula is preserved in this procedure. Residual sleep apnea is common in patient after pharyngoplasty with an average reduction in sleep apnea events of 33%2.      Tongue Procedures:  ExamWhile patients are awake, the muscles that surround the throat are active and keep this region open for breathing. These muscles relax during sleep, allowing the tongue and other structures to collapse and block breathing.  There are several different tongue procedures available.  Selection of a tongue base procedure depends on characteristics seen on physical exam.  Generally, procedures are aimed at removing bulky tissues in this area or preventing the back of the tongue from falling back during sleep.  Success rates for tongue surgery range from 50-62%3.    Hypoglossal Nerve Stimulation:  Hypoglossal nerve stimulation has recently received approval from the [place] Food and Drug Administration for the treatment of obstructive sleep apnea.  This is based on research showing that the system was safe and effective in treating sleep apnea6.  Results showed that the median AHI score decreased 68%, from 29.3 to 9.0. This therapy uses an implant system that senses breathing patterns and delivers mild stimulation to airway muscles, which keeps the  airway open during sleep.  The system consists of three fully implanted components: a small generator (similar in size to a pacemaker), a breathing sensor, and a stimulation lead.  Using a small handheld remote, a patient turns the therapy on before bed and off upon awakening.    Candidates for this device must be greater than 22 years of age, have moderate to severe SUSANNA (AHI between 20-65), BMI less than 32, have tried CPAP/oral appliance without success, and have appropriate upper airway anatomy (determined by a sleep endoscopy performed by Dr. Benavidez).    Hypoglossal Nerve Stimulation Pathway:    The sleep surgeon s office will work with the patient through the insurance prior-authorization process (including communications and appeals).    Nasal Procedures:  Nasal obstruction can interfere with nasal breathing during the day and night.  Studies have shown that relief of nasal obstruction can improve the ability of some patients to tolerate positive airway pressure therapy for obstructive sleep apnea1.  Treatment options include medications such as nasal saline, topical corticosteroid and antihistamine sprays, and oral medications such as antihistamines or decongestants. Non-surgical treatments can include external nasal dilators for selected patients. If these are not successful by themselves, surgery can improve the nasal airway either alone or in combination with these other options.      Combination Procedures:  Combination of surgical procedures and other treatments may be recommended, particularly if patients have more than one area of narrowing or persistent positional disease.  The success rate of combination surgery ranges from 66-80%2,3.    References  Penny IRELAND. The Role of the Nose in Snoring and Obstructive Sleep Apnoea: An Update.  Eur Arch Otorhinolaryngol. 2011; 268: 1365-73.   Benny SM; Jc VALVERDE; Anita JR; Pallanch JF; Sarah ANDRADE; Santiago ADAMS; Humphrey LAWRENCE. Surgical modifications of the upper  airway for obstructive sleep apnea in adults: a systematic review and meta-analysis. SLEEP 2010;33(10):6539-0092. Rolando QUIROS. Hypopharyngeal surgery in obstructive sleep apnea: an evidence-based medicine review.  Arch Otolaryngol Head Neck Surg. 2006 Feb;132(2):206-13.  Aurelio YH1, Shira Y, Elio MAURICIO. The efficacy of anatomically based multilevel surgery for obstructive sleep apnea. Otolaryngol Head Neck Surg. 2003 Oct;129(4):327-35.  Kezirian E, Goldberg A. Hypopharyngeal Surgery in Obstructive Sleep Apnea: An Evidence-Based Medicine Review. Arch Otolaryngol Head Neck Surg. 2006 Feb;132(2):206-13.  Jazmin PAULINO et al. Upper-Airway Stimulation for Obstructive Sleep Apnea.  N Engl J Med. 2014 Jan 9;370(2):139-49.  Abilio Y et al. Increased Incidence of Cardiovascular Disease in Middle-aged Men with Obstructive Sleep Apnea. Am J Respir Crit Care Med; 2002 166: 159-165  Peguero EM et al. Studying Life Effects and Effectiveness of Palatopharyngoplasty (SLEEP) study: Subjective Outcomes of Isolated Uvulopalatopharyngoplasty. Otolaryngol Head Neck Surg. 2011; 144: 623-631.        WHAT IF I ONLY HAVE SNORING?    Mandibular advancement devices, lateral sleep positioning, long-term weight loss and treatment of nasal allergies have been shown to improve snoring.  Exercising tongue muscles with a game (https://www.ncbi.nlm.nih.gov/pubmed/04726410) or stimulating the tongue during the day with a device (https://doi.org/10.3390/lxv87929036) have improved snoring in some individuals.    Remember to Drive Safe... Drive Alive     Sleep health profoundly affects your health, mood, and your safety.  Thirty three percent of the population (one in three of us) is not getting enough sleep and many have a sleep disorder. Not getting enough sleep or having an untreated / undertreated sleep condition may make us sleepy without even knowing it. In fact, our driving could be dramatically impaired due to our sleep health. As your provider, here are  some things I would like you to know about driving:     Here are some warning signs for impairment and dangerous drowsy driving:              -Having been awake more than 16 hours               -Looking tired               -Eyelid drooping              -Head nodding (it could be too late at this point)              -Driving for more than 30 minutes     Some things you could do to make the driving safer if you are experiencing some drowsiness:              -Stop driving and rest              -Call for transportation              -Make sure your sleep disorder is adequately treated     Some things that have been shown NOT to work when experiencing drowsiness while driving:              -Turning on the radio              -Opening windows              -Eating any  distracting  /  entertaining  foods (e.g., sunflower seeds, candy, or any other)              -Talking on the phone      Your decision may not only impact your life, but also the life of others. Please, remember to drive safe for yourself and all of us.

## 2022-05-27 NOTE — LETTER
5/27/2022         RE: Milvia Carrasquillo  [address] [facility] [address]        Dear Colleague,    Thank you for referring your patient, Milvia Carrasquillo, to the [facility] SLEEP [facility]. Please see a copy of my visit note below.    Milvia is a 51 year old who is being evaluated via a billable video visit.      How would you like to obtain your AVS? MyChart  If the video visit is dropped, the invitation should be resent by: Text to cell phone: [phone]   Will anyone else be joining your video visit? No Jordanian  on phone for visit      Video Start Time: 1:07 PM  Video-Visit Details    Type of service:  Video Visit    Video End Time:1:35 PM    Originating Location (pt. Location): Other work    Distant Location (provider location):  [facility]     Platform used for Video Visit: [employer]     Chief complaint: Consultation requested by Claudio Koehler MD for the evaluation of snoring in the setting of hypertension    History of Present Illness: 51-year-old female with recently diagnosed hypertension.  She was found to have loud snoring.  She denies waking up with shortness of breath, gasping or choking.  She is getting noted at night due to the loud snoring.  No reports of observed apneas.  She will occasionally wake up with dry mouth or neck pain.  No headaches.  She denies being excessively sleepy during the day.  She rarely takes naps.    Typical bedtime is around 1030 to 11 PM.  She does not require any sleep aids.  She usually is up by 6:30 in the morning.  She recently went through hip surgery no reports of concerns regarding anesthesia and apnea.  She is postmenopausal.  There is a family history of sleep apnea in her mother.    Patient denies motor restlessness, nightmares, sleepwalking, sleep talking or dream enactment behavior.    [place] Sleepiness Scale   Sitting and reading: Would never doze   Watching TV: Moderate  chance of dozing   Sitting, inactive in a public place (e.g. a theatre or a meeting): Would never doze   As a passenger in a car for an hour without a break: Moderate chance of dozing   Lying down to rest in the afternoon when circumstances permit: High chance of dozing   Sitting and talking to someone: Would never doze   Sitting quietly after a lunch without alcohol: Would never doze   In a car, while stopped for a few minutes in traffic: Would never doze   Total score - [place]: 7     (Less than 10 normal)    Insomnia Severity Scale  ANAI Total Score: 2  Total score categories:  0-7 = No clinically significant insomnia   8-14 = Subthreshold insomnia   15-21 = Clinical insomnia (moderate severity)  22-28 = Clinical insomnia (severe)    STOP-BANG  Loud Snore   1  Excessively Tired/Sleepy   0  Observed apnea   0  Hypertension   1  BMI> 35 kg/m2   0  Age >50   1  Neck >16 in/40cm   0  Male Gender   0  Total =   3  (0-2 low, 3-4 intermediate, 5-8 high risk of SUSANNA)      Past Medical History:   Diagnosis Date     HTN (hypertension)        No Known Allergies    Current Outpatient Medications   Medication     amLODIPine (NORVASC) 10 MG tablet     aspirin (ASA) 81 MG EC tablet     losartan (COZAAR) 100 MG tablet     acetaminophen (TYLENOL) 325 MG tablet     acetaminophen (TYLENOL) 325 MG tablet     No current facility-administered medications for this visit.       Social History     Socioeconomic History     Marital status:      Spouse name: Not on file     Number of children: Not on file     Years of education: Not on file     Highest education level: Not on file   Occupational History     Not on file   Tobacco Use     Smoking status: Never Smoker     Smokeless tobacco: Never Used   Substance and Sexual Activity     Alcohol use: Yes     Comment: rare     Drug use: Never     Sexual activity: Not on file   Other Topics Concern     Not on file   Social History Narrative     Not on file     Social Determinants of Health  "    Financial Resource Strain: Not on file   Food Insecurity: Not on file   Transportation Needs: Not on file   Physical Activity: Not on file   Stress: Not on file   Social Connections: Not on file   Intimate Partner Violence: Not on file   Housing Stability: Not on file       Family History   Problem Relation Age of Onset     Sleep Apnea Mother            EXAM:  Ht 1.638 m (5' 4.5\")   Wt 91.6 kg (202 lb)   BMI 34.14 kg/m    GENERAL: Healthy, alert and no distress  EYES: Eyes grossly normal to inspection.  No discharge or erythema, or obvious scleral/conjunctival abnormalities.  RESP: No audible wheeze, cough, or visible cyanosis.  No visible retractions or increased work of breathing.    SKIN: Visible skin clear. No significant rash, abnormal pigmentation or lesions.  NEURO: Cranial nerves grossly intact.  Mentation and speech appropriate for age.  PSYCH: Mentation appears normal, affect normal/bright, judgement and insight intact, normal speech and appearance well-groomed.       TSH   Date Value Ref Range Status   03/07/2022 2.32 0.40 - 4.00 mU/L Final         ASSESSMENT:  51-year-old female with history of hypertension, obesity, very loud snoring.  She is postmenopausal and has a family history of sleep apnea.  Overall she is at intermediate risk for obstructive sleep apnea.  Due to her comorbidities identifying and treating obstructive sleep apnea is medically indicated.    PLAN:  We reviewed the pathophysiology of obstructive sleep apnea.  We recommended testing.  Proceed with in lab testing for more accuracy to  milder disease.  I will provide a prescription for 1 tablet of a sleep aid to bring to the lab and take at lights out if needed.  We briefly reviewed treatment options including weight loss, CPAP and oral appliances.  We will review results of the sleep study with patient couple of weeks after the study is completed.  She is agreeable with this plan.      58 minutes spent on the date of the " encounter doing chart review, history and exam, documentation and further activities per the note    Henny Nguyễn M.D.  Pulmonary/Critical Care/Sleep Medicine    [facility]   Floor 1, [address]   [address] [place] [address]   Appointments: [phone]    The above note was dictated using voice recognition software and may include typographical errors. Please contact the author for any clarifications.                Again, thank you for allowing me to participate in the care of your patient.        Sincerely,        Henny Nguyễn MD

## 2022-05-27 NOTE — PROGRESS NOTES
Milvia is a 51 year old who is being evaluated via a billable video visit.      How would you like to obtain your AVS? MyChart  If the video visit is dropped, the invitation should be resent by: Text to cell phone: [phone]   Will anyone else be joining your video visit? No Greenlandic  on phone for visit      Video Start Time: 1:07 PM  Video-Visit Details    Type of service:  Video Visit    Video End Time:1:35 PM    Originating Location (pt. Location): Other work    Distant Location (provider location):  [facility]     Platform used for Video Visit: [employer]     Chief complaint: Consultation requested by Claudio Koehler MD for the evaluation of snoring in the setting of hypertension    History of Present Illness: 51-year-old female with recently diagnosed hypertension.  She was found to have loud snoring.  She denies waking up with shortness of breath, gasping or choking.  She is getting noted at night due to the loud snoring.  No reports of observed apneas.  She will occasionally wake up with dry mouth or neck pain.  No headaches.  She denies being excessively sleepy during the day.  She rarely takes naps.    Typical bedtime is around 1030 to 11 PM.  She does not require any sleep aids.  She usually is up by 6:30 in the morning.  She recently went through hip surgery no reports of concerns regarding anesthesia and apnea.  She is postmenopausal.  There is a family history of sleep apnea in her mother.    Patient denies motor restlessness, nightmares, sleepwalking, sleep talking or dream enactment behavior.    [place] Sleepiness Scale   Sitting and reading: Would never doze   Watching TV: Moderate chance of dozing   Sitting, inactive in a public place (e.g. a theatre or a meeting): Would never doze   As a passenger in a car for an hour without a break: Moderate chance of dozing   Lying down to rest in the afternoon when circumstances permit: High chance of dozing    Sitting and talking to someone: Would never doze   Sitting quietly after a lunch without alcohol: Would never doze   In a car, while stopped for a few minutes in traffic: Would never doze   Total score - [place]: 7     (Less than 10 normal)    Insomnia Severity Scale  ANAI Total Score: 2  Total score categories:  0-7 = No clinically significant insomnia   8-14 = Subthreshold insomnia   15-21 = Clinical insomnia (moderate severity)  22-28 = Clinical insomnia (severe)    STOP-BANG  Loud Snore   1  Excessively Tired/Sleepy   0  Observed apnea   0  Hypertension   1  BMI> 35 kg/m2   0  Age >50   1  Neck >16 in/40cm   0  Male Gender   0  Total =   3  (0-2 low, 3-4 intermediate, 5-8 high risk of SUSANNA)      Past Medical History:   Diagnosis Date     HTN (hypertension)        No Known Allergies    Current Outpatient Medications   Medication     amLODIPine (NORVASC) 10 MG tablet     aspirin (ASA) 81 MG EC tablet     losartan (COZAAR) 100 MG tablet     acetaminophen (TYLENOL) 325 MG tablet     acetaminophen (TYLENOL) 325 MG tablet     No current facility-administered medications for this visit.       Social History     Socioeconomic History     Marital status:      Spouse name: Not on file     Number of children: Not on file     Years of education: Not on file     Highest education level: Not on file   Occupational History     Not on file   Tobacco Use     Smoking status: Never Smoker     Smokeless tobacco: Never Used   Substance and Sexual Activity     Alcohol use: Yes     Comment: rare     Drug use: Never     Sexual activity: Not on file   Other Topics Concern     Not on file   Social History Narrative     Not on file     Social Determinants of Health     Financial Resource Strain: Not on file   Food Insecurity: Not on file   Transportation Needs: Not on file   Physical Activity: Not on file   Stress: Not on file   Social Connections: Not on file   Intimate Partner Violence: Not on file   Housing Stability: Not on file  "      Family History   Problem Relation Age of Onset     Sleep Apnea Mother            EXAM:  Ht 1.638 m (5' 4.5\")   Wt 91.6 kg (202 lb)   BMI 34.14 kg/m    GENERAL: Healthy, alert and no distress  EYES: Eyes grossly normal to inspection.  No discharge or erythema, or obvious scleral/conjunctival abnormalities.  RESP: No audible wheeze, cough, or visible cyanosis.  No visible retractions or increased work of breathing.    SKIN: Visible skin clear. No significant rash, abnormal pigmentation or lesions.  NEURO: Cranial nerves grossly intact.  Mentation and speech appropriate for age.  PSYCH: Mentation appears normal, affect normal/bright, judgement and insight intact, normal speech and appearance well-groomed.       TSH   Date Value Ref Range Status   03/07/2022 2.32 0.40 - 4.00 mU/L Final         ASSESSMENT:  51-year-old female with history of hypertension, obesity, very loud snoring.  She is postmenopausal and has a family history of sleep apnea.  Overall she is at intermediate risk for obstructive sleep apnea.  Due to her comorbidities identifying and treating obstructive sleep apnea is medically indicated.    PLAN:  We reviewed the pathophysiology of obstructive sleep apnea.  We recommended testing.  Proceed with in lab testing for more accuracy to  milder disease.  I will provide a prescription for 1 tablet of a sleep aid to bring to the lab and take at lights out if needed.  We briefly reviewed treatment options including weight loss, CPAP and oral appliances.  We will review results of the sleep study with patient couple of weeks after the study is completed.  She is agreeable with this plan.      58 minutes spent on the date of the encounter doing chart review, history and exam, documentation and further activities per the note    Henny Nguyễn M.D.  Pulmonary/Critical Care/Sleep Medicine    [facility]   Floor 1, [address]   [address] [place] [address]   Appointments: [phone]    The above note was dictated using voice recognition software and may include typographical errors. Please contact the author for any clarifications.

## 2022-05-31 RX ORDER — ACETAMINOPHEN 325 MG/1
650 TABLET ORAL EVERY 4 HOURS PRN
Qty: 60 TABLET | Refills: 0 | Status: SHIPPED | OUTPATIENT
Start: 2022-05-31 | End: 2022-08-22

## 2022-06-15 DIAGNOSIS — Z96.642 S/P TOTAL LEFT HIP ARTHROPLASTY: Primary | ICD-10-CM

## 2022-06-15 ASSESSMENT — HOOS JR
LYING IN BED (TURNING OVER, MAINTAINING HIP POSITION): MILD
GOING UP OR DOWN STAIRS: MILD
BENDING TO THE FLOOR TO PICK UP OBJECT: MODERATE
RISING FROM SITTING: MODERATE
HOOS JR TOTAL INTERVAL SCORE: 67.52
WALKING ON UNEVEN SURFACE: MILD

## 2022-06-16 ENCOUNTER — OFFICE VISIT (OUTPATIENT)
Dept: ORTHOPEDICS | Facility: CLINIC | Age: 52
End: 2022-06-16
Payer: COMMERCIAL

## 2022-06-16 ENCOUNTER — ANCILLARY PROCEDURE (OUTPATIENT)
Dept: GENERAL RADIOLOGY | Facility: CLINIC | Age: 52
End: 2022-06-16
Attending: ORTHOPAEDIC SURGERY
Payer: COMMERCIAL

## 2022-06-16 DIAGNOSIS — Z96.642 S/P TOTAL LEFT HIP ARTHROPLASTY: ICD-10-CM

## 2022-06-16 DIAGNOSIS — Z47.89 ORTHOPEDIC AFTERCARE: Primary | ICD-10-CM

## 2022-06-16 PROBLEM — E11.9 DIABETES MELLITUS, TYPE 2 (H): Status: ACTIVE | Noted: 2022-06-16

## 2022-06-16 PROCEDURE — 73502 X-RAY EXAM HIP UNI 2-3 VIEWS: CPT | Mod: LT | Performed by: RADIOLOGY

## 2022-06-16 PROCEDURE — 99024 POSTOP FOLLOW-UP VISIT: CPT | Mod: GC | Performed by: ORTHOPAEDIC SURGERY

## 2022-06-16 RX ORDER — LOSARTAN POTASSIUM 50 MG/1
50 TABLET ORAL DAILY
COMMUNITY
Start: 2022-04-12 | End: 2022-08-22

## 2022-06-16 NOTE — NURSING NOTE
Reason For Visit:   Chief Complaint   Patient presents with     RECHECK     6 weeks s/p Left GOVIND DOS 5/4/22. Doing well       There were no vitals taken for this visit.         Jeniffer Marshall, ATC

## 2022-06-16 NOTE — LETTER
6/16/2022         RE: Milvia Carrasquillo  [address] [place] [address]        Dear Colleague,    Thank you for referring your patient, Milvia Carrasquillo, to the [facility] ORTHOPEDIC CLINIC [place]. Please see a copy of my visit note below.    Orthopaedic Surgery Clinic Note    _________________________________    HISTORY OF PRESENT ILLNESS:  Milvia Carrasquillo is a 51 year old female who is approximately 6 weeks status post L GOVIND, feels that she is doing very well. Pain is much improved compared with prior to surgery. Still using a cane.     Weight bearing status/devices: WBAT  Pain level and management: pain is minimal, has not used tylenol for last 2 days  Physical therapy & ROM: working with PT, every other week.      No issues with incision healing. Denies recent fevers and chills, as well as any other symptoms concerning for infection. She notes some numbness on plantar left heel but no where else in the extremity, but reports that it was present prior to surgery.      Intermittent left calf cramps, but resolves each time    Jocelyne MIREYAOS 67.52      MEDICATIONS:   Current Outpatient Rx   Medication Sig Dispense Refill     acetaminophen (TYLENOL) 325 MG tablet Take 2 tablets (650 mg) by mouth every 4 hours as needed for mild pain 60 tablet 0     amLODIPine (NORVASC) 10 MG tablet Take 1 tablet (10 mg) by mouth in the morning. 90 tablet 3     losartan (COZAAR) 50 MG tablet Take 50 mg by mouth daily       zolpidem (AMBIEN) 5 MG tablet Take tablet by mouth 15 minutes prior to sleep, for Sleep Study 1 tablet 0     acetaminophen (TYLENOL) 325 MG tablet Take 2 tablets (650 mg) by mouth every 4 hours as needed for other (Patient not taking: No sig reported) 60 tablet 0     aspirin (ASA) 81 MG EC tablet Take 2 tablets (162 mg) by mouth daily (Patient not taking: Reported on 6/16/2022) 60 tablet 0     losartan (COZAAR) 100 MG tablet Take 1 tablet (100 mg) by mouth in the morning.  (Patient not taking: Reported on 6/16/2022) 90 tablet 3         ALLERGIES: Patient has no known allergies.       PHYSICAL EXAMINATION:   On physical examination the patient is comfortable and is in no acute distress. The affect is appropriate and breathing is non-labored.    Surgical wound: Incision is well healed without surrounding erythema, no drainage    ROM: hip ROM fluid without pain  Isometric activation of the quadriceps: in tact  SLR: in tact  Gait, ambulates with mildly antalgic gait     Motor intact distally throughout the tibial and peroneal nerve distributions, 5/5 strength with tibialis anterior, gastrocnemius and soleus, EHL, FHL firing  Decreased sensation over plantar heel, otherwise sensation intact to light touch throughout superficial peroneal, deep peroneal, tibial, saphenous, and sural nerves  Dorsalis pedis and posterior tibial pulses palpable, toes warm and well-perfused    Imaging:  AP pelvis and left hip XRs reviewed and demonstrated well seated implants without beatrice-implant lucencies or acute bony abnormalities. Acetabular bone graft incorporating well.     ASSESSMENT/PLAN:  Milvia Carrasquillo is a 51 year old who is 6 weeks status post left GOVIND with Dr. Genao on 5/17/22.    Reviewed imaging with patient and her . Patient is progressing well and as expected. Continue posterior hip precautions and working with PT.    Patient will follow up in clinic or virtually at 3 months postop.    Seen and discussed with Dr. Genao.     Yumiko Doran MD  PGY-4  Orthopaedic Surgery          I have personally examined this patient and have reviewed the clinical presentation and progress note with the resident. I agree with the treatment plan as outlined. The plan was formulated with the resident on the day of the resident's dictation.    Joe Genao MD

## 2022-06-16 NOTE — PROGRESS NOTES
Orthopaedic Surgery Clinic Note    _________________________________    HISTORY OF PRESENT ILLNESS:  Milvia Carrasquillo is a 51 year old female who is approximately 6 weeks status post L GOVIND, feels that she is doing very well. Pain is much improved compared with prior to surgery. Still using a cane.     Weight bearing status/devices: WBAT  Pain level and management: pain is minimal, has not used tylenol for last 2 days  Physical therapy & ROM: working with PT, every other week.      No issues with incision healing. Denies recent fevers and chills, as well as any other symptoms concerning for infection. She notes some numbness on plantar left heel but no where else in the extremity, but reports that it was present prior to surgery.      Intermittent left calf cramps, but resolves each time    Jr. REYNA 67.52      MEDICATIONS:   Current Outpatient Rx   Medication Sig Dispense Refill     acetaminophen (TYLENOL) 325 MG tablet Take 2 tablets (650 mg) by mouth every 4 hours as needed for mild pain 60 tablet 0     amLODIPine (NORVASC) 10 MG tablet Take 1 tablet (10 mg) by mouth in the morning. 90 tablet 3     losartan (COZAAR) 50 MG tablet Take 50 mg by mouth daily       zolpidem (AMBIEN) 5 MG tablet Take tablet by mouth 15 minutes prior to sleep, for Sleep Study 1 tablet 0     acetaminophen (TYLENOL) 325 MG tablet Take 2 tablets (650 mg) by mouth every 4 hours as needed for other (Patient not taking: No sig reported) 60 tablet 0     aspirin (ASA) 81 MG EC tablet Take 2 tablets (162 mg) by mouth daily (Patient not taking: Reported on 6/16/2022) 60 tablet 0     losartan (COZAAR) 100 MG tablet Take 1 tablet (100 mg) by mouth in the morning. (Patient not taking: Reported on 6/16/2022) 90 tablet 3         ALLERGIES: Patient has no known allergies.       PHYSICAL EXAMINATION:   On physical examination the patient is comfortable and is in no acute distress. The affect is appropriate and breathing is  non-labored.    Surgical wound: Incision is well healed without surrounding erythema, no drainage    ROM: hip ROM fluid without pain  Isometric activation of the quadriceps: in tact  SLR: in tact  Gait, ambulates with mildly antalgic gait     Motor intact distally throughout the tibial and peroneal nerve distributions, 5/5 strength with tibialis anterior, gastrocnemius and soleus, EHL, FHL firing  Decreased sensation over plantar heel, otherwise sensation intact to light touch throughout superficial peroneal, deep peroneal, tibial, saphenous, and sural nerves  Dorsalis pedis and posterior tibial pulses palpable, toes warm and well-perfused    Imaging:  AP pelvis and left hip XRs reviewed and demonstrated well seated implants without beatrice-implant lucencies or acute bony abnormalities. Acetabular bone graft incorporating well.     ASSESSMENT/PLAN:  Milvia Carrasquillo is a 51 year old who is 6 weeks status post left GOVIND with Dr. Genao on 5/17/22.    Reviewed imaging with patient and her . Patient is progressing well and as expected. Continue posterior hip precautions and working with PT.    Patient will follow up in clinic or virtually at 3 months postop.    Seen and discussed with Dr. Genao.     Yumiko Doran MD  PGY-4  Orthopaedic Surgery

## 2022-06-20 ENCOUNTER — THERAPY VISIT (OUTPATIENT)
Dept: PHYSICAL THERAPY | Facility: CLINIC | Age: 52
End: 2022-06-20
Payer: COMMERCIAL

## 2022-06-20 DIAGNOSIS — Z47.1 AFTERCARE FOLLOWING LEFT HIP JOINT REPLACEMENT SURGERY: Primary | ICD-10-CM

## 2022-06-20 DIAGNOSIS — R26.9 ABNORMAL GAIT: ICD-10-CM

## 2022-06-20 DIAGNOSIS — M25.552 HIP PAIN, LEFT: ICD-10-CM

## 2022-06-20 DIAGNOSIS — Z96.642 AFTERCARE FOLLOWING LEFT HIP JOINT REPLACEMENT SURGERY: Primary | ICD-10-CM

## 2022-06-20 PROCEDURE — 97110 THERAPEUTIC EXERCISES: CPT | Mod: GP | Performed by: PHYSICAL THERAPIST

## 2022-06-20 ASSESSMENT — ACTIVITIES OF DAILY LIVING (ADL)
GOING_UP_1_FLIGHT_OF_STAIRS: SLIGHT DIFFICULTY
LIGHT_TO_MODERATE_WORK: SLIGHT DIFFICULTY
GETTING_INTO_AND_OUT_OF_AN_AVERAGE_CAR: MODERATE DIFFICULTY
DEEP_SQUATTING: UNABLE TO DO
HOS_ADL_SCORE(%): 55
PUTTING_ON_SOCKS_AND_SHOES: EXTREME DIFFICULTY
WALKING_15_MINUTES_OR_GREATER: EXTREME DIFFICULTY
HEAVY_WORK: MODERATE DIFFICULTY
GOING_DOWN_1_FLIGHT_OF_STAIRS: NO DIFFICULTY AT ALL
STANDING_FOR_15_MINUTES: SLIGHT DIFFICULTY
HOS_ADL_ITEM_SCORE_TOTAL: 33
HOS_ADL_HIGHEST_POTENTIAL_SCORE: 60
SITTING_FOR_15_MINUTES: NO DIFFICULTY AT ALL
WALKING_INITIALLY: MODERATE DIFFICULTY
ROLLING_OVER_IN_BED: SLIGHT DIFFICULTY
STEPPING_UP_AND_DOWN_CURBS: SLIGHT DIFFICULTY
TWISTING/PIVOTING_ON_INVOLVED_LEG: EXTREME DIFFICULTY
HOS_ADL_COUNT: 15
WALKING_DOWN_STEEP_HILLS: MODERATE DIFFICULTY
WALKING_UP_STEEP_HILLS: MODERATE DIFFICULTY
WALKING_APPROXIMATELY_10_MINUTES: MODERATE DIFFICULTY

## 2022-06-27 ENCOUNTER — THERAPY VISIT (OUTPATIENT)
Dept: PHYSICAL THERAPY | Facility: CLINIC | Age: 52
End: 2022-06-27
Payer: COMMERCIAL

## 2022-06-27 DIAGNOSIS — M25.552 HIP PAIN, LEFT: ICD-10-CM

## 2022-06-27 DIAGNOSIS — R26.9 ABNORMAL GAIT: ICD-10-CM

## 2022-06-27 DIAGNOSIS — Z96.642 AFTERCARE FOLLOWING LEFT HIP JOINT REPLACEMENT SURGERY: Primary | ICD-10-CM

## 2022-06-27 DIAGNOSIS — Z47.1 AFTERCARE FOLLOWING LEFT HIP JOINT REPLACEMENT SURGERY: Primary | ICD-10-CM

## 2022-06-27 PROCEDURE — 97110 THERAPEUTIC EXERCISES: CPT | Mod: GP | Performed by: PHYSICAL THERAPIST

## 2022-07-28 ENCOUNTER — OFFICE VISIT (OUTPATIENT)
Dept: ORTHOPEDICS | Facility: CLINIC | Age: 52
End: 2022-07-28
Payer: COMMERCIAL

## 2022-07-28 DIAGNOSIS — Z96.642 S/P TOTAL LEFT HIP ARTHROPLASTY: Primary | ICD-10-CM

## 2022-07-28 PROCEDURE — 99024 POSTOP FOLLOW-UP VISIT: CPT | Performed by: ORTHOPAEDIC SURGERY

## 2022-07-28 ASSESSMENT — HOOS JR
GOING UP OR DOWN STAIRS: MILD
HOOS JR TOTAL INTERVAL SCORE: 80.56
BENDING TO THE FLOOR TO PICK UP OBJECT: MODERATE

## 2022-07-28 NOTE — LETTER
7/28/2022         RE: Milvia Carrasquillo  [address] [place] [address]        Dear Colleague,    Thank you for referring your patient, Milvia Carrasquillo, to the [facility] ORTHOPEDIC CLINIC [place]. Please see a copy of my visit note below.    Chief Complaint: Surgical Followup (12 week POP Left GOVIND DOS 5/4/22 )       HPI: Milvia Carrasquillo returns today in follow-up for her 3 month post-op visit. She is doing very well and rates her pain as a 0/10. She is not taking any pain medications. She is not using an assistive device for ambulation.       Medications and allergies are documented in the EMR and have been reviewed.    Current Outpatient Medications:      acetaminophen (TYLENOL) 325 MG tablet, Take 2 tablets (650 mg) by mouth every 4 hours as needed for mild pain, Disp: 60 tablet, Rfl: 0     amLODIPine (NORVASC) 10 MG tablet, Take 1 tablet (10 mg) by mouth in the morning., Disp: 90 tablet, Rfl: 3     losartan (COZAAR) 50 MG tablet, Take 50 mg by mouth daily, Disp: , Rfl:      zolpidem (AMBIEN) 5 MG tablet, Take tablet by mouth 15 minutes prior to sleep, for Sleep Study, Disp: 1 tablet, Rfl: 0     acetaminophen (TYLENOL) 325 MG tablet, Take 2 tablets (650 mg) by mouth every 4 hours as needed for other (Patient not taking: No sig reported), Disp: 60 tablet, Rfl: 0     aspirin (ASA) 81 MG EC tablet, Take 2 tablets (162 mg) by mouth daily (Patient not taking: No sig reported), Disp: 60 tablet, Rfl: 0     losartan (COZAAR) 100 MG tablet, Take 1 tablet (100 mg) by mouth in the morning. (Patient not taking: No sig reported), Disp: 90 tablet, Rfl: 3  Allergies: Patient has no known allergies.    Physical Exam:  On physical examination the patient appears the stated age, is in no acute distress, affect is appropriate, and breathing is non-labored.  There were no vitals taken for this visit.  There is no height or weight on file to calculate BMI.  Gait: mild antalgic  gait  Incision: Well healed   ROM: 0-90 without issue.   Distally, the circulatory, motor, and sensation exam is intact with 5/5 EHL, gastroc-soleus, and tibialis anterior.  Sensation to light touch is intact- does endorse mild numbness/tingling over the plantar aspect of the foot.  Dorsalis pedis and posterior tibialis pulses are palpable.  There are no sores on the feet, no bruising, and no lymphedema.    X-rays:    I reviewed the x-rays dated today.  Previous films reviewed.    Findings:  Normal progression for a total hip arthroplasty without evidence of loosening or subsidence.    Assessment: 52 y/o woman who is 3 months s/p left GOVIND. Doing very well and without complaints   Plan:   Continue with activity as tolerated  Follow up in 1 year with repeat Xrays of the pelvis    Brijesh Mcgee MD  Orthopedic Surgery PGY4        I have personally examined this patient and have reviewed the clinical presentation and progress note with the resident. I agree with the treatment plan as outlined. The plan was formulated with the resident on the day of the resident's dictation.    Joe Genao MD

## 2022-07-28 NOTE — NURSING NOTE
Reason For Visit:   Chief Complaint   Patient presents with     Surgical Followup     12 week POP Left GOVIND DOS 5/4/22        There were no vitals taken for this visit.    Pain Assessment  Patient Currently in Pain: Denies (some tingling in the palm of her feet that she had before surgery)    Debo Bass ATC

## 2022-07-28 NOTE — PROGRESS NOTES
Chief Complaint: Surgical Followup (12 week POP Left GOVIND DOS 5/4/22 )       HPI: Milvia Carrasquillo returns today in follow-up for her 3 month post-op visit. She is doing very well and rates her pain as a 0/10. She is not taking any pain medications. She is not using an assistive device for ambulation.       Medications and allergies are documented in the EMR and have been reviewed.    Current Outpatient Medications:      acetaminophen (TYLENOL) 325 MG tablet, Take 2 tablets (650 mg) by mouth every 4 hours as needed for mild pain, Disp: 60 tablet, Rfl: 0     amLODIPine (NORVASC) 10 MG tablet, Take 1 tablet (10 mg) by mouth in the morning., Disp: 90 tablet, Rfl: 3     losartan (COZAAR) 50 MG tablet, Take 50 mg by mouth daily, Disp: , Rfl:      zolpidem (AMBIEN) 5 MG tablet, Take tablet by mouth 15 minutes prior to sleep, for Sleep Study, Disp: 1 tablet, Rfl: 0     acetaminophen (TYLENOL) 325 MG tablet, Take 2 tablets (650 mg) by mouth every 4 hours as needed for other (Patient not taking: No sig reported), Disp: 60 tablet, Rfl: 0     aspirin (ASA) 81 MG EC tablet, Take 2 tablets (162 mg) by mouth daily (Patient not taking: No sig reported), Disp: 60 tablet, Rfl: 0     losartan (COZAAR) 100 MG tablet, Take 1 tablet (100 mg) by mouth in the morning. (Patient not taking: No sig reported), Disp: 90 tablet, Rfl: 3  Allergies: Patient has no known allergies.    Physical Exam:  On physical examination the patient appears the stated age, is in no acute distress, affect is appropriate, and breathing is non-labored.  There were no vitals taken for this visit.  There is no height or weight on file to calculate BMI.  Gait: mild antalgic gait  Incision: Well healed   ROM: 0-90 without issue.   Distally, the circulatory, motor, and sensation exam is intact with 5/5 EHL, gastroc-soleus, and tibialis anterior.  Sensation to light touch is intact- does endorse mild numbness/tingling over the plantar aspect of the foot.   Dorsalis pedis and posterior tibialis pulses are palpable.  There are no sores on the feet, no bruising, and no lymphedema.    X-rays:    I reviewed the x-rays dated today.  Previous films reviewed.    Findings:  Normal progression for a total hip arthroplasty without evidence of loosening or subsidence.    Assessment: 50 y/o woman who is 3 months s/p left GOVIND. Doing very well and without complaints   Plan:   Continue with activity as tolerated  Follow up in 1 year with repeat Xrays of the pelvis    Brijesh Mcgee MD  Orthopedic Surgery PGY4

## 2022-08-10 NOTE — PROGRESS NOTES
Subjective:  HPI  Physical Exam                    Objective:  System    Physical Exam    General     ROS    Assessment/Plan:    DISCHARGE REPORT    Progress reporting period is from 5/11/22 to 6/27/22.       SUBJECTIVE  Subjective changes noted by patient: Per SOAP note 6/27/22: Milvia reports that first part of morning is hard with first step and improves as day progress.    Current Pain level: 0/10.     Initial Pain level: 8/10.   Changes in function:  Yes (See Goal flowsheet attached for changes in current functional level)  Adverse reaction to treatment or activity: None    OBJECTIVE  Changes noted in objective findings:  Patient has failed to return to therapy so current objective findings are unknown.  Per SOAP note 6/27/22: Encouraged continued use of cane due to antalgic gait without cane.     ASSESSMENT/PLAN  Updated problem list and treatment plan: Diagnosis 1:  S/P L GOVIND  Pain -  hot/cold therapy, manual therapy, self management, education and home program  Decreased strength - therapeutic exercise, therapeutic activities and home program  Impaired balance - neuro re-education, gait training, therapeutic activities, adaptive equipment/assistive device and home program  Impaired gait - gait training, assistive devices and home program  Impaired muscle performance - neuro re-education and home program  Decreased function - therapeutic activities and home program  STG/LTGs have been met or progress has been made towards goals:  Yes (See Goal flow sheet completed today.)  Assessment of Progress: The patient's condition is improving.  The patient has not returned to therapy. Current status is unknown.  Patient is meeting short term goals and is progressing towards long term goals.  Self Management Plans:  Patient is independent in a home treatment program.  Patient is independent in self management of symptoms.  I have re-evaluated this patient and find that the nature, scope, duration and intensity of the  therapy is appropriate for the medical condition of the patient.  Milvia continues to require the following intervention to meet STG and LTG's:  PT intervention is no longer required to meet STG/LTG.    Recommendations:  This patient is ready to be discharged from therapy and continue their home treatment program.    Please refer to the daily flowsheet for treatment today, total treatment time and time spent performing 1:1 timed codes.

## 2022-08-22 ENCOUNTER — LAB (OUTPATIENT)
Dept: LAB | Facility: CLINIC | Age: 52
End: 2022-08-22

## 2022-08-22 ENCOUNTER — OFFICE VISIT (OUTPATIENT)
Dept: INTERNAL MEDICINE | Facility: CLINIC | Age: 52
End: 2022-08-22
Payer: COMMERCIAL

## 2022-08-22 VITALS
SYSTOLIC BLOOD PRESSURE: 132 MMHG | HEART RATE: 80 BPM | DIASTOLIC BLOOD PRESSURE: 83 MMHG | OXYGEN SATURATION: 97 % | WEIGHT: 202.4 LBS | HEIGHT: 65 IN | BODY MASS INDEX: 33.72 KG/M2

## 2022-08-22 DIAGNOSIS — E11.9 TYPE 2 DIABETES MELLITUS WITHOUT COMPLICATION, WITHOUT LONG-TERM CURRENT USE OF INSULIN (H): ICD-10-CM

## 2022-08-22 DIAGNOSIS — E11.9 TYPE 2 DIABETES MELLITUS WITHOUT COMPLICATION, WITHOUT LONG-TERM CURRENT USE OF INSULIN (H): Primary | ICD-10-CM

## 2022-08-22 LAB
CREAT UR-MCNC: 91 MG/DL
HBA1C MFR BLD: 6.6 % (ref 0–5.6)
MICROALBUMIN UR-MCNC: 21 MG/L
MICROALBUMIN/CREAT UR: 23.08 MG/G CR (ref 0–25)

## 2022-08-22 PROCEDURE — 83036 HEMOGLOBIN GLYCOSYLATED A1C: CPT | Performed by: PATHOLOGY

## 2022-08-22 PROCEDURE — 36415 COLL VENOUS BLD VENIPUNCTURE: CPT | Performed by: PATHOLOGY

## 2022-08-22 PROCEDURE — 82043 UR ALBUMIN QUANTITATIVE: CPT | Performed by: PATHOLOGY

## 2022-08-22 PROCEDURE — 99214 OFFICE O/P EST MOD 30 MIN: CPT | Mod: GC | Performed by: INTERNAL MEDICINE

## 2022-08-22 RX ORDER — METFORMIN HYDROCHLORIDE 750 MG/1
750 TABLET, EXTENDED RELEASE ORAL
Qty: 90 TABLET | Refills: 3 | Status: SHIPPED | OUTPATIENT
Start: 2022-08-22 | End: 2023-04-24

## 2022-08-22 NOTE — NURSING NOTE
Milvia Carrasquillo is a 51 year old female patient that presents today in clinic for the following:    Chief Complaint   Patient presents with     RECHECK     routine     The patient's allergies and medications were reviewed as noted. A set of vitals were recorded as noted without incident. The patient does not have any other questions for the provider.    Marcelino Jones, EMT at 2:08 PM on 8/22/2022

## 2022-08-22 NOTE — PROGRESS NOTES
PRIMARY [facility]ARE CENTER       SUBJECTIVE:  Milvia Carrasquillo is a 51 year old female who comes in for HTN and DM follow up. On a pre-op evaluation she was noted to have HTN, and a 24 BP monitor showed average /100. Procedure was delayed and started on 50mg losartan and 5mg amlodipine. Currently on 100mg losartan and 10mg amlodipine and taking them as prescribed. Not having any lightheadedness, dizziness, CP, SOB, claudication, headache, vision changes. Has BP cuff at home but doesn't check her home BP. Was recommended sleep evaluation for SUSANNA, was cancelled b/c her daughter had COVID. Not wanting to reschedule sleep study because she feels the mask will be uncomfortable and she feels she doesn't have sleep apnea. Says she sleeps restfully and doesn't snore, although seems in the past her partner said she snored a lot.     Had L GOVIND on 5/4/22. Recovering well. No pain and not taking any pain meds. Regaining mobility. Has been walking more.    Recently diagnosed with DM with A1C 7.3 in 5/2022. Doesn't check blood sugars at home. No urinary frequency or increased thirst. Has some tingling/numbness in L LE from surgery, her surgeon said this would take time to improve. Tingling/numbness isn't bilateral. Endorses darkening of skin under armpits. Not on any DM meds. Eats lots of food with flour, eats out 50% of the time. Drinks carbonated water and coke zero. Snacks occasionally, usually cookies. Tries to watch portion control. Starting to exercise more after procedure, currently walks a few minutes each day. She is trying to make lifestyle changes to lose weight. No family history of DM.      ROS:   A detailed Review of Systems was performed, verified and is negative except as documented in the HPI.  All health questionnaires were reviewed, verified and relevant information documented above.    OBJECTIVE:    There were no vitals taken for this visit.   Wt Readings from Last 1 Encounters:    05/27/22 91.6 kg (202 lb)     Physical Exam  Constitutional:       General: She is not in acute distress.     Appearance: She is not ill-appearing.   HENT:      Head: Normocephalic and atraumatic.   Eyes:      General: No scleral icterus.     Extraocular Movements: Extraocular movements intact.      Conjunctiva/sclera: Conjunctivae normal.   Cardiovascular:      Rate and Rhythm: Normal rate and regular rhythm.      Pulses: Normal pulses.      Heart sounds: Normal heart sounds. No murmur heard.    No gallop.   Pulmonary:      Effort: Pulmonary effort is normal. No respiratory distress.      Breath sounds: Normal breath sounds. No wheezing or rales.   Abdominal:      General: Abdomen is flat. Bowel sounds are normal.      Palpations: Abdomen is soft.   Musculoskeletal:      Comments: Trace b/l LE edema   Skin:     Findings: No lesion.      Comments: No hyperpigmentation on back of neck   Neurological:      Mental Status: She is alert and oriented to person, place, and time.      Comments: No sensory deficits in b/l LE   Psychiatric:         Mood and Affect: Mood normal.            ASSESSMENT/PLAN:    Milvia Carrasquillo is a 51 year old female who comes in for HTN and DM follow up.    #HTN  - BP improved in clinic today at 132/83. Currently on losartan 100mg and amlodipine 10mg  - Continue current medications  - Discussed with patient rescheduling sleep study. Discussed different types of sleep masks and dental equipment, along with risks of untreated SUSANNA. At this time she does not want to reschedule sleep study. Can readdress at next visit    #DM  - Started metformin 750mg XR once daily  - Discussed lifestyle changes with patient  - Ordered A1C and urine MAC      Pt was seen and plan of care discussed with Dr. Ehsan Hubbard, PGY1  Aug 22, 2022

## 2022-10-03 ENCOUNTER — HEALTH MAINTENANCE LETTER (OUTPATIENT)
Age: 52
End: 2022-10-03

## 2023-02-12 ENCOUNTER — HEALTH MAINTENANCE LETTER (OUTPATIENT)
Age: 53
End: 2023-02-12

## 2023-04-24 ENCOUNTER — LAB (OUTPATIENT)
Dept: LAB | Facility: CLINIC | Age: 53
End: 2023-04-24
Payer: COMMERCIAL

## 2023-04-24 ENCOUNTER — OFFICE VISIT (OUTPATIENT)
Dept: INTERNAL MEDICINE | Facility: CLINIC | Age: 53
End: 2023-04-24
Payer: COMMERCIAL

## 2023-04-24 VITALS
TEMPERATURE: 98.4 F | HEIGHT: 65 IN | DIASTOLIC BLOOD PRESSURE: 69 MMHG | WEIGHT: 193.3 LBS | BODY MASS INDEX: 32.21 KG/M2 | SYSTOLIC BLOOD PRESSURE: 127 MMHG | OXYGEN SATURATION: 95 % | HEART RATE: 70 BPM

## 2023-04-24 DIAGNOSIS — I10 SEVERE HYPERTENSION: ICD-10-CM

## 2023-04-24 DIAGNOSIS — E11.9 TYPE 2 DIABETES MELLITUS WITHOUT COMPLICATION, WITHOUT LONG-TERM CURRENT USE OF INSULIN (H): ICD-10-CM

## 2023-04-24 DIAGNOSIS — Z12.31 ENCOUNTER FOR SCREENING MAMMOGRAM FOR BREAST CANCER: ICD-10-CM

## 2023-04-24 DIAGNOSIS — Z12.31 ENCOUNTER FOR SCREENING MAMMOGRAM FOR BREAST CANCER: Primary | ICD-10-CM

## 2023-04-24 DIAGNOSIS — R73.03 PRE-DIABETES: ICD-10-CM

## 2023-04-24 DIAGNOSIS — Z12.11 SCREENING FOR COLON CANCER: ICD-10-CM

## 2023-04-24 DIAGNOSIS — K75.81 STEATOHEPATITIS: ICD-10-CM

## 2023-04-24 LAB
ALBUMIN SERPL BCG-MCNC: 4.4 G/DL (ref 3.5–5.2)
ALP SERPL-CCNC: 336 U/L (ref 35–104)
ALT SERPL W P-5'-P-CCNC: 101 U/L (ref 10–35)
ANION GAP SERPL CALCULATED.3IONS-SCNC: 10 MMOL/L (ref 7–15)
AST SERPL W P-5'-P-CCNC: 56 U/L (ref 10–35)
BILIRUB SERPL-MCNC: 0.3 MG/DL
BUN SERPL-MCNC: 20 MG/DL (ref 6–20)
CALCIUM SERPL-MCNC: 9.8 MG/DL (ref 8.6–10)
CHLORIDE SERPL-SCNC: 102 MMOL/L (ref 98–107)
CREAT SERPL-MCNC: 1.02 MG/DL (ref 0.51–0.95)
CREAT UR-MCNC: 115 MG/DL
DEPRECATED HCO3 PLAS-SCNC: 25 MMOL/L (ref 22–29)
GFR SERPL CREATININE-BSD FRML MDRD: 65 ML/MIN/1.73M2
GLUCOSE SERPL-MCNC: 89 MG/DL (ref 70–99)
HBA1C MFR BLD: 6.3 %
MICROALBUMIN UR-MCNC: <12 MG/L
MICROALBUMIN/CREAT UR: NORMAL MG/G{CREAT}
POTASSIUM SERPL-SCNC: 4.4 MMOL/L (ref 3.4–5.3)
PROT SERPL-MCNC: 8.4 G/DL (ref 6.4–8.3)
SODIUM SERPL-SCNC: 137 MMOL/L (ref 136–145)
TSH SERPL DL<=0.005 MIU/L-ACNC: 1.8 UIU/ML (ref 0.3–4.2)

## 2023-04-24 PROCEDURE — 83036 HEMOGLOBIN GLYCOSYLATED A1C: CPT | Performed by: PATHOLOGY

## 2023-04-24 PROCEDURE — 99214 OFFICE O/P EST MOD 30 MIN: CPT | Mod: 25 | Performed by: INTERNAL MEDICINE

## 2023-04-24 PROCEDURE — 0124A COVID-19 VACCINE BIVALENT BOOSTER 12+ (PFIZER): CPT | Performed by: INTERNAL MEDICINE

## 2023-04-24 PROCEDURE — 84443 ASSAY THYROID STIM HORMONE: CPT | Performed by: PATHOLOGY

## 2023-04-24 PROCEDURE — 80053 COMPREHEN METABOLIC PANEL: CPT | Performed by: PATHOLOGY

## 2023-04-24 PROCEDURE — 36415 COLL VENOUS BLD VENIPUNCTURE: CPT | Performed by: PATHOLOGY

## 2023-04-24 PROCEDURE — 82043 UR ALBUMIN QUANTITATIVE: CPT | Performed by: PATHOLOGY

## 2023-04-24 PROCEDURE — 99000 SPECIMEN HANDLING OFFICE-LAB: CPT | Performed by: PATHOLOGY

## 2023-04-24 PROCEDURE — 82570 ASSAY OF URINE CREATININE: CPT | Performed by: PATHOLOGY

## 2023-04-24 PROCEDURE — 91312 COVID-19 VACCINE BIVALENT BOOSTER 12+ (PFIZER): CPT | Performed by: INTERNAL MEDICINE

## 2023-04-24 RX ORDER — AMLODIPINE BESYLATE 10 MG/1
10 TABLET ORAL DAILY
Qty: 90 TABLET | Refills: 3 | Status: SHIPPED | OUTPATIENT
Start: 2023-04-24 | End: 2024-04-26

## 2023-04-24 RX ORDER — METFORMIN HYDROCHLORIDE 750 MG/1
750 TABLET, EXTENDED RELEASE ORAL
Qty: 90 TABLET | Refills: 3 | Status: SHIPPED | OUTPATIENT
Start: 2023-04-24 | End: 2024-06-17

## 2023-04-24 RX ORDER — LOSARTAN POTASSIUM 100 MG/1
100 TABLET ORAL DAILY
Qty: 90 TABLET | Refills: 3 | Status: SHIPPED | OUTPATIENT
Start: 2023-04-24 | End: 2024-01-26

## 2023-04-24 NOTE — PROGRESS NOTES
"S) Ms. Nieto is a 53 yo woman with HTN and DM2 seen for ongoing care and f/u. Doing well without pain since hip replacement. Now perimenopausal without menses for 7 mos (not pregnant!). Hot flushes minimal. BP well controlled on current regimen. No SEs reported on losartan, amlodipine, metformin.       Current Outpatient Medications   Medication     amLODIPine (NORVASC) 10 MG tablet     losartan (COZAAR) 100 MG tablet     metFORMIN (GLUCOPHAGE-XR) 750 MG 24 hr tablet     acetaminophen (TYLENOL) 325 MG tablet     No current facility-administered medications for this visit.       O) /69 (BP Location: Right arm, Patient Position: Sitting, Cuff Size: Adult Large)   Pulse 70   Temp 98.4  F (36.9  C) (Oral)   Ht 1.638 m (5' 4.5\")   Wt 87.7 kg (193 lb 4.8 oz)   SpO2 95%   BMI 32.67 kg/m    Well appearing  Cor RRR no MRG  Lungs clear  Abd benign  No rashes  Mentation nl  Joints nl    A/P) 1) HCM. Need to update records on preventive care including immunizations- will give bivalent covid vax today. Pap/Mammo/Colon screening discussed. Mammo and Pap ordered. FIT ordered. Diet/exercise discussed. Menopause options discussed- she will not opt for HRT at this time as symptoms are very manageable.   2) HTN. Good control on current regimen. Due for labs (CMP). Meds refilled.  3) DM2. Metformin monotherapy with 10 lb wt loss and no GI side effects. Continue. Will need eye exam and microalbumin screen. Strong indication for statin- current LFT elevation workup takes priority and will discuss this once workup/plan in place for LFTs.   Lab Results   Component Value Date    A1C 6.6 08/22/2022    A1C 7.3 05/05/2022     Given the obesity, HTN, elevated A1C and LFTs, she would be a good candidate for a GLP1 agonist such as semaglutide.   4) Elevated LFTs. Presumed steatohepatitis. Not on a statin. Check RUQ ultrasound and consider addition of semaglutide.    30 minutes spent on the date of the encounter performing chart " review, history and exam, documentation and further activities as noted above.    Claudio Koehler MD  Primary Care Center  [facility]

## 2023-04-24 NOTE — NURSING NOTE
"Milvia Carrasquillo is a 52 year old female patient that presents today in clinic for the following:    Chief Complaint   Patient presents with     RECHECK     BP medication refill.  Diabetes medication refill. Metformin.  Bilateral pain on bottom of foot.  Skin dryness.  Discuss menopause.      The patient's allergies and medications were reviewed as noted. A set of vitals were recorded as noted without incident: /69 (BP Location: Right arm, Patient Position: Sitting, Cuff Size: Adult Large)   Pulse 70   Temp 98.4  F (36.9  C) (Oral)   Ht 1.638 m (5' 4.5\")   Wt 87.7 kg (193 lb 4.8 oz)   SpO2 95%   BMI 32.67 kg/m  . The patient does not have any other questions for the provider.    Makenna Tang, EMT at 5:24 PM on 4/24/2023.  Primary care clinic: [phone]  "

## 2023-04-26 ENCOUNTER — TELEPHONE (OUTPATIENT)
Dept: CARE COORDINATION | Facility: CLINIC | Age: 53
End: 2023-04-26
Payer: COMMERCIAL

## 2023-04-26 ENCOUNTER — APPOINTMENT (OUTPATIENT)
Dept: INTERPRETER SERVICES | Facility: CLINIC | Age: 53
End: 2023-04-26
Payer: COMMERCIAL

## 2023-04-26 PROCEDURE — 82274 ASSAY TEST FOR BLOOD FECAL: CPT | Performed by: PATHOLOGY

## 2023-04-26 PROCEDURE — 99000 SPECIMEN HANDLING OFFICE-LAB: CPT | Performed by: PATHOLOGY

## 2023-04-26 NOTE — TELEPHONE ENCOUNTER
"Per Dr. Koehler:   \"Hi Ms. Nieto,   It was great to see you yesterday. The blood sugars are improved and the liver tests are still a bit elevated. I am concerned this is something called steatohepatitis or \"fatty liver\" and want to get an ultrasound. If so, we could start a weekly medication that is widely used for weight reduction and also helps with blood sugars. It is safe and effective and also helps with the liver. It's called Ozempic and let's find a time to discuss it after your liver ultrasound.   Claudio\"     RN called and spoke with patient to relay above results message to patient.  and patient given the number for the clinic-  to stay with patient and help patient with calling and scheduling the ultrasound. Patient was given the clinic number as well as the  line. Pt had not further questions.     CLEVE WRIGHT RN on 4/26/2023 at 2:48 PM    "

## 2023-04-27 ENCOUNTER — ANCILLARY PROCEDURE (OUTPATIENT)
Dept: ULTRASOUND IMAGING | Facility: CLINIC | Age: 53
End: 2023-04-27
Attending: INTERNAL MEDICINE
Payer: COMMERCIAL

## 2023-04-27 ENCOUNTER — TELEPHONE (OUTPATIENT)
Dept: INTERNAL MEDICINE | Facility: CLINIC | Age: 53
End: 2023-04-27

## 2023-04-27 DIAGNOSIS — K75.81 STEATOHEPATITIS: ICD-10-CM

## 2023-04-27 PROCEDURE — 76705 ECHO EXAM OF ABDOMEN: CPT | Mod: GC | Performed by: STUDENT IN AN ORGANIZED HEALTH CARE EDUCATION/TRAINING PROGRAM

## 2023-04-27 NOTE — TELEPHONE ENCOUNTER
"----- Message from Eloise Paula RN sent at 4/27/2023  7:15 AM CDT -----  Farzaneh Linares,   I would say that 5/15 is okay. If Dr. Koehler wants to see her sooner I'm sure he will let us know.   Thanks so much!  Eloise   ----- Message -----  From: Vijay Hernandez  Sent: 4/26/2023   3:21 PM CDT  To: Eloise Paula RN    Hi,     Looks like the US is scheduled for 4/27/23- [facility] soonest opening is 5/15/23. Would we want to find a sooner opening or should I call offering that date?    Thanks  Vijay  ----- Message -----  From: Eloise Paula RN  Sent: 4/26/2023   2:50 PM CDT  To: Clinic Coordinators-[facility]-  Pt is going to be calling about an ultrasound, could you help her schedule a follow up appointment with Dr. Koehler for after the ultrasound as well?  Thanks!  Eloise     ----- Message -----  From: Ehsan Koehler MD  Sent: 4/25/2023   8:32 AM CDT  To: Eloise Paula RN; [facility] Rooming Staff-[facility] Ms. Nieto,  It was great to see you yesterday. The blood sugars are improved and the liver tests are still a bit elevated. I am concerned this is something called steatohepatitis or \"fatty liver\" and want to get an ultrasound. If so, we could start a weekly medication that is widely used for weight reduction and also helps with blood sugars. It is safe and effective and also helps with the liver. It's called Ozempic and let's find a time to discuss it after your liver ultrasound.   Claudio        "

## 2023-04-29 LAB — HEMOCCULT STL QL IA: NEGATIVE

## 2023-05-02 ENCOUNTER — TELEPHONE (OUTPATIENT)
Dept: CARE COORDINATION | Facility: CLINIC | Age: 53
End: 2023-05-02
Payer: COMMERCIAL

## 2023-05-02 NOTE — RESULT ENCOUNTER NOTE
"[employer]- the liver ultrasound does show some changes that suggest \"fatty liver disease\" and we should talk about options. There is a medication used for diabetes and weight loss- Ozempic- that has shown to be helpful and we should talk about it. Let's set up a visit soon to discuss this. Claudio"

## 2023-05-15 ENCOUNTER — OFFICE VISIT (OUTPATIENT)
Dept: INTERNAL MEDICINE | Facility: CLINIC | Age: 53
End: 2023-05-15
Payer: COMMERCIAL

## 2023-05-15 VITALS
BODY MASS INDEX: 32.27 KG/M2 | WEIGHT: 189 LBS | HEART RATE: 72 BPM | DIASTOLIC BLOOD PRESSURE: 84 MMHG | SYSTOLIC BLOOD PRESSURE: 138 MMHG | HEIGHT: 64 IN | OXYGEN SATURATION: 96 %

## 2023-05-15 DIAGNOSIS — K76.0 FATTY LIVER DISEASE, NONALCOHOLIC: ICD-10-CM

## 2023-05-15 DIAGNOSIS — Z00.00 HEALTHCARE MAINTENANCE: ICD-10-CM

## 2023-05-15 DIAGNOSIS — E11.9 TYPE 2 DIABETES MELLITUS WITHOUT COMPLICATION, WITHOUT LONG-TERM CURRENT USE OF INSULIN (H): Primary | ICD-10-CM

## 2023-05-15 PROCEDURE — 99214 OFFICE O/P EST MOD 30 MIN: CPT | Performed by: INTERNAL MEDICINE

## 2023-05-15 NOTE — NURSING NOTE
Milvia Carrasquillo is a 52 year old female patient that presents today in clinic for the following:    Chief Complaint   Patient presents with     RECHECK     Diabetes follow up     The patient's allergies and medications were reviewed as noted. A set of vitals were recorded as noted without incident. The patient does not have any other questions for the provider.    Marcelino Jones, EMT at 9:56 AM on 5/15/2023

## 2023-05-15 NOTE — PROGRESS NOTES
"S) Ms. Nieto is a 51 yo woman with HTN, hyperlipidemia, DM2, and obesity seen to discuss elevated LFTs and liver ultrasound findings consistent with non-alcoholic steatohepatitis. She does not regularly use alcohol (once every few months). She is very interested in losing weight. Tolerates metformin daily. Long discussion on medication options including GLP-1 agonists and have decided upon semaglutide as it will contribute to better glycemic control, weight loss, and improvement in AST/ALT and liver histology.    Liver Function Studies - Recent Labs   Lab Test 04/24/23  1814   PROTTOTAL 8.4*   ALBUMIN 4.4   BILITOTAL 0.3   ALKPHOS 336*   AST 56*   *     O) /84 (BP Location: Right arm, Patient Position: Sitting, Cuff Size: Adult Regular)   Pulse 72   Ht 1.638 m (5' 4.49\")   Wt 85.7 kg (189 lb)   SpO2 96%   BMI 31.95 kg/m    Well appearing  Nl mentation  Cor RRR no MRG    A/P) 1) Elevated transaminases and alk phos in setting of HTN, hyperlipidemia, DM2 and obesity. Liver ultrasound findings show mild hepatomegaly with increased echogenicity c/w TELLEZ. Co-morbid DM2 so GLP-1 receptor agonist therapy would be a good choice. Semaglutide has been shown to improve liver histology and decrease fibrosis as well as facilitate weight loss. Cost can be a barrier. Will check fibroscan to determine level of fibrosis present prior to initiation of therapy. Will initiate 0.25 mg weekly for 4 weeks then increase to 0.5 mg weekly thereafter. Will complete prior authorization paperwork as needed for insurance coverage.    30 minutes spent on the date of the encounter performing chart review, history and exam, documentation and further activities as noted above.    Claudio Koehler MD  Primary Care Center  [facility]        "

## 2023-05-20 ENCOUNTER — HEALTH MAINTENANCE LETTER (OUTPATIENT)
Age: 53
End: 2023-05-20

## 2023-05-24 DIAGNOSIS — Z96.642 S/P TOTAL LEFT HIP ARTHROPLASTY: Primary | ICD-10-CM

## 2023-05-25 ENCOUNTER — OFFICE VISIT (OUTPATIENT)
Dept: INTERNAL MEDICINE | Facility: CLINIC | Age: 53
End: 2023-05-25
Payer: COMMERCIAL

## 2023-05-25 ENCOUNTER — OFFICE VISIT (OUTPATIENT)
Dept: ORTHOPEDICS | Facility: CLINIC | Age: 53
End: 2023-05-25
Payer: COMMERCIAL

## 2023-05-25 ENCOUNTER — ANCILLARY PROCEDURE (OUTPATIENT)
Dept: GENERAL RADIOLOGY | Facility: CLINIC | Age: 53
End: 2023-05-25
Attending: ORTHOPAEDIC SURGERY
Payer: COMMERCIAL

## 2023-05-25 VITALS
HEART RATE: 76 BPM | DIASTOLIC BLOOD PRESSURE: 77 MMHG | TEMPERATURE: 98.3 F | HEIGHT: 65 IN | WEIGHT: 187.3 LBS | BODY MASS INDEX: 31.21 KG/M2 | SYSTOLIC BLOOD PRESSURE: 124 MMHG | OXYGEN SATURATION: 95 %

## 2023-05-25 VITALS — WEIGHT: 185 LBS | BODY MASS INDEX: 30.82 KG/M2 | HEIGHT: 65 IN

## 2023-05-25 DIAGNOSIS — K75.81 STEATOHEPATITIS, NON-ALCOHOLIC: ICD-10-CM

## 2023-05-25 DIAGNOSIS — Z12.4 SCREENING FOR CERVICAL CANCER: Primary | ICD-10-CM

## 2023-05-25 DIAGNOSIS — E78.5 HYPERLIPIDEMIA LDL GOAL <70: ICD-10-CM

## 2023-05-25 DIAGNOSIS — Z47.89 ORTHOPEDIC AFTERCARE: Primary | ICD-10-CM

## 2023-05-25 DIAGNOSIS — Z96.642 S/P TOTAL LEFT HIP ARTHROPLASTY: ICD-10-CM

## 2023-05-25 PROBLEM — M16.12 OSTEOARTHRITIS OF LEFT HIP, UNSPECIFIED OSTEOARTHRITIS TYPE: Status: RESOLVED | Noted: 2022-05-04 | Resolved: 2023-05-25

## 2023-05-25 PROBLEM — Z47.1 AFTERCARE FOLLOWING LEFT HIP JOINT REPLACEMENT SURGERY: Status: RESOLVED | Noted: 2022-05-11 | Resolved: 2023-05-25

## 2023-05-25 PROBLEM — M25.552 HIP PAIN, LEFT: Status: RESOLVED | Noted: 2022-05-11 | Resolved: 2023-05-25

## 2023-05-25 PROBLEM — R73.03 PREDIABETES: Status: RESOLVED | Noted: 2018-05-17 | Resolved: 2023-05-25

## 2023-05-25 PROBLEM — R26.9 ABNORMAL GAIT: Status: RESOLVED | Noted: 2022-05-11 | Resolved: 2023-05-25

## 2023-05-25 PROCEDURE — 73502 X-RAY EXAM HIP UNI 2-3 VIEWS: CPT | Mod: GC | Performed by: RADIOLOGY

## 2023-05-25 PROCEDURE — 87624 HPV HI-RISK TYP POOLED RSLT: CPT | Performed by: PATHOLOGY

## 2023-05-25 PROCEDURE — 99213 OFFICE O/P EST LOW 20 MIN: CPT | Performed by: NURSE PRACTITIONER

## 2023-05-25 PROCEDURE — 99213 OFFICE O/P EST LOW 20 MIN: CPT | Performed by: ORTHOPAEDIC SURGERY

## 2023-05-25 PROCEDURE — G0145 SCR C/V CYTO,THINLAYER,RESCR: HCPCS | Performed by: NURSE PRACTITIONER

## 2023-05-25 PROCEDURE — 99000 SPECIMEN HANDLING OFFICE-LAB: CPT | Performed by: PATHOLOGY

## 2023-05-25 ASSESSMENT — HOOS JR: HOOS JR TOTAL INTERVAL SCORE: 100

## 2023-05-25 NOTE — NURSING NOTE
"Reason For Visit:   Chief Complaint   Patient presents with     RECHECK     1 year s/p left GOVIND DOS 5/4/22. Doing very well. Lacking some sensation on the bottom of the foot. Since before the surgery.        Ht 1.651 m (5' 5\")   Wt 83.9 kg (185 lb)   BMI 30.79 kg/m           Jeniffer Marshall ATC    "

## 2023-05-25 NOTE — PROGRESS NOTES
"Chief Complaint: RECHECK (1 year s/p left GOVIND DOS 5/4/22. Doing very well. Lacking some sensation on the bottom of the foot. Since before the surgery. )         HPI: Milvia Carrasquillo returns today in follow-up for her left hip.  She reports she is doing very well.  She reports she has had no pain over the past year.  Her function is such that she is not limited by her hip.  She is very happy with how she is done so far.  She is here for routine follow-up.    Current Status:  HOOS Jr: 100  Global Mental Health Score - (P) 18  Global Physical Health Score - (P) 14  PROMIS TOTAL - SUBSCORES - (P) 32  Medications and allergies are documented in the EMR and have been reviewed.      Current Outpatient Medications:      acetaminophen (TYLENOL) 325 MG tablet, Take 2 tablets (650 mg) by mouth every 4 hours as needed for other, Disp: 60 tablet, Rfl: 0     amLODIPine (NORVASC) 10 MG tablet, Take 1 tablet (10 mg) by mouth daily, Disp: 90 tablet, Rfl: 3     losartan (COZAAR) 100 MG tablet, Take 1 tablet (100 mg) by mouth daily, Disp: 90 tablet, Rfl: 3     metFORMIN (GLUCOPHAGE-XR) 750 MG 24 hr tablet, Take 1 tablet (750 mg) by mouth daily (with dinner), Disp: 90 tablet, Rfl: 3     semaglutide (OZEMPIC) 2 MG/3ML pen, Inject 0.25 mg weekly (every 7d) for 4 weeks then increase to 0.5 mg weekly thereafter, Disp: 3 mL, Rfl: 3    Allergies: Patient has no known allergies.        Physical Exam:  On physical examination the patient appears the stated age, is in no acute distress, affect is appropriate, and breathing is non-labored.    Ht 1.651 m (5' 5\")   Wt 83.9 kg (185 lb)   BMI 30.79 kg/m    Body mass index is 30.79 kg/m .    Rises from chair: Easily   Gait: Normal  ROM: Fluid and painless  Abduction:   Adduction:  Flexion: 90+      Distally, the circulatory, motor, and sensation exam is intact with 5/5 EHL, gastroc-soleus, and tibialis anterior.  Sensation to light touch is intact.  Dorsalis pedis and posterior tibialis " pulses are palpable.  There are no sores on the feet, no bruising, and no lymphedema.    We reviewed the radiographs together. These show the normal progression for a total hip arthroplasty without evidence of loosening or subsidence.     Assessment: Doing very well 1 year status post complex total hip arthroplasty.  We discussed activities on total hip and typical follow-up protocol.  All the patient's questions were answered to the best of my ability    Plan: Appropriate for 5-year follow-up sooner if issues  Referred Self

## 2023-05-25 NOTE — LETTER
"    5/25/2023         RE: Milvia Carrasquillo  [address] [place] [address]        Dear Colleague,    Thank you for referring your patient, Milvia Carrasquillo, to the [facility] [place]. Please see a copy of my visit note below.    Chief Complaint: RECHECK (1 year s/p left GOVIND DOS 5/4/22. Doing very well. Lacking some sensation on the bottom of the foot. Since before the surgery. )         HPI: Milvia Carrasquillo returns today in follow-up for her left hip.  She reports she is doing very well.  She reports she has had no pain over the past year.  Her function is such that she is not limited by her hip.  She is very happy with how she is done so far.  She is here for routine follow-up.    Current Status:  HOOS Jr: 100  Global Mental Health Score - (P) 18  Global Physical Health Score - (P) 14  PROMIS TOTAL - SUBSCORES - (P) 32  Medications and allergies are documented in the EMR and have been reviewed.      Current Outpatient Medications:     acetaminophen (TYLENOL) 325 MG tablet, Take 2 tablets (650 mg) by mouth every 4 hours as needed for other, Disp: 60 tablet, Rfl: 0    amLODIPine (NORVASC) 10 MG tablet, Take 1 tablet (10 mg) by mouth daily, Disp: 90 tablet, Rfl: 3    losartan (COZAAR) 100 MG tablet, Take 1 tablet (100 mg) by mouth daily, Disp: 90 tablet, Rfl: 3    metFORMIN (GLUCOPHAGE-XR) 750 MG 24 hr tablet, Take 1 tablet (750 mg) by mouth daily (with dinner), Disp: 90 tablet, Rfl: 3    semaglutide (OZEMPIC) 2 MG/3ML pen, Inject 0.25 mg weekly (every 7d) for 4 weeks then increase to 0.5 mg weekly thereafter, Disp: 3 mL, Rfl: 3    Allergies: Patient has no known allergies.        Physical Exam:  On physical examination the patient appears the stated age, is in no acute distress, affect is appropriate, and breathing is non-labored.    Ht 1.651 m (5' 5\")   Wt 83.9 kg (185 lb)   BMI 30.79 kg/m    Body mass index is 30.79 kg/m .    Rises from chair: Easily "   Gait: Normal  ROM: Fluid and painless  Abduction:   Adduction:  Flexion: 90+      Distally, the circulatory, motor, and sensation exam is intact with 5/5 EHL, gastroc-soleus, and tibialis anterior.  Sensation to light touch is intact.  Dorsalis pedis and posterior tibialis pulses are palpable.  There are no sores on the feet, no bruising, and no lymphedema.    We reviewed the radiographs together. These show the normal progression for a total hip arthroplasty without evidence of loosening or subsidence.     Assessment: Doing very well 1 year status post complex total hip arthroplasty.  We discussed activities on total hip and typical follow-up protocol.  All the patient's questions were answered to the best of my ability    Plan: Appropriate for 5-year follow-up sooner if issues  Referred Self        Joe Genao MD

## 2023-05-25 NOTE — PROGRESS NOTES
S: Milvia Carrasquillo is a 52 year old female followed by Dr. Claudio Koehler who returns today for a breast and pelvic exam and pap smear.  She has no complaints. She is scheduled for a mammogram on 5/31/23.  She is seen with the assistance of a virtual .         Patient Active Problem List   Diagnosis     HTN (hypertension)     Class 1 obesity due to excess calories with serious comorbidity and body mass index (BMI) of 34.0 to 34.9 in adult     Snoring     Diabetes mellitus, type 2 (H)     Hyperlipidemia LDL goal <70     Steatohepatitis, non-alcoholic            Past Medical History:   Diagnosis Date     HTN (hypertension)             Past Surgical History:   Procedure Laterality Date     ARTHROPLASTY HIP Left 5/4/2022    Procedure: ARTHROPLASTY, LEFT HIP, TOTAL;  Surgeon: Joe Genao MD;  Location:  OR            Social History     Tobacco Use     Smoking status: Never     Smokeless tobacco: Never   Vaping Use     Vaping status: Not on file   Substance Use Topics     Alcohol use: Yes     Comment: rare            Family History   Problem Relation Age of Onset     Sleep Apnea Mother              No Known Allergies         Current Outpatient Medications   Medication Sig Dispense Refill     acetaminophen (TYLENOL) 325 MG tablet Take 2 tablets (650 mg) by mouth every 4 hours as needed for other 60 tablet 0     amLODIPine (NORVASC) 10 MG tablet Take 1 tablet (10 mg) by mouth daily 90 tablet 3     losartan (COZAAR) 100 MG tablet Take 1 tablet (100 mg) by mouth daily 90 tablet 3     metFORMIN (GLUCOPHAGE-XR) 750 MG 24 hr tablet Take 1 tablet (750 mg) by mouth daily (with dinner) 90 tablet 3     semaglutide (OZEMPIC) 2 MG/3ML pen Inject 0.25 mg weekly (every 7d) for 4 weeks then increase to 0.5 mg weekly thereafter 3 mL 3       REVIEW OF SYSTEMS:  See above.    O:   /77 (BP Location: Right arm, Patient Position: Sitting, Cuff Size: Adult Regular)   Pulse 76   Temp 98.3  F (36.8  C)  "(Oral)   Ht 1.638 m (5' 4.5\")   Wt 85 kg (187 lb 4.8 oz)   SpO2 95%   BMI 31.65 kg/m      GENERAL APPEARANCE: healthy, alert and no distress  RESP: no distress.  CV:see VS  NEURO: alert and oriented.  Good historian.  MUSK: ambulatory.  BREASTS: no masses.  Pelvic Exam:  Vulva: No external lesions, normal hair distribution, no adenopathy  Vagina: Moist, pink, no abnormal discharge, well rugated, no lesions  Cervix: Pap smear is taken, parous, smooth, pink, no visible lesions  Uterus: unable to palpate due to body habitus  Ovaries: unable to palpate due to body habitus.  PSYCHE: normal.    A/P:  Milvia was seen today for pap smear.    Diagnoses and all orders for this visit:    Screening for cervical cancer  -     Pap imaged thin layer screen with HPV - recommended age 30 - 65 years (select HPV order below)    The patient voiced understanding of the information discussed and all questions were answered.     I spent a total of 30  minutes in the care of this pt during today's office visit. This time includes reviewing the patient's chart and prior history, obtaining a history, performing an examination and evaluation and counseling the patient. This time also includes ordering medications or tests necessary in addition to communication to other member's of the patient's health care team. Time spent in documentation and care coordination is included.     Priya TAVERAS, JANI                  "

## 2023-05-25 NOTE — NURSING NOTE
"Milvia Carrasquillo is a 52 year old female patient that presents today in clinic for the following:    Chief Complaint   Patient presents with     pap smear     The patient's allergies and medications were reviewed as noted. A set of vitals were recorded as noted without incident: /77 (BP Location: Right arm, Patient Position: Sitting, Cuff Size: Adult Regular)   Pulse 76   Temp 98.3  F (36.8  C) (Oral)   Ht 1.638 m (5' 4.5\")   Wt 85 kg (187 lb 4.8 oz)   SpO2 95%   BMI 31.65 kg/m  . The patient does not have any other questions for the provider.    Makenna Tang, EMT at 5:02 PM on 5/25/2023.  Primary care clinic: [phone]  "

## 2023-05-31 ENCOUNTER — ANCILLARY PROCEDURE (OUTPATIENT)
Dept: MAMMOGRAPHY | Facility: CLINIC | Age: 53
End: 2023-05-31
Attending: INTERNAL MEDICINE
Payer: COMMERCIAL

## 2023-05-31 DIAGNOSIS — Z12.31 ENCOUNTER FOR SCREENING MAMMOGRAM FOR BREAST CANCER: ICD-10-CM

## 2023-05-31 LAB
BKR LAB AP GYN ADEQUACY: NORMAL
BKR LAB AP GYN INTERPRETATION: NORMAL
BKR LAB AP HPV REFLEX: NORMAL
BKR LAB AP PREVIOUS ABNORMAL: NORMAL
PATH REPORT.COMMENTS IMP SPEC: NORMAL
PATH REPORT.COMMENTS IMP SPEC: NORMAL
PATH REPORT.RELEVANT HX SPEC: NORMAL

## 2023-05-31 PROCEDURE — 77063 BREAST TOMOSYNTHESIS BI: CPT | Performed by: RADIOLOGY

## 2023-05-31 PROCEDURE — 77067 SCR MAMMO BI INCL CAD: CPT | Performed by: RADIOLOGY

## 2023-06-02 LAB
HUMAN PAPILLOMA VIRUS 16 DNA: NEGATIVE
HUMAN PAPILLOMA VIRUS 18 DNA: NEGATIVE
HUMAN PAPILLOMA VIRUS FINAL DIAGNOSIS: NORMAL
HUMAN PAPILLOMA VIRUS OTHER HR: NEGATIVE

## 2023-08-13 ENCOUNTER — HEALTH MAINTENANCE LETTER (OUTPATIENT)
Age: 53
End: 2023-08-13

## 2023-08-15 ENCOUNTER — TRANSFERRED RECORDS (OUTPATIENT)
Dept: MULTI SPECIALTY CLINIC | Facility: CLINIC | Age: 53
End: 2023-08-15

## 2023-08-15 LAB — RETINOPATHY: NORMAL

## 2023-08-26 ENCOUNTER — OFFICE VISIT (OUTPATIENT)
Dept: URGENT CARE | Facility: URGENT CARE | Age: 53
End: 2023-08-26
Payer: COMMERCIAL

## 2023-08-26 VITALS
OXYGEN SATURATION: 97 % | HEART RATE: 68 BPM | DIASTOLIC BLOOD PRESSURE: 73 MMHG | TEMPERATURE: 97.6 F | SYSTOLIC BLOOD PRESSURE: 132 MMHG | WEIGHT: 185.38 LBS | RESPIRATION RATE: 16 BRPM | BODY MASS INDEX: 31.33 KG/M2

## 2023-08-26 DIAGNOSIS — E11.65 TYPE 2 DIABETES MELLITUS WITH HYPERGLYCEMIA, UNSPECIFIED WHETHER LONG TERM INSULIN USE (H): ICD-10-CM

## 2023-08-26 DIAGNOSIS — J30.2 SEASONAL ALLERGIC RHINITIS, UNSPECIFIED TRIGGER: Primary | ICD-10-CM

## 2023-08-26 PROCEDURE — 99213 OFFICE O/P EST LOW 20 MIN: CPT | Performed by: PHYSICIAN ASSISTANT

## 2023-08-26 RX ORDER — LEVOCETIRIZINE DIHYDROCHLORIDE 5 MG/1
5 TABLET, FILM COATED ORAL EVERY EVENING
Qty: 30 TABLET | Refills: 0 | Status: SHIPPED | OUTPATIENT
Start: 2023-08-26 | End: 2023-09-25

## 2023-08-26 RX ORDER — OLOPATADINE HYDROCHLORIDE 1 MG/ML
1 SOLUTION/ DROPS OPHTHALMIC 2 TIMES DAILY
Qty: 1 ML | Refills: 0 | Status: SHIPPED | OUTPATIENT
Start: 2023-08-26 | End: 2023-09-05

## 2023-08-26 RX ORDER — PREDNISONE 10 MG/1
TABLET ORAL
Qty: 30 TABLET | Refills: 0 | Status: SHIPPED | OUTPATIENT
Start: 2023-08-26 | End: 2024-06-14

## 2023-08-26 NOTE — PROGRESS NOTES
Chief Complaint   Patient presents with    Urgent Care     Urgent care visit for allergies.    Allergies     For one week nasal congestion, eyes are burning and she has to rub them frequently. She has taken medicine (Cetirizine-10mg) and eye drops (Visine Allergy Eye drops) but they are not working. She cannot take the Cetirizine frequently due to sleepiness caused by the medicine. No cough. No fever/chills or headaches. No headache but sinus discomfort. No sore throat.               ASSESSMENT:     ICD-10-CM    1. Seasonal allergic rhinitis, unspecified trigger  J30.2 levocetirizine (XYZAL) 5 MG tablet     predniSONE (DELTASONE) 10 MG tablet     olopatadine (PATANOL) 0.1 % ophthalmic solution     Adult Allergy/Asthma Referral      2. Type 2 diabetes mellitus with hyperglycemia, unspecified whether long term insulin use (H)  E11.65             PLAN: Discontinue Zyrtec.  Switch to Xyzal.  Prednisone taper.  Pataday eyedrops.  Referral to see allergy.  Prednisone may temporarily increase her blood sugars.  I have discussed clinical findings with patient.  Side effects of medications discussed.  Symptomatic care is discussed.  I have discussed the possibility of  worsening symptoms and indication to RTC or go to the ER if they occur.  All questions are answered, patient indicates understanding of these issues and is in agreement with plan.   Patient care instructions are discussed/given at the end of visit.     Veda Aguero PA-C      SUBJECTIVE:  52-year-old female with history of seasonal allergies presents for severe allergy type symptoms over the past week.  Used to live in [place] only had allergies in the spring but now since moving to [place] past 5 years has pretty significant allergies in the spring and in the fall.  Has tried Allegra and Zyrtec without relief.  Zyrtec has made her drowsy.  She complains of postnasal drip and headache and watery itchy burning eyes.  No fever or chills.  In the past  had a shot of steroid which significantly helped.  History of diabetes.  Lots of sneezing.      No Known Allergies    Past Medical History:   Diagnosis Date    HTN (hypertension)     Osteoarthritis of left hip        amLODIPine (NORVASC) 10 MG tablet, Take 1 tablet (10 mg) by mouth daily  losartan (COZAAR) 100 MG tablet, Take 1 tablet (100 mg) by mouth daily  metFORMIN (GLUCOPHAGE-XR) 750 MG 24 hr tablet, Take 1 tablet (750 mg) by mouth daily (with dinner)  NEW MED, Visine Allergy Eye Drops  semaglutide (OZEMPIC) 2 MG/3ML pen, Inject 0.25 mg weekly (every 7d) for 4 weeks then increase to 0.5 mg weekly thereafter  acetaminophen (TYLENOL) 325 MG tablet, Take 2 tablets (650 mg) by mouth every 4 hours as needed for other (Patient not taking: Reported on 8/26/2023)  cetirizine (ZYRTEC) 10 MG tablet, Take 1 tablet (10 mg) by mouth daily (Patient not taking: Reported on 8/26/2023)    No current facility-administered medications on file prior to visit.      Social History     Tobacco Use    Smoking status: Never     Passive exposure: Never    Smokeless tobacco: Never   Substance Use Topics    Alcohol use: Yes     Comment: rare       ROS:  CONSTITUTIONAL: Negative for fatigue or fever.  EYES: As above   ENT: As above.  RESP: As above.  CV: Negative for chest pains.  GI: Negative for vomiting.  MUSCULOSKELETAL:  Negative for significant muscle or joint pains.  NEUROLOGIC: Negative for headaches.  SKIN: Negative for rash.  PSYCH: Normal mentation for age.    OBJECTIVE:  /73 (BP Location: Left arm, Patient Position: Sitting, Cuff Size: Adult Regular)   Pulse 68   Temp 97.6  F (36.4  C) (Oral)   Resp 16   Wt 84.1 kg (185 lb 6 oz)   SpO2 97%   Breastfeeding No   BMI 31.33 kg/m    GENERAL APPEARANCE: Healthy, alert and no distress.  EYES:Conjunctiva/sclera moderate injection bilaterally.  Watering.    EARS: No cerumen.   Ear canals w/o erythema.  TM's intact w/o erythema.    SINUSES: No maxillary sinus  tenderness.  NECK: Moving head and neck freely   RESP: Breathing comfortably   NEURO: Awake, alert    SKIN: No rashes        Veda Aguero PA-C

## 2023-09-23 DIAGNOSIS — E11.9 TYPE 2 DIABETES MELLITUS WITHOUT COMPLICATION, WITHOUT LONG-TERM CURRENT USE OF INSULIN (H): ICD-10-CM

## 2023-09-23 DIAGNOSIS — K76.0 FATTY LIVER DISEASE, NONALCOHOLIC: ICD-10-CM

## 2023-09-26 RX ORDER — SEMAGLUTIDE 0.68 MG/ML
0.5 INJECTION, SOLUTION SUBCUTANEOUS
Qty: 3 ML | Refills: 3 | Status: SHIPPED | OUTPATIENT
Start: 2023-09-26 | End: 2024-01-18

## 2023-09-26 NOTE — TELEPHONE ENCOUNTER
semaglutide (OZEMPIC) 2 MG/3ML pen 3 mL 3 5/15/2023     Last Office Visit : 5/15/23  Future Office visit:  none on file    Routing refill request to provider for review/approval because:  Cr abnormal, please review & refill if appropriate.  Creatinine   Date Value Ref Range Status   04/24/2023 1.02 (H) 0.51 - 0.95 mg/dL Final

## 2023-09-26 NOTE — TELEPHONE ENCOUNTER
Pt was last seen in clinic on 5/25/23. Pt last had semaglutide (OZEMPIC) 2 MG/3ML pen  refilled on 9/1/23 for a 42 day supply by PCP. Due for refill. Routed to provider for refill approval.     Per Dr. Koehler note:   A/P) 1) Elevated transaminases and alk phos in setting of HTN, hyperlipidemia, DM2 and obesity. Liver ultrasound findings show mild hepatomegaly with increased echogenicity c/w TELLEZ. Co-morbid DM2 so GLP-1 receptor agonist therapy would be a good choice. Semaglutide has been shown to improve liver histology and decrease fibrosis as well as facilitate weight loss. Cost can be a barrier. Will check fibroscan to determine level of fibrosis present prior to initiation of therapy. Will initiate 0.25 mg weekly for 4 weeks then increase to 0.5 mg weekly thereafter. Will complete prior authorization paperwork as needed for insurance coverage.     CLEVE WRIGHT RN on 9/26/2023 at 3:44 PM

## 2023-12-31 ENCOUNTER — HEALTH MAINTENANCE LETTER (OUTPATIENT)
Age: 53
End: 2023-12-31

## 2024-01-13 DIAGNOSIS — K76.0 FATTY LIVER DISEASE, NONALCOHOLIC: ICD-10-CM

## 2024-01-13 DIAGNOSIS — E11.9 TYPE 2 DIABETES MELLITUS WITHOUT COMPLICATION, WITHOUT LONG-TERM CURRENT USE OF INSULIN (H): ICD-10-CM

## 2024-01-17 NOTE — TELEPHONE ENCOUNTER
OZEMPIC (0.25 OR 0.5 MG/DOSE 2 SOPN       Last Written Prescription Date:  9/26/23  Last Fill Quantity: 3ml ,   # refills: 3   Last Office Visit : 5/15/23  Future Office visit:  none on file    Routing refill request to provider for review/approval because:  A1C due, abn Cr . No appt scheduled    04/24/23       A1C 6.3*     04/24/23       CR 1.02*

## 2024-01-18 RX ORDER — SEMAGLUTIDE 0.68 MG/ML
INJECTION, SOLUTION SUBCUTANEOUS
Qty: 3 ML | Refills: 0 | Status: SHIPPED | OUTPATIENT
Start: 2024-01-18 | End: 2024-02-28

## 2024-01-26 ENCOUNTER — MYC REFILL (OUTPATIENT)
Dept: INTERNAL MEDICINE | Facility: CLINIC | Age: 54
End: 2024-01-26
Payer: COMMERCIAL

## 2024-01-26 DIAGNOSIS — I10 SEVERE HYPERTENSION: ICD-10-CM

## 2024-01-26 DIAGNOSIS — M16.12 OSTEOARTHRITIS OF LEFT HIP, UNSPECIFIED OSTEOARTHRITIS TYPE: ICD-10-CM

## 2024-01-26 RX ORDER — LOSARTAN POTASSIUM 100 MG/1
100 TABLET ORAL DAILY
Qty: 90 TABLET | Refills: 1 | Status: SHIPPED | OUTPATIENT
Start: 2024-01-26 | End: 2024-02-28

## 2024-01-26 RX ORDER — ACETAMINOPHEN 325 MG/1
650 TABLET ORAL EVERY 4 HOURS PRN
Qty: 60 TABLET | Refills: 0 | OUTPATIENT
Start: 2024-01-26

## 2024-01-26 NOTE — TELEPHONE ENCOUNTER
LVD: 5/25/2023  [facility]    Priya Juan, APRN [facility]  Internal Medicine     losartan (COZAAR) 100 MG tablet    GFR Estimate   Date Value Ref Range Status   04/24/2023 65 >60 mL/min/1.73m2 Final     Comment:     eGFR calculated using 2021 CKD-EPI equation.

## 2024-01-26 NOTE — CONFIDENTIAL NOTE
Medication filled for after surgery only. Needs to discuss with PCP for future fills.  Cande Butts RN

## 2024-02-28 ENCOUNTER — MYC REFILL (OUTPATIENT)
Dept: INTERNAL MEDICINE | Facility: CLINIC | Age: 54
End: 2024-02-28
Payer: COMMERCIAL

## 2024-02-28 DIAGNOSIS — I10 SEVERE HYPERTENSION: ICD-10-CM

## 2024-02-28 DIAGNOSIS — K76.0 FATTY LIVER DISEASE, NONALCOHOLIC: ICD-10-CM

## 2024-02-28 DIAGNOSIS — E11.9 TYPE 2 DIABETES MELLITUS WITHOUT COMPLICATION, WITHOUT LONG-TERM CURRENT USE OF INSULIN (H): ICD-10-CM

## 2024-02-29 RX ORDER — LOSARTAN POTASSIUM 100 MG/1
100 TABLET ORAL DAILY
Qty: 90 TABLET | Refills: 1 | Status: SHIPPED | OUTPATIENT
Start: 2024-02-29 | End: 2024-06-04

## 2024-02-29 RX ORDER — SEMAGLUTIDE 0.68 MG/ML
INJECTION, SOLUTION SUBCUTANEOUS
Qty: 3 ML | Refills: 0 | Status: SHIPPED | OUTPATIENT
Start: 2024-02-29 | End: 2024-05-28 | Stop reason: DRUGHIGH

## 2024-02-29 NOTE — TELEPHONE ENCOUNTER
Pharm change  losartan (COZAAR) 100 MG tablet   90 tablet 1 1/26/2024 -- No  Sig - Route: Take 1 tablet (100 mg) by mouth daily - Oral  Prescribing Provider's NPI: [id number]  Ehsan Koehler

## 2024-02-29 NOTE — TELEPHONE ENCOUNTER
semaglutide (OZEMPIC, 0.25 OR 0.5 MG/DOSE,) 2 MG/3ML pen       INJECT 0.5 MG SUBCUTANEOUSLY FOR 7 DAYS  Notes to Pharmacy: Patient needs to see primary provider and have labs for further refills. Please call for an appointment at [phone].    Last Written Prescription Date:  1-18-24  Last Fill Quantity: 3 ml,   # refills: 0  Last Office Visit : 5-25-23  Future Office visit:  none    Routing refill request to provider for review/approval because:  Past due appt, lab: A1c.  Previous norberto MELISSA  Clarify directions

## 2024-03-05 ENCOUNTER — TELEPHONE (OUTPATIENT)
Dept: INTERNAL MEDICINE | Facility: CLINIC | Age: 54
End: 2024-03-05
Payer: COMMERCIAL

## 2024-03-05 DIAGNOSIS — E11.9 TYPE 2 DIABETES MELLITUS WITHOUT COMPLICATION, WITHOUT LONG-TERM CURRENT USE OF INSULIN (H): Primary | ICD-10-CM

## 2024-03-05 NOTE — TELEPHONE ENCOUNTER
[facility] Call Center    Phone Message    May a detailed message be left on voicemail: yes     Reason for Call: Other: Patient calling to schedule labs for A1C per patient,      Action Taken: Message routed to:  [facility] pcc    Travel Screening: Not Applicable

## 2024-03-06 ENCOUNTER — LAB (OUTPATIENT)
Dept: LAB | Facility: CLINIC | Age: 54
End: 2024-03-06
Payer: COMMERCIAL

## 2024-03-06 DIAGNOSIS — E11.9 TYPE 2 DIABETES MELLITUS WITHOUT COMPLICATION, WITHOUT LONG-TERM CURRENT USE OF INSULIN (H): ICD-10-CM

## 2024-03-06 LAB — HBA1C MFR BLD: 6 %

## 2024-03-06 PROCEDURE — 99000 SPECIMEN HANDLING OFFICE-LAB: CPT | Performed by: PATHOLOGY

## 2024-03-06 PROCEDURE — 36415 COLL VENOUS BLD VENIPUNCTURE: CPT | Performed by: PATHOLOGY

## 2024-03-06 PROCEDURE — 83036 HEMOGLOBIN GLYCOSYLATED A1C: CPT | Performed by: INTERNAL MEDICINE

## 2024-03-12 NOTE — TELEPHONE ENCOUNTER
Pt had ozempic 0.5 mg for 4 weeks, can we increase to 1 mg/week?  Rx is pended. Thank you.    Soon-Mi

## 2024-04-17 DIAGNOSIS — I10 SEVERE HYPERTENSION: ICD-10-CM

## 2024-04-24 ENCOUNTER — MYC REFILL (OUTPATIENT)
Dept: INTERNAL MEDICINE | Facility: CLINIC | Age: 54
End: 2024-04-24
Payer: COMMERCIAL

## 2024-04-24 DIAGNOSIS — E11.9 TYPE 2 DIABETES MELLITUS WITHOUT COMPLICATION, WITHOUT LONG-TERM CURRENT USE OF INSULIN (H): ICD-10-CM

## 2024-04-25 NOTE — TELEPHONE ENCOUNTER
amLODIPine (NORVASC) 10 MG tablet 90 tablet 3 4/24/2023 -- No   Sig - Route: Take 1 tablet (10 mg) by mouth daily - Oral     ----------------------  Last Office Visit : 5/25/2023  [facility] [place]     Priya uJan, DARCY UGNTER     Future Office visit:  0  ----------------------      Routing refill request to provider for review/approval because:  Overdue for Cr recheck, last results elevated. Pended for 90 days with reminder to schedule annual appt.    Creatinine   Date Value Ref Range Status   04/24/2023 1.02 (H) 0.51 - 0.95 mg/dL Final     BP Readings from Last 3 Encounters:   08/26/23 132/73   05/25/23 124/77   05/15/23 138/84     Pass/Fail Protocol Criteria:  Calcium Channel Blockers Protocol  [id number]/25/2024 12:51 PM   Protocol Details Normal serum creatinine on file in past 12 months    Blood pressure under 140/90 in past 12 months    Recent (12 mo) or future (30 days) visit within the authorizing provider's specialty    Medication is active on med list    Patient is age 18 or older    No active pregnancy on record    No positive pregnancy test in past 12 months

## 2024-04-26 RX ORDER — AMLODIPINE BESYLATE 10 MG/1
10 TABLET ORAL DAILY
Qty: 90 TABLET | Refills: 0 | Status: SHIPPED | OUTPATIENT
Start: 2024-04-26 | End: 2024-05-30

## 2024-05-01 RX ORDER — SEMAGLUTIDE 1.34 MG/ML
1 INJECTION, SOLUTION SUBCUTANEOUS
Qty: 3 ML | Refills: 0 | OUTPATIENT
Start: 2024-05-01

## 2024-05-01 NOTE — TELEPHONE ENCOUNTER
Semaglutide, 1 MG/DOSE, (OZEMPIC) 4 MG/3ML pen 3 mL 0 4/29/2024 -- No   Sig - Route: Inject 1 mg Subcutaneous every 7 days - Subcutaneous   Sent to pharmacy as: Semaglutide (1 MG/DOSE) 4 MG/3ML Subcutaneous Solution Pen-injector (OZEMPIC)   Class: E-Prescribe   Order: [id number]   E-Prescribing Status: Receipt confirmed by pharmacy (4/29/2024  3:23 PM CDT)     Printout Tracking    External Result Report     Pharmacy    [facility] #[id number] - [place], [place] - [address] AT [facility] [address]

## 2024-05-15 NOTE — TELEPHONE ENCOUNTER
Patient calling stating she received a letter from her insurance stating the medication dose is incorrect. Please call patient to discuss thank you.

## 2024-05-17 NOTE — TELEPHONE ENCOUNTER
Called pharmacy- they were able to fill the 1 mg rx, and insurance covered. Called patient and lvm with - what is exactly her question?    Rhett Cerda RN on 5/17/2024 at 3:13 PM

## 2024-05-23 ENCOUNTER — TELEPHONE (OUTPATIENT)
Dept: INTERNAL MEDICINE | Facility: CLINIC | Age: 54
End: 2024-05-23
Payer: COMMERCIAL

## 2024-05-23 DIAGNOSIS — E11.9 DIABETES MELLITUS, TYPE 2 (H): Primary | ICD-10-CM

## 2024-05-23 NOTE — TELEPHONE ENCOUNTER
I called the pharmacy and confirmed that pt picked up ozempic (prescribed on 4/29/24) on 5/4/24. She may need refill for June supply and I will send the refill today.  I left a detailed message to the pt regarding this matter via .

## 2024-05-23 NOTE — TELEPHONE ENCOUNTER
Pt has been on ozempic 1 mg for 2 months.  Do you want to increase to 2 mg/week? Rx is pended for your review. Thank you.    Soon-Mi

## 2024-05-24 ENCOUNTER — APPOINTMENT (OUTPATIENT)
Dept: INTERPRETER SERVICES | Facility: CLINIC | Age: 54
End: 2024-05-24
Payer: COMMERCIAL

## 2024-05-24 NOTE — TELEPHONE ENCOUNTER
Dr. Koehler    This pa has an upcoming appt with you on 6/17/24.  She has been on ozempic 1 mg for 1 month.  Do you want to increase to 2 mg/week for the next refill? She says she is ready for 2 mg/week and she needs a refill before 6/17/24.    Soon-Mi

## 2024-05-24 NOTE — TELEPHONE ENCOUNTER
Pt needs to follow up with PCP for further refills and ozempic dose adjustment/increase. Last OV was 5/25/23.    Spoke with pt via  and coordinated scheduling.

## 2024-05-29 DIAGNOSIS — I10 SEVERE HYPERTENSION: Primary | ICD-10-CM

## 2024-05-30 ENCOUNTER — MYC REFILL (OUTPATIENT)
Dept: INTERNAL MEDICINE | Facility: CLINIC | Age: 54
End: 2024-05-30
Payer: COMMERCIAL

## 2024-05-30 DIAGNOSIS — I10 SEVERE HYPERTENSION: ICD-10-CM

## 2024-05-30 RX ORDER — AMLODIPINE BESYLATE 10 MG/1
10 TABLET ORAL DAILY
Qty: 60 TABLET | Refills: 0 | Status: SHIPPED | OUTPATIENT
Start: 2024-05-30 | End: 2024-06-17

## 2024-05-30 NOTE — TELEPHONE ENCOUNTER
amLODIPine (NORVASC) 10 MG tablet   Last Written Prescription Date:  4/26/24  Last Fill Quantity: 90,   # refills: 0   [id number] - [place], [place] - [address]   Remaining quantity  sent to requested pharmacy.  [id number] - [facility] [address] AT [facility] [address]

## 2024-06-04 RX ORDER — LOSARTAN POTASSIUM 100 MG/1
100 TABLET ORAL DAILY
Qty: 90 TABLET | Refills: 0 | Status: SHIPPED | OUTPATIENT
Start: 2024-06-04 | End: 2024-06-17

## 2024-06-10 ENCOUNTER — OFFICE VISIT (OUTPATIENT)
Dept: URGENT CARE | Facility: URGENT CARE | Age: 54
End: 2024-06-10
Payer: COMMERCIAL

## 2024-06-10 VITALS
BODY MASS INDEX: 33.82 KG/M2 | OXYGEN SATURATION: 96 % | HEART RATE: 82 BPM | DIASTOLIC BLOOD PRESSURE: 87 MMHG | RESPIRATION RATE: 20 BRPM | WEIGHT: 200.1 LBS | SYSTOLIC BLOOD PRESSURE: 138 MMHG | TEMPERATURE: 98.7 F

## 2024-06-10 DIAGNOSIS — H10.13 ALLERGIC CONJUNCTIVITIS, BILATERAL: ICD-10-CM

## 2024-06-10 DIAGNOSIS — J30.2 SEASONAL ALLERGIES: Primary | ICD-10-CM

## 2024-06-10 PROCEDURE — 99213 OFFICE O/P EST LOW 20 MIN: CPT

## 2024-06-10 RX ORDER — LORATADINE 10 MG/1
10 TABLET ORAL DAILY
Qty: 30 TABLET | Refills: 0 | Status: SHIPPED | OUTPATIENT
Start: 2024-06-10

## 2024-06-10 RX ORDER — FLUTICASONE PROPIONATE 50 MCG
2 SPRAY, SUSPENSION (ML) NASAL DAILY
Qty: 16 G | Refills: 11 | Status: SHIPPED | OUTPATIENT
Start: 2024-06-10

## 2024-06-10 RX ORDER — AZELASTINE HYDROCHLORIDE 0.5 MG/ML
1 SOLUTION/ DROPS OPHTHALMIC 2 TIMES DAILY
Qty: 6 ML | Refills: 0 | Status: SHIPPED | OUTPATIENT
Start: 2024-06-10

## 2024-06-10 NOTE — PATIENT INSTRUCTIONS
Use the eyedrops as prescribed.  Take Claritin and use Flonase for your symptoms.  Follow up with allergy for further evaluation and treatment.

## 2024-06-10 NOTE — PROGRESS NOTES
ASSESSMENT:  (J30.2) Seasonal allergies  (primary encounter diagnosis)  Plan: fluticasone (FLONASE) 50 MCG/ACT nasal spray,         loratadine (CLARITIN) 10 MG tablet, Adult         Allergy/Asthma  Referral    (H10.13) Allergic conjunctivitis, bilateral  Plan: azelastine (OPTIVAR) 0.05 % ophthalmic         solution, Adult Allergy/Asthma          Referral    PLAN:  Seasonal allergies patient instructions discussed and provided in Ugandan.  We discussed using the eyedrops as prescribed.  We also discussed taking Claritin and using Flonase for her symptoms.  Informed her to follow-up with allergy specialist for further evaluation and treatment.  Patient acknowledged her understanding of the above plan.    The use of Dragon/PowerMic dictation services may have been used to construct the content in this note; any grammatical or spelling errors are non-intentional. Please contact the author of this note directly if you are in need of any clarification.      Ad Amaro, DARCY CNP      SUBJECTIVE:   Milvia Carrasquillo is a 53 year old female presenting with a chief complaint of itchy/watery eyes, mild cough and nasal congestion.  Onset of symptoms was 1 week ago.  Course of illness is same.    Patient denies: fever, runny nose, ear pain, sore throat, vomiting, and diarrhea  Treatment measures tried include Zyrtec.  Predisposing factors include seasonal allergies.    ROS:  Negative except noted above.    OBJECTIVE:  /87 (BP Location: Left arm, Patient Position: Sitting, Cuff Size: Adult Regular)   Pulse 82   Temp 98.7  F (37.1  C) (Oral)   Resp 20   Wt 90.8 kg (200 lb 1.6 oz)   SpO2 96%   BMI 33.82 kg/m    GENERAL APPEARANCE: healthy, alert and no distress  EYES: PERRL and bilateral mild conjunctival injection  HENT: ear canals and TM's normal.  Nose and mouth without ulcers, erythema or lesions  NECK: supple, nontender, no lymphadenopathy  RESP: lungs clear to auscultation - no  rales, rhonchi or wheezes  CV: regular rates and rhythm, normal S1 S2, no murmur noted  SKIN: no suspicious lesions or rashes

## 2024-06-14 ENCOUNTER — OFFICE VISIT (OUTPATIENT)
Dept: URGENT CARE | Facility: URGENT CARE | Age: 54
End: 2024-06-14
Payer: COMMERCIAL

## 2024-06-14 VITALS
TEMPERATURE: 98.2 F | BODY MASS INDEX: 34.14 KG/M2 | OXYGEN SATURATION: 97 % | SYSTOLIC BLOOD PRESSURE: 120 MMHG | DIASTOLIC BLOOD PRESSURE: 72 MMHG | HEART RATE: 88 BPM | RESPIRATION RATE: 18 BRPM | WEIGHT: 202 LBS

## 2024-06-14 DIAGNOSIS — J01.90 ACUTE SINUSITIS WITH SYMPTOMS > 10 DAYS: Primary | ICD-10-CM

## 2024-06-14 PROCEDURE — 99213 OFFICE O/P EST LOW 20 MIN: CPT | Performed by: PHYSICIAN ASSISTANT

## 2024-06-14 ASSESSMENT — ENCOUNTER SYMPTOMS
RHINORRHEA: 1
FEVER: 0
WHEEZING: 0
EYE REDNESS: 1
CHILLS: 0
CARDIOVASCULAR NEGATIVE: 1
SINUS PRESSURE: 1
SHORTNESS OF BREATH: 0
VOMITING: 0
COUGH: 1
SINUS PAIN: 1
FATIGUE: 1
NAUSEA: 0
DIARRHEA: 0
HEADACHES: 1
SORE THROAT: 0
PALPITATIONS: 0
EYE ITCHING: 1

## 2024-06-14 ASSESSMENT — VISUAL ACUITY: OU: 1

## 2024-06-14 ASSESSMENT — PAIN SCALES - GENERAL: PAINLEVEL: NO PAIN (0)

## 2024-06-14 NOTE — PROGRESS NOTES
Sebastien Steel is a 53 year old, presenting for the following health issues:  Allergies (Scratchy throat, fatigued, itchiness)    HPI  Acute Illness  Acute illness concerns:   Onset/Duration: 2weeks  Symptoms:  Fever: No  Chills/Sweats: YES  Headache (location?): YES  Sinus Pressure: YES  Conjunctivitis:  Yes  Ear Pain: no  Rhinorrhea: YES  Congestion: YES  Sore Throat: No, itchy throat  Cough: YES - mild  Wheeze: No  Decreased Appetite: No  Nausea: No  Vomiting: No  Diarrhea: No  Dysuria/Freq.: No  Dysuria or Hematuria: No  Fatigue/Achiness: YES  Sick/Strep Exposure: No  Therapies tried and outcome: rest,fluids,allergy meds with minimal relief    Patient Active Problem List   Diagnosis    HTN (hypertension)    Class 1 obesity due to excess calories with serious comorbidity and body mass index (BMI) of 34.0 to 34.9 in adult    Snoring    Diabetes mellitus, type 2 (H)    Hyperlipidemia LDL goal <70    Steatohepatitis, non-alcoholic     Current Outpatient Medications   Medication Sig Dispense Refill    amLODIPine (NORVASC) 10 MG tablet Take 1 tablet (10 mg) by mouth daily **Due for annual visit and labs, please schedule via [facility] or call [phone]** 60 tablet 0    azelastine (OPTIVAR) 0.05 % ophthalmic solution Place 1 drop into both eyes 2 times daily 6 mL 0    fluticasone (FLONASE) 50 MCG/ACT nasal spray Spray 2 sprays into both nostrils daily 16 g 11    loratadine (CLARITIN) 10 MG tablet Take 1 tablet (10 mg) by mouth daily 30 tablet 0    losartan (COZAAR) 100 MG tablet Take 1 tablet (100 mg) by mouth daily 90 tablet 0    metFORMIN (GLUCOPHAGE-XR) 750 MG 24 hr tablet Take 1 tablet (750 mg) by mouth daily (with dinner) 90 tablet 3    Semaglutide, 2 MG/DOSE, (OZEMPIC) 8 MG/3ML pen Inject 2 mg Subcutaneous every 7 days Patient needs to see primary provider and have labs for further refills. Please call for an appointment at [phone]. 3 mL 0     No current facility-administered medications for this  visit.      No Known Allergies    Review of Systems   Constitutional:  Positive for fatigue. Negative for chills and fever.   HENT:  Positive for congestion, rhinorrhea, sinus pressure and sinus pain. Negative for ear pain and sore throat.    Eyes:  Positive for redness and itching.   Respiratory:  Positive for cough. Negative for shortness of breath and wheezing.    Cardiovascular: Negative.  Negative for chest pain, palpitations and leg swelling.   Gastrointestinal:  Negative for diarrhea, nausea and vomiting.   Neurological:  Positive for headaches.   All other systems reviewed and are negative.          Objective    /72 (BP Location: Left arm, Patient Position: Sitting, Cuff Size: Adult Regular)   Pulse 88   Temp 98.2  F (36.8  C) (Oral)   Resp 18   Wt 91.6 kg (202 lb)   SpO2 97%   BMI 34.14 kg/m    Body mass index is 34.14 kg/m .  Physical Exam  Vitals and nursing note reviewed.   Constitutional:       General: She is not in acute distress.     Appearance: Normal appearance. She is well-developed. She is obese. She is not ill-appearing.   HENT:      Head: Normocephalic and atraumatic.      Comments: TMs are intact without any erythema or bulging bilaterally.  Airway is patent.     Nose: Congestion and rhinorrhea present.      Mouth/Throat:      Lips: Pink.      Mouth: Mucous membranes are moist.      Pharynx: Oropharynx is clear. Uvula midline. No pharyngeal swelling, oropharyngeal exudate or posterior oropharyngeal erythema.      Tonsils: No tonsillar exudate or tonsillar abscesses.   Eyes:      General: Lids are normal. Vision grossly intact. Gaze aligned appropriately. No scleral icterus.        Right eye: No foreign body or discharge.         Left eye: No foreign body or discharge.      Extraocular Movements: Extraocular movements intact.      Conjunctiva/sclera:      Right eye: Right conjunctiva is injected.      Left eye: Left conjunctiva is injected.      Pupils: Pupils are equal, round, and  reactive to light.   Neck:      Thyroid: No thyromegaly.   Cardiovascular:      Rate and Rhythm: Normal rate and regular rhythm.      Pulses: Normal pulses.      Heart sounds: Normal heart sounds, S1 normal and S2 normal. No murmur heard.     No friction rub. No gallop.   Pulmonary:      Effort: Pulmonary effort is normal. No accessory muscle usage, respiratory distress or retractions.      Breath sounds: Normal breath sounds and air entry. No stridor. No decreased breath sounds, wheezing, rhonchi or rales.   Musculoskeletal:      Cervical back: Normal range of motion and neck supple.   Lymphadenopathy:      Cervical: No cervical adenopathy.   Skin:     General: Skin is warm and dry.      Nails: There is no clubbing.   Neurological:      Mental Status: She is alert and oriented to person, place, and time.   Psychiatric:         Mood and Affect: Mood normal.         Behavior: Behavior normal.         Thought Content: Thought content normal.         Judgment: Judgment normal.              Assessment/Plan:  Acute sinusitis with symptoms > 10 days:  Will treat with [id number] for sinusitis and take with food/probiotics to minimize GI upset.  Recommend tylenol/ibuprofen prn pain/fever and continue with her allergy meds as well.   Rest, fluids, chicken soup.  Recheck in clinic if symptoms worsen or if symptoms do not improve.    -     amoxicillin-clavulanate (AUGMENTIN) 875-125 MG tablet; Take 1 tablet by mouth 2 times daily for 10 days        Юлия Jacob PA-C

## 2024-06-16 SDOH — HEALTH STABILITY: PHYSICAL HEALTH: ON AVERAGE, HOW MANY MINUTES DO YOU ENGAGE IN EXERCISE AT THIS LEVEL?: PATIENT DECLINED

## 2024-06-16 SDOH — HEALTH STABILITY: PHYSICAL HEALTH: ON AVERAGE, HOW MANY DAYS PER WEEK DO YOU ENGAGE IN MODERATE TO STRENUOUS EXERCISE (LIKE A BRISK WALK)?: 0 DAYS

## 2024-06-16 ASSESSMENT — SOCIAL DETERMINANTS OF HEALTH (SDOH): HOW OFTEN DO YOU GET TOGETHER WITH FRIENDS OR RELATIVES?: ONCE A WEEK

## 2024-06-17 ENCOUNTER — LAB (OUTPATIENT)
Dept: LAB | Facility: CLINIC | Age: 54
End: 2024-06-17
Payer: COMMERCIAL

## 2024-06-17 ENCOUNTER — OFFICE VISIT (OUTPATIENT)
Dept: INTERNAL MEDICINE | Facility: CLINIC | Age: 54
End: 2024-06-17
Payer: COMMERCIAL

## 2024-06-17 VITALS
SYSTOLIC BLOOD PRESSURE: 134 MMHG | BODY MASS INDEX: 33.87 KG/M2 | OXYGEN SATURATION: 95 % | DIASTOLIC BLOOD PRESSURE: 84 MMHG | HEART RATE: 82 BPM | WEIGHT: 200.4 LBS

## 2024-06-17 DIAGNOSIS — E11.9 TYPE 2 DIABETES MELLITUS WITHOUT COMPLICATION, WITHOUT LONG-TERM CURRENT USE OF INSULIN (H): ICD-10-CM

## 2024-06-17 DIAGNOSIS — Z11.59 NEED FOR HEPATITIS C SCREENING TEST: ICD-10-CM

## 2024-06-17 DIAGNOSIS — E11.9 DIABETES MELLITUS, TYPE 2 (H): ICD-10-CM

## 2024-06-17 DIAGNOSIS — Z11.4 SCREENING FOR HIV (HUMAN IMMUNODEFICIENCY VIRUS): ICD-10-CM

## 2024-06-17 DIAGNOSIS — I10 HYPERTENSION, UNSPECIFIED TYPE: ICD-10-CM

## 2024-06-17 DIAGNOSIS — Z11.4 SCREENING FOR HIV (HUMAN IMMUNODEFICIENCY VIRUS): Primary | ICD-10-CM

## 2024-06-17 DIAGNOSIS — I10 HTN (HYPERTENSION): ICD-10-CM

## 2024-06-17 DIAGNOSIS — T78.40XD ALLERGIC REACTION, SUBSEQUENT ENCOUNTER: ICD-10-CM

## 2024-06-17 DIAGNOSIS — Z12.11 SCREEN FOR COLON CANCER: ICD-10-CM

## 2024-06-17 LAB
ANION GAP SERPL CALCULATED.3IONS-SCNC: 14 MMOL/L (ref 7–15)
BUN SERPL-MCNC: 21.1 MG/DL (ref 6–20)
CALCIUM SERPL-MCNC: 10.6 MG/DL (ref 8.6–10)
CHLORIDE SERPL-SCNC: 99 MMOL/L (ref 98–107)
CHOLEST SERPL-MCNC: 275 MG/DL
CREAT SERPL-MCNC: 0.85 MG/DL (ref 0.51–0.95)
CREAT UR-MCNC: 177 MG/DL
DEPRECATED HCO3 PLAS-SCNC: 24 MMOL/L (ref 22–29)
EGFRCR SERPLBLD CKD-EPI 2021: 81 ML/MIN/1.73M2
FASTING STATUS PATIENT QL REPORTED: NO
FASTING STATUS PATIENT QL REPORTED: NO
GLUCOSE SERPL-MCNC: 106 MG/DL (ref 70–99)
HBA1C MFR BLD: 6.4 %
HCV AB SERPL QL IA: NONREACTIVE
HDLC SERPL-MCNC: 61 MG/DL
HIV 1+2 AB+HIV1 P24 AG SERPL QL IA: NONREACTIVE
LDLC SERPL CALC-MCNC: 153 MG/DL
MICROALBUMIN UR-MCNC: 25.3 MG/L
MICROALBUMIN/CREAT UR: 14.29 MG/G CR (ref 0–25)
NONHDLC SERPL-MCNC: 214 MG/DL
POTASSIUM SERPL-SCNC: 4.1 MMOL/L (ref 3.4–5.3)
SODIUM SERPL-SCNC: 137 MMOL/L (ref 135–145)
TRIGL SERPL-MCNC: 304 MG/DL

## 2024-06-17 PROCEDURE — 80061 LIPID PANEL: CPT | Performed by: PATHOLOGY

## 2024-06-17 PROCEDURE — 90471 IMMUNIZATION ADMIN: CPT | Performed by: INTERNAL MEDICINE

## 2024-06-17 PROCEDURE — 99396 PREV VISIT EST AGE 40-64: CPT | Mod: 25 | Performed by: INTERNAL MEDICINE

## 2024-06-17 PROCEDURE — 87389 HIV-1 AG W/HIV-1&-2 AB AG IA: CPT | Performed by: INTERNAL MEDICINE

## 2024-06-17 PROCEDURE — 82570 ASSAY OF URINE CREATININE: CPT | Performed by: INTERNAL MEDICINE

## 2024-06-17 PROCEDURE — 86803 HEPATITIS C AB TEST: CPT | Performed by: INTERNAL MEDICINE

## 2024-06-17 PROCEDURE — 83036 HEMOGLOBIN GLYCOSYLATED A1C: CPT | Performed by: INTERNAL MEDICINE

## 2024-06-17 PROCEDURE — 36415 COLL VENOUS BLD VENIPUNCTURE: CPT | Performed by: PATHOLOGY

## 2024-06-17 PROCEDURE — 90677 PCV20 VACCINE IM: CPT | Performed by: INTERNAL MEDICINE

## 2024-06-17 PROCEDURE — 80048 BASIC METABOLIC PNL TOTAL CA: CPT | Performed by: PATHOLOGY

## 2024-06-17 PROCEDURE — 99000 SPECIMEN HANDLING OFFICE-LAB: CPT | Performed by: PATHOLOGY

## 2024-06-17 RX ORDER — AMLODIPINE BESYLATE 10 MG/1
10 TABLET ORAL DAILY
Qty: 90 TABLET | Refills: 3 | Status: SHIPPED | OUTPATIENT
Start: 2024-06-17

## 2024-06-17 RX ORDER — LOSARTAN POTASSIUM 100 MG/1
100 TABLET ORAL DAILY
Qty: 90 TABLET | Refills: 3 | Status: SHIPPED | OUTPATIENT
Start: 2024-06-17

## 2024-06-17 RX ORDER — METFORMIN HYDROCHLORIDE 750 MG/1
750 TABLET, EXTENDED RELEASE ORAL
Qty: 90 TABLET | Refills: 3 | Status: SHIPPED | OUTPATIENT
Start: 2024-06-17

## 2024-06-17 RX ORDER — METHYLPREDNISOLONE 4 MG
TABLET, DOSE PACK ORAL
Qty: 21 TABLET | Refills: 0 | Status: SHIPPED | OUTPATIENT
Start: 2024-06-17

## 2024-06-17 NOTE — PROGRESS NOTES
Preventive Care Visit  [facility]  Ehsan Koehler MD, Internal Medicine - Pediatrics  Jun 17, 2024      Assessment & Plan     Diabetes mellitus, type 2 (H)  A1C in target range on metformin and semaglutide. Continue. Eye exam and urine albumin ordered. Meds refilled.  - Lipid panel reflex to direct LDL Non-fasting; Future  - Adult Eye  Referral; Future  - Albumin Random Urine Quantitative with Creat Ratio; Future  - HEMOGLOBIN A1C; Future  - Semaglutide, 2 MG/DOSE, (OZEMPIC) 8 MG/3ML pen; Inject 2 mg Subcutaneous every 7 days  - metFORMIN (GLUCOPHAGE-XR) 750 MG 24 hr tablet; Take 1 tablet (750 mg) by mouth daily (with dinner)    PRIMARY CARE Guideline-directed screening as below:  - HIV Screening; Future  - Hepatitis C Screen Reflex to HCV RNA Quant and Genotype; Future  - Fecal colorectal cancer screen FIT - Future (S+30); Future    HTN (hypertension)  BP in acceptable range today on amlodipine and losartan- meds refilled  - BASIC METABOLIC PANEL; Future  - amLODIPine (NORVASC) 10 MG tablet; Take 1 tablet (10 mg) by mouth daily  - losartan (COZAAR) 100 MG tablet; Take 1 tablet (100 mg) by mouth daily    Allergic reaction, subsequent encounter  4 weeks of significant allergy symptoms that haven't responded to claritin, eyedrops and flonase. Discussed risks and benefits of a short steroid taper in setting of DM and she adamantly prefers treatment given impact on her work performance and QoL  - methylPREDNISolone (MEDROL DOSEPAK) 4 MG tablet therapy pack; Follow Package Directions    30 minutes spent on the date of the encounter performing chart review, history and exam, documentation and further activities as noted above.    Claudio Koehler MD  Primary Care Center  [facility]        Counseling  Appropriate preventive services were discussed with this patient, including applicable screening as appropriate for fall prevention, nutrition, physical  activity, Tobacco-use cessation, weight loss and cognition.  Checklist reviewing preventive services available has been given to the patient.  Reviewed patient's diet, addressing concerns and/or questions.       Sebastien Steel is a 53 year old, presenting for the following:  Physical (Excessive allergies for past 3 weeks)        6/17/2024     4:35 PM   Additional Questions   Roomed by KTR        Via the Health Maintenance questionnaire, the patient has reported the following services have been completed -Eye Exam: clinic 2023-08-15, this information has been sent to the abstraction team.    HPI  Ms. Nieto reports significant allergy symptoms over past 4 weeks- itchy eyes, runny nose, intermittent rashes. NO pet exposures. Reports annual symptoms but worse this year. No other changes.         6/16/2024   General Health   How would you rate your overall physical health? Good   Feel stress (tense, anxious, or unable to sleep) To some extent   (!) STRESS CONCERN      6/16/2024   Nutrition   Three or more servings of calcium each day? (!) NO   Diet: Regular (no restrictions)   How many servings of fruit and vegetables per day? (!) 0-1   How many sweetened beverages each day? (!) 2         6/16/2024   Exercise   Days per week of moderate/strenous exercise 0 days   Average minutes spent exercising at this level Patient declined   (!) EXERCISE CONCERN      6/16/2024   Social Factors   Frequency of gathering with friends or relatives Once a week   Worry food won't last until get money to buy more Yes   Food not last or not have enough money for food? No   Do you have housing?  No   Are you worried about losing your housing? No   Lack of transportation? No   Unable to get utilities (heat,electricity)? No   Want help with housing or utility concern? No   (!) FOOD SECURITY CONCERN PRESENT(!) HOUSING CONCERN PRESENT      6/16/2024   Fall Risk   Fallen 2 or more times in the past year? No   Trouble with walking or  balance? No          6/16/2024   Dental   Dentist two times every year? Yes         6/16/2024   TB Screening   Were you born outside of the [place]? No         Today's PHQ-2 Score:       6/17/2024     4:42 PM   PHQ-2 ( 1999 Pfizer)   Q1: Little interest or pleasure in doing things 0   Q2: Feeling down, depressed or hopeless 0   PHQ-2 Score 0   Q1: Little interest or pleasure in doing things Not at all   Q2: Feeling down, depressed or hopeless Not at all   PHQ-2 Score 0           6/16/2024   Substance Use   Alcohol more than 3/day or more than 7/wk No   Do you use any other substances recreationally? No     Social History     Tobacco Use    Smoking status: Never     Passive exposure: Never    Smokeless tobacco: Never   Substance Use Topics    Alcohol use: Yes     Comment: rare    Drug use: Never           5/31/2023   LAST FHS-7 RESULTS   1st degree relative breast or ovarian cancer No   Any relative bilateral breast cancer No   Any male have breast cancer No   Any ONE woman have BOTH breast AND ovarian cancer No   Any woman with breast cancer before 50yrs No   2 or more relatives with breast AND/OR ovarian cancer No   2 or more relatives with breast AND/OR bowel cancer No         6/16/2024   One time HIV Screening   Previous HIV test? No         6/16/2024   STI Screening   New sexual partner(s) since last STI/HIV test? No     History of abnormal Pap smear: No - age 30- 64 PAP with HPV every 5 years recommended        Latest Ref Rng & Units 5/25/2023     5:17 PM   PAP / HPV   PAP  Negative for Intraepithelial Lesion or Malignancy (NILM)    HPV 16 DNA Negative Negative    HPV 18 DNA Negative Negative    Other HR HPV Negative Negative      ASCVD Risk   The ASCVD Risk score (Janiya CARVALHO, et al., 2019) failed to calculate for the following reasons:    Cannot find a previous HDL lab    Cannot find a previous total cholesterol lab    Reviewed and updated as needed this visit by Provider                  Review of  "Systems  Constitutional, HEENT, cardiovascular, pulmonary, gi and gu systems are negative, except as otherwise noted.     Objective    Exam  /84 (BP Location: Right arm, Patient Position: Sitting, Cuff Size: Adult Large)   Pulse 82   Wt 90.9 kg (200 lb 6.4 oz)   SpO2 95%   BMI 33.87 kg/m     Estimated body mass index is 33.87 kg/m  as calculated from the following:    Height as of 5/25/23: 1.638 m (5' 4.5\").    Weight as of this encounter: 90.9 kg (200 lb 6.4 oz).    Physical Exam  GENERAL: alert and no distress  EYES: Eyes grossly normal to inspection, PERRL and conjunctivae and sclerae with injection  HENT: ear canals and TM's normal, nose and mouth without ulcers or lesions  NECK: no adenopathy, no asymmetry, masses, or scars  RESP: lungs clear to auscultation - no rales, rhonchi or wheezes  CV: regular rate and rhythm, normal S1 S2, no S3 or S4, no murmur, click or rub, no peripheral edema  ABDOMEN: soft, nontender, no hepatosplenomegaly, no masses and bowel sounds normal  MS: no gross musculoskeletal defects noted, no edema  SKIN: no suspicious lesions or rashes  NEURO: Normal strength and tone, mentation intact and speech normal  PSYCH: mentation appears normal, affect normal/bright  LYMPH: no cervical, supraclavicular, axillary, or inguinal adenopathy        Signed Electronically by: Ehsan Koehler MD    "

## 2024-06-17 NOTE — COMMUNITY RESOURCES LIST (ENGLISH)
June 17, 2024           YOUR PERSONALIZED LIST OF SERVICES & PROGRAMS           NAVIGATION    Eligibility Screening      [facility] application assistance  [address] [facility] [address] [place] [address] (Distance: 11.2 miles)  Phone: [phone]  Language: English, Latvian, Malawian, Estonian  Fee: Free  Accessibility: Deaf or hard of hearing, Blind accommodation, Ada accessible, Translation services      [facility]  [address] [facility] [address] [place] [address] (Distance: 11.2 miles)  Phone: [phone]  Language: English, Latvian, Malawian, Estonian  Fee: Free  Accessibility: Deaf or hard of hearing, Blind accommodation, Ada accessible, Translation services      [employer] [place] - SNAP (formerly food stamps) Screening and Application help  Phone: [phone]  Website: https://www.Resonant Sensors Inc..org/programs/[place]-food-helpline/  Language: English  Hours: Mon 10:00 AM - 5:00 PM Tue 10:00 AM - 5:00 PM Wed 10:00 AM - 5:00 PM Thu 10:00 AM - 5:00 PM Fri 10:00 AM - 5:00 PM  Fee: Free  Accessibility: Ada accessible, Blind accommodation, Deaf or hard of hearing, Translation services        ASSISTANCE    Nutrition Benefits      [facility] application assistance  [address] [address] (Distance: 11.2 miles)  Phone: [phone]  Language: English, Latvian, Malawian, Estonian  Fee: Free  Accessibility: Deaf or hard of hearing, Blind accommodation, Ada accessible, Translation services      [facility] application assistance  [address] [place] [address] (Distance: 17.2 miles)  Phone: [phone]  Website: https://www.TeePee Games/  Language: English  Fee: Free  Accessibility: Ada accessible      [employer] [place] - SNAP (formerly food stamps) Screening and Application help  Phone: [phone]  Website: https://www.[employer] S[employer] Inc..org/programs/[place]-food-helpline/  Language: English   Hours: Mon 10:00 AM - 5:00 PM Tue 10:00 AM - 5:00 PM Wed 10:00 AM - 5:00 PM Thu 10:00 AM - 5:00 PM Fri 10:00 AM - 5:00 PM  Fee: Free  Accessibility: Ada accessible, Blind accommodation, Deaf or hard of hearing, Translation services    [facility] - Fresh fruits and vegetables  [address] [place] [address] (Distance: 6.3 miles)  Website: https://MovetisCox [place]anson.Southern Dreams/  Language: English  Fee: Free  Accessibility: Translation services, Ada accessible      Food Shelf - Food Pantry  [address] [place] [address] (Distance: 23.0 miles)  Phone: [phone] 911-3305  Website: https://Exhbit/Tabula/need-help  Language: English  Fee: Free  Accessibility: Blind accommodation      [facility] Shelf - [place] [facility] Food Shelf  Phone: [phone]  Website: www.Apprenda  Language: English, Samoan  Hours: Mon 9:00 AM - 3:30 PM Tue 9:00 AM - 6:30 PM Wed 9:00 AM - 3:30 PM Thu 9:00 AM - 12:30 PM Fri 9:00 AM - 12:30 PM Sat 9:00 AM - 12:00 PM  Fee: Free        & SHELTER    Housing      [facility] - Emergency Housing  [address] [place] [address] (Distance: 13.7 miles)  Phone: [phone]  Website: http://www.Box GardenExternautics  Language: English  Fee: Free      [facility]  Phone: [phone]  Website: https://www.FonalityUC Health.Genesant/  Language: English, Hmong, Oromo, Kazakh, Samoan  Hours: Mon 9:00 AM - 5:00 PM Tue 9:00 AM - 5:00 PM Wed 9:00 AM - 5:00 PM Thu 9:00 AM - 5:00 PM Fri 9:00 AM - 5:00 PM  Fee: Insurance  Accessibility: Blind accommodation, Deaf or hard of hearing, Translation services  Transportation Options: Free transportation      [facility] - Housing Stabilization Services  Phone: (415) 271-5298  Website: https://Curaxis Pharmaceutical/Housing-Stabilization.html  Language: English  Hours: Mon 9:00 AM - 5:00 PM Tue 9:00 AM - 5:00 PM Wed 9:00 AM -  5:00 PM Thu 9:00 AM - 5:00 PM Fri 9:00 AM - 5:00 PM  Fee: Insurance  Accessibility: Deaf or hard of hearing, Translation services    Case Management      [facility] - Housing search assistance  [address] [place] [address] (Distance: 11.2 miles)  Phone: [phone]  Language: English  Fee: Free  Accessibility: Ada accessible, Translation services      [facility] ([facility]) - Housing Resource Navigation  [address] [place] [address] (Distance: 20.1 miles)  Phone: [phone]  Website: https://io.org/outreach-services/housing-neighborhoods/  Language: English      [employer] - Housing Stabilization Services  Phone: [phone]) [phone]  Website: https://homebasemn.com/  Language: English  Hours: Mon 8:00 AM - 4:00 PM Tue 8:00 AM - 4:00 PM Wed 8:00 AM - 4:00 PM Thu 8:00 AM - 4:00 PM Fri 8:00 AM - 4:00 PM  Fee: Free  Accessibility: Blind accommodation, Deaf or hard of hearing  Transportation Options: Free transportation    Drop-In Services      [facility] - Warming or cooling [place] [facility]  [address] [place] [address] (Distance: 14.3 miles)  Language: English  Fee: Free      [facility] - Warming or cooling [place] [facility]  [address] [place] [address] (Distance: 7.4 miles)  Language: English  Fee: Free      [facility] POSTAL SERVICE - MAIL SERVICE FOR THE HOMELESS  Phone: [phone]) [phone]  Website: https://www.Profex               IMPORTANT NUMBERS & WEBSITES        Emergency Services  911  .   United Way  211 http://211unitedway.org  .   Poison Control  (593) 700-6877 http://mnpoison.org http://wisconsinpoison.org  .     Suicide and Crisis Lifeline  988 http://988lifeline.org  .   Childhelp National Child Abuse Hotline  781.520.2429 http://Childhelphotline.org   .   National Sexual Assault Hotline  (782) 140-2537 (HOPE) http://Rainn.org   .      National Runaway Safeline  (626) 145-2798 (RUNAWAY) http://Integrated biometricsrunaCloudAptitude.org  .   Pregnancy & Postpartum Support  Call/text 475-379-4611  [place]: http://ppsupportmn.org  [place]: http://psichapters.com/wi  .   Substance Abuse National Helpline ([facility])  970-861-HELP (0217) http://Findtreatment.gov   .                DISCLAIMER: These resources have been generated via the [employer] Platform. [employer] does not endorse any service providers mentioned in this resource list. [employer] does not guarantee that the services mentioned in this resource list will be available to you or will improve your health or wellness.    [facility]

## 2024-06-17 NOTE — COMMUNITY RESOURCES LIST (PATIENT PREFERRED LANGUAGE)
June 17, 2024           TU LISTA PERSONALIZADA DE SERVICIOS y PROGRAMAS           ÓN DE BENEFICIOS    ón de elegibilidad para beneficios      [facility] application assistance  [address] [place] [id number] (Distancia: 11.2 millas)  Teléfono: [phone]  Idioma: mino Gee, jorge alberto, somalí  Tarifa: [place]  Accesibilidad: Personas sordas o con problemas de audición, Alojamiento para personas ciegas, Adaptado accesible, Servicios de traducción      [facility]  [address] [place] [address] (Distancia: 11.2 millas)  Teléfono: [phone]  Idioma: mino Gee, jorge alberto, somalí  Tarifa: [place]  Accesibilidad: Personas sordas o con problemas de audición, Alojamiento para personas ciegas, Adaptado accesible, Servicios de traducción      [employer] [place] - SNAP (formerly food stamps) Screening and Application help  Teléfono: [phone]  Sit web: https://www.hungersolutions.org/programs/mn-food-helpline/  Idioma: Gerard  Horas: Bryn 10:00 a. m. - 5:00 p. m. Mar 10:00 a. m. - 5:00 p. m. Mié 10:00 a. m. - 5:00 p. m. Jue 10:00 a. m. - 5:00 p. m. Vie 10:00 a. m. - 5:00 p. m.  Tarifa: [place]  Accesibilidad: Ada accesible, Alojamiento para personas ciegas, Sordos o con problemas de audición, Servicios de traducción        ALIMENTARIA    beneficios nutricionales      [facility] application assistance  [address] [place] [address] (Distancia: 11.2 millas)  Teléfono: [phone]  Idioma: mino Gee, lisa azul: Kyle  Accesibilidad: Personas sordas o con problemas de audición, Alojamiento para personas ciegas, Adaptado accesible, Servicios de traducción      [facility] application assistance  [address] [place] [address] (Distancia: 17.2 millas)  Teléfono: [phone]  Sitio web: https://www.AgLocal/  Idioma: Gerard Ross: Kyle   Accesibilidad: Ada accesible      [employer] [place] - SNAP (formerly food stamps) Screening and Application help  Teléfono: [phone]  Sitio web: https://www.Agilis SystemsPressglueions.org/programs/mn-food-helpline/  Idioma: Gerard Drake: Bryn 10:00 a. m. - 5:00 p. m. Mar 10:00 a. m. - 5:00 p. m. Mié 10:00 a. m. - 5:00 p. m. Jue 10:00 a. m. - 5:00 p. m. Vie 10:00 a. m. - 5:00 p. m.  Tarifa: [place]  Accesibilidad: Ada accesible, Alojamiento para personas ciegas, Sordos o con problemas de audición, Servicios de traducción    [facility] - Fresh fruits and vegetables  [address] [place] [address] (Distancia: 6.3 millas)  Sitio web: https://SkeedCodaMation/  Idioma: Gerard Ross: [place]  Accesibilidad: Servicios de traducción, Ada accesible      Food Shelf - Food Pantry  [address] [place] [address] (Distancia: 23.0 millas)  Teléfono: [phone]  Sitio web: https://International Pet Grooming Academy/Park.com/need-help  Idioma: Gerard Ross: [place]  Accesibilidad: Alojamiento para ciegos      [facility] Food Shelf - [place] [facility] Food Shelf  Teléfono: [phone]  Sitio web: www.Intelomed  Idioma: Sada Drake: Bryn 9:00 a. m. - 3:30 p. m. Mar 9:00 a. m. - 6:30 p. m. Mié 9:00 a. m. - 3:30 p. m. Jue 9:00 a. m. - 12:30 p. m. Vie 9:00 a. m. - 12:30 p. m. Sáb 9:00 a. m. - 12:00 p. m.  Tarifa: [place]        Y SANJUANA    de emergencia      [facility] - Emergency Housing  [address] [facility] [address] [place] [address] (Distancia: 13.7 millas)  Teléfono: [phone]  Sitio web: http://www.ReqlutMercyhealth Walworth Hospital and Medical CenterLocoX.com  Idioma: Gerard Ross: [facility]  Teléfono: [phone]  Sitio web: https://www.Cardiome PharmaGrant HospitalPlum District/  Idioma: augustus Gee, jethro, lisa, mino  Horas: Bryn 9:00 a. m. - 5:00 p. m. Mar 9:00 a. m. - 5:00 p. m. Mié 9:00 a. m. - 5:00 p. m. Jue 9:00 a. m. - 5:00 p.  m. Vie 9:00 a. m. - 5:00 p. m.  Tarifa: [place]  Accesibilidad: Alojamiento para personas ciegas, Sordos o con problemas de audición, Servicios de traducción  Opciones de transporte: [facility] - Housing Stabilization Services  Teléfono: [phone]  Sitio web: https://NewsBasis/Housing-Stabilization.html  Idioma: Gerard Drake: Bryn 9:00 a. m. - 5:00 p. m. Mar 9:00 a. m. - 5:00 p. m. Mié 9:00 a. m. - 5:00 p. m. Jue 9:00 a. m. - 5:00 p. m. Vie 9:00 a. m. - 5:00 p. m.  Tarifa: Seguro  Accesibilidad: Sordos o con problemas de audición, Servicios de traducción    ón de casos de [facility] - Housing search assistance  [address] [place] [place] [address] (Distancia: 11.2 millas)  Teléfono: [phone]  Idioma: Gerard Ross: [place]  Accesibilidad: Ada accesible, Servicios de traducción      [facility] ([facility]) - Housing Resource Navigation  [address] [place] [address] [address] [place] [address] (Distancia: 20.1 millas)  Teléfono: [phone]  Sitio web: https://iocp.org/outreach-services/housing-neighborhoods/  Idioma: Gerard      [employer] - Housing Stabilization Services  Teléfono: [phone]  Sitio web: https://Nubleer Media/  Idioma: Gerard Drake: Bryn 8:00 a. m. - 4:00 p. m. Mar 8:00 a. m. - 4:00 p. m. Mié 8:00 a. m. - 4:00 p. m. Jue 8:00 a. m. - 4:00 p. m. Vie 8:00 a. m. - 4:00 p. m.  Cody: Kyle  Accesibilidad: Alojamiento para ciegos, sordos o con problemas de audición  Opciones de transporte: Transporte gratuito    sin christal previa para personas sin [facility] Warming or cooling center - [facility]  [address] [address] (Distancia: 14.3 millas)  Idioma: Gerard  Tarhugo: [facility] Warming or cooling [place] - [facility]  [address] [address] (Distancia: 7.4  edith)  Idioma: Inglés  Tarhugo: [facility] POSTAL SERVICE - MAIL SERVICE FOR THE HOMELESS  Teléfono: [phone]  Sitio web: https://www.SpineGuards.com               NÚMEROS Y SITIOS WEB IMPORTANTES        Servicios de emergencia  911  .   La vía unida  211 http://211unMediVision.org  .   Control de veneno  (640) 855-1310 http://mnpoison.org http://wisconsinpoison.org  .     Salvavidas para el suicidio y las crisis  988http://988lifeline.org  .   Línea directa nacional de abuso infantil Childhelp  331.340.1848 http://Childhelphotline.org   .   Línea directa nacional de agresión sexual  (941) 799-9853 (BRISA) http://Rainn.org   .     Línea Nacional de Seguridad para Fugitivos  (488) 248-6355 (FUGA) http://1800runaMotivity Labs.TrillTip  .   Apoyo lissy el embarazo y el posparto  Llame o envíe un mensaje de texto al 869-433-3343 [place]: http://ppsupportmn.org [place]: http://BackerKit.com/wi  .   Línea de ayuda nacional para el abuso de sustancias ([facility])  070-800-OBFW (5376) http://Findtreatment.gov   .                DESCARGO DE RESPONSABILIDAD: Estos recursos se burns generado a través de la plataforma [employer]. [employer] no respalda a ningún proveedor de servicios mencionado en esta lista de recursos. [employer] no garantiza que los servicios mencionados en esta lista de recursos estén disponibles para usted o mejoren winter jeanine o bienestar.    [facility]

## 2024-06-21 PROCEDURE — 99000 SPECIMEN HANDLING OFFICE-LAB: CPT | Performed by: PATHOLOGY

## 2024-06-21 PROCEDURE — 82274 ASSAY TEST FOR BLOOD FECAL: CPT | Performed by: INTERNAL MEDICINE

## 2024-06-25 LAB — HEMOCCULT STL QL IA: NEGATIVE

## 2024-07-28 ENCOUNTER — HEALTH MAINTENANCE LETTER (OUTPATIENT)
Age: 54
End: 2024-07-28

## 2024-10-05 ENCOUNTER — HEALTH MAINTENANCE LETTER (OUTPATIENT)
Age: 54
End: 2024-10-05

## 2024-10-17 ENCOUNTER — TELEPHONE (OUTPATIENT)
Dept: OPHTHALMOLOGY | Facility: CLINIC | Age: 54
End: 2024-10-17
Payer: COMMERCIAL

## 2024-10-17 NOTE — TELEPHONE ENCOUNTER
LVM reminder of appointment on 10/19/24. Provided [facility] Clinic for any scheduling conflicts. -MS 10/17/24 -Via  call

## 2024-10-19 ENCOUNTER — OFFICE VISIT (OUTPATIENT)
Dept: OPHTHALMOLOGY | Facility: CLINIC | Age: 54
End: 2024-10-19
Attending: INTERNAL MEDICINE
Payer: COMMERCIAL

## 2024-10-19 DIAGNOSIS — E11.9 CONTROLLED TYPE 2 DIABETES MELLITUS WITHOUT COMPLICATION, WITHOUT LONG-TERM CURRENT USE OF INSULIN (H): Primary | ICD-10-CM

## 2024-10-19 DIAGNOSIS — H35.033 HYPERTENSIVE RETINOPATHY OF BOTH EYES: ICD-10-CM

## 2024-10-19 DIAGNOSIS — H04.123 DRY EYE SYNDROME OF BOTH EYES: ICD-10-CM

## 2024-10-19 PROCEDURE — 92014 COMPRE OPH EXAM EST PT 1/>: CPT | Performed by: STUDENT IN AN ORGANIZED HEALTH CARE EDUCATION/TRAINING PROGRAM

## 2024-10-19 PROCEDURE — 92015 DETERMINE REFRACTIVE STATE: CPT | Performed by: STUDENT IN AN ORGANIZED HEALTH CARE EDUCATION/TRAINING PROGRAM

## 2024-10-19 ASSESSMENT — REFRACTION_MANIFEST
OD_CYLINDER: SPHERE
OD_ADD: +2.50
OS_SPHERE: -1.75
OS_ADD: +2.50
OS_AXIS: 175
OS_CYLINDER: +0.50
OD_SPHERE: -1.00

## 2024-10-19 ASSESSMENT — CONF VISUAL FIELD
OD_SUPERIOR_NASAL_RESTRICTION: 0
OD_SUPERIOR_TEMPORAL_RESTRICTION: 0
OD_INFERIOR_TEMPORAL_RESTRICTION: 0
OS_SUPERIOR_TEMPORAL_RESTRICTION: 0
OD_INFERIOR_NASAL_RESTRICTION: 0
OS_NORMAL: 1
OD_NORMAL: 1
METHOD: COUNTING FINGERS
OS_INFERIOR_TEMPORAL_RESTRICTION: 0
OS_SUPERIOR_NASAL_RESTRICTION: 0
OS_INFERIOR_NASAL_RESTRICTION: 0

## 2024-10-19 ASSESSMENT — REFRACTION_WEARINGRX
OD_ADD: +2.00
OS_ADD: +2.00
OS_CYLINDER: +0.50
OS_SPHERE: -1.50
OD_SPHERE: -1.00
OD_CYLINDER: +0.25
OS_AXIS: 175
OD_AXIS: 056

## 2024-10-19 ASSESSMENT — TONOMETRY
OS_IOP_MMHG: 17
IOP_METHOD: ICARE
OD_IOP_MMHG: 16

## 2024-10-19 ASSESSMENT — EXTERNAL EXAM - LEFT EYE: OS_EXAM: NORMAL

## 2024-10-19 ASSESSMENT — EXTERNAL EXAM - RIGHT EYE: OD_EXAM: NORMAL

## 2024-10-19 ASSESSMENT — VISUAL ACUITY
OS_CC: 20/25
OD_CC: 20/20
METHOD: SNELLEN - LINEAR
CORRECTION_TYPE: GLASSES

## 2024-10-19 ASSESSMENT — CUP TO DISC RATIO
OD_RATIO: 0.3
OS_RATIO: 0.3

## 2024-10-19 ASSESSMENT — SLIT LAMP EXAM - LIDS
COMMENTS: 1+ MEIBOMIAN GLAND DYSFUNCTION
COMMENTS: 1+ MEIBOMIAN GLAND DYSFUNCTION

## 2024-10-19 NOTE — PROGRESS NOTES
HPI: Milvia Carrasquillo is a 53 year old female comes for evaluation of diabetes.     Lab Results   Component Value Date    A1C 6.4 06/17/2024    A1C 6.0 03/06/2024    A1C 6.3 04/24/2023    A1C 6.6 08/22/2022    A1C 7.3 05/05/2022       Current ocular medications: None  Prior ocular medications: None    Ocular history:   None    Medical history:  Type II diabetes diagnosed in 2022 currently on ozempic and metformin  HTN currently on losartan and amlodipine    Review of systems: Does not report fatigue, weight loss, fevers, chills, night sweats, frequent headaches, seizure, fainting, numbness/tingling, weakness, oral ulcers, genital ulcers, recurrent nosebleeds, sinusitis, hearing loss, tinnitus, chronic cough, chest pain, shortness of breath, skin rash, tick bite, easy bruising, easy bleeding, joint pain/stiffness, back pain, recurrent diarrhea, bloody stools, bloody urine.    Family history:   No FH    Social history:   Non smoker  Alcohol occasionally   No drugs      Impression/Plan:  Type II diabetes without diabetic retinopathy each eye  No signs of diabetic retinopathy on DFE   Last A1c 6.4 6/17/2024  Discussed with patient about the importance of good glycemic control  Hypertensive retinopathy   Vascular attenuation  Discussed with patient about the importance of good BP control   Bergmeister papilla right eye   Monitor  Dry eye both eyes  Lid scrubs  Warm compresses  Artificial tears  5. Myopia both eyes   a. Glasses prescription provided       Return in about 1 year (around 10/19/2025) for DFE, Annual Visit.     ATTESTATION     Attending Physician Attestation:      Complete documentation of historical and exam elements from today's encounter can be found in the full encounter summary report (not reduplicated in this progress note).  I personally obtained the chief complaint(s) and history of present illness.  I confirmed and edited as necessary the review of systems, past medical/surgical history,  Mother Lula returned Loulou's call,   Please call her back.   family history, social history, and examination findings as documented by others; and I examined the patient myself.  I personally reviewed the relevant tests, images, and reports as documented above.  I formulated and edited as necessary the assessment and plan and discussed the findings and management plan with the patient and family    Oscar Benjamin MD  PGY-6 Vitreo-retina surgery Fellow  Department of Ophthalmology   [facility]

## 2024-11-18 ENCOUNTER — OFFICE VISIT (OUTPATIENT)
Dept: INTERNAL MEDICINE | Facility: CLINIC | Age: 54
End: 2024-11-18
Payer: COMMERCIAL

## 2024-11-18 ENCOUNTER — LAB (OUTPATIENT)
Dept: LAB | Facility: CLINIC | Age: 54
End: 2024-11-18
Payer: COMMERCIAL

## 2024-11-18 VITALS
OXYGEN SATURATION: 95 % | TEMPERATURE: 97.5 F | BODY MASS INDEX: 33.99 KG/M2 | HEART RATE: 81 BPM | DIASTOLIC BLOOD PRESSURE: 85 MMHG | WEIGHT: 199.1 LBS | RESPIRATION RATE: 16 BRPM | SYSTOLIC BLOOD PRESSURE: 139 MMHG | HEIGHT: 64 IN

## 2024-11-18 DIAGNOSIS — K76.0 NAFLD (NONALCOHOLIC FATTY LIVER DISEASE): ICD-10-CM

## 2024-11-18 DIAGNOSIS — E11.9 TYPE 2 DIABETES MELLITUS WITHOUT COMPLICATION, WITHOUT LONG-TERM CURRENT USE OF INSULIN (H): ICD-10-CM

## 2024-11-18 DIAGNOSIS — N93.9 ABNORMAL UTERINE BLEEDING: ICD-10-CM

## 2024-11-18 DIAGNOSIS — E83.52 HYPERCALCEMIA: ICD-10-CM

## 2024-11-18 DIAGNOSIS — E11.9 TYPE 2 DIABETES MELLITUS WITHOUT COMPLICATION, WITHOUT LONG-TERM CURRENT USE OF INSULIN (H): Primary | ICD-10-CM

## 2024-11-18 LAB
ALBUMIN SERPL BCG-MCNC: 4.6 G/DL (ref 3.5–5.2)
ALP SERPL-CCNC: 259 U/L (ref 40–150)
ALT SERPL W P-5'-P-CCNC: 64 U/L (ref 0–50)
ANION GAP SERPL CALCULATED.3IONS-SCNC: 12 MMOL/L (ref 7–15)
AST SERPL W P-5'-P-CCNC: 33 U/L (ref 0–45)
BILIRUB SERPL-MCNC: 0.3 MG/DL
BUN SERPL-MCNC: 20.8 MG/DL (ref 6–20)
CALCIUM SERPL-MCNC: 9.6 MG/DL (ref 8.8–10.4)
CHLORIDE SERPL-SCNC: 102 MMOL/L (ref 98–107)
CREAT SERPL-MCNC: 0.93 MG/DL (ref 0.51–0.95)
EGFRCR SERPLBLD CKD-EPI 2021: 72 ML/MIN/1.73M2
ERYTHROCYTE [DISTWIDTH] IN BLOOD BY AUTOMATED COUNT: 13.5 % (ref 10–15)
EST. AVERAGE GLUCOSE BLD GHB EST-MCNC: 126 MG/DL
GLUCOSE SERPL-MCNC: 85 MG/DL (ref 70–99)
HBA1C MFR BLD: 6 %
HCG SERPL QL: NEGATIVE
HCO3 SERPL-SCNC: 23 MMOL/L (ref 22–29)
HCT VFR BLD AUTO: 44 % (ref 35–47)
HGB BLD-MCNC: 14.7 G/DL (ref 11.7–15.7)
INR PPP: 0.95 (ref 0.85–1.15)
MCH RBC QN AUTO: 28.2 PG (ref 26.5–33)
MCHC RBC AUTO-ENTMCNC: 33.4 G/DL (ref 31.5–36.5)
MCV RBC AUTO: 85 FL (ref 78–100)
PLATELET # BLD AUTO: 282 10E3/UL (ref 150–450)
POTASSIUM SERPL-SCNC: 4 MMOL/L (ref 3.4–5.3)
PROT SERPL-MCNC: 8.3 G/DL (ref 6.4–8.3)
PTH-INTACT SERPL-MCNC: 39 PG/ML (ref 15–65)
RBC # BLD AUTO: 5.21 10E6/UL (ref 3.8–5.2)
SODIUM SERPL-SCNC: 137 MMOL/L (ref 135–145)
TSH SERPL DL<=0.005 MIU/L-ACNC: 1.51 UIU/ML (ref 0.3–4.2)
WBC # BLD AUTO: 10.7 10E3/UL (ref 4–11)

## 2024-11-18 PROCEDURE — 83036 HEMOGLOBIN GLYCOSYLATED A1C: CPT | Performed by: INTERNAL MEDICINE

## 2024-11-18 PROCEDURE — 82306 VITAMIN D 25 HYDROXY: CPT | Performed by: INTERNAL MEDICINE

## 2024-11-18 PROCEDURE — 84703 CHORIONIC GONADOTROPIN ASSAY: CPT | Performed by: PATHOLOGY

## 2024-11-18 PROCEDURE — 85610 PROTHROMBIN TIME: CPT | Performed by: PATHOLOGY

## 2024-11-18 PROCEDURE — 84443 ASSAY THYROID STIM HORMONE: CPT | Performed by: PATHOLOGY

## 2024-11-18 PROCEDURE — 83001 ASSAY OF GONADOTROPIN (FSH): CPT | Performed by: INTERNAL MEDICINE

## 2024-11-18 PROCEDURE — 36415 COLL VENOUS BLD VENIPUNCTURE: CPT | Performed by: PATHOLOGY

## 2024-11-18 PROCEDURE — 83970 ASSAY OF PARATHORMONE: CPT | Performed by: PATHOLOGY

## 2024-11-18 PROCEDURE — 85027 COMPLETE CBC AUTOMATED: CPT | Performed by: PATHOLOGY

## 2024-11-18 PROCEDURE — 99000 SPECIMEN HANDLING OFFICE-LAB: CPT | Performed by: PATHOLOGY

## 2024-11-18 PROCEDURE — 80053 COMPREHEN METABOLIC PANEL: CPT | Performed by: PATHOLOGY

## 2024-11-18 NOTE — PROGRESS NOTES
"  Assessment & Plan     Abnormal uterine bleeding  Ms. Nieto reports 8 weeks of vaginal bleeding that stopped 3d ago. 2 pads/d. Had previously gone 8 months without a period. Some hot flushes. No fatigue. Likely transitional menopause but need to assess for other etiologies given presentation.    - US Pelvic Complete with Transvaginal; Future  - Follicle stimulating hormone; Future  - TSH with free T4 reflex; Future  - CBC with platelets; Future  - Beta HcG testing  - INR    Hypercalcemia  Repeat calcium and assess for hyperparathyroidism and other etiologies.   - Ionized Calcium; Future  - Parathyroid Hormone Intact; Future  - Vitamin D Deficiency; Future    Diabetes mellitus, type 2 (H)  Stable but not checking sugars.   - HEMOGLOBIN A1C; Future    NAFLD  Reassess LFTs and INR.   Ongoing diet/exercise to decrease weight. Alcohol abstinence.     30 minutes spent on the date of the encounter performing chart review, history and exam, documentation and further activities as noted above.    Claudio Koehler MD  Primary Care Center  [facility]    BMI  Estimated body mass index is 33.66 kg/m  as calculated from the following:    Height as of this encounter: 1.638 m (5' 4.49\").    Weight as of this encounter: 90.3 kg (199 lb 1.6 oz).     Return in about 8 weeks (around 1/13/2025).    Sebastien Steel is a 54 year old, presenting for the following health issues:  Follow Up (Little vaginal bleeding for the last 2 months ago stopped 3 days ago, more tired, more hungry )      11/18/2024     4:19 PM   Additional Questions   Roomed by [facility] EMT     History of Present Illness       Reason for visit:  Vaginal bleeding   She is taking medications regularly.     Review of Systems  Constitutional, HEENT, cardiovascular, pulmonary, gi and gu systems are negative, except as otherwise noted.      Objective    /85 (BP Location: Right arm, Patient Position: Sitting, Cuff Size: Adult Large)   Pulse 81   Temp 97.5  F (36.4 " " C) (Oral)   Resp 16   Ht 1.638 m (5' 4.49\")   Wt 90.3 kg (199 lb 1.6 oz)   SpO2 95%   BMI 33.66 kg/m    Body mass index is 33.66 kg/m .  Physical Exam   GENERAL: alert and no distress  NECK: no adenopathy, no asymmetry, masses, or scars  RESP: lungs clear to auscultation - no rales, rhonchi or wheezes  CV: regular rate and rhythm, normal S1 S2, no S3 or S4, no murmur, click or rub, no peripheral edema  ABDOMEN: soft, nontender, no hepatosplenomegaly, no masses and bowel sounds normal  MS: no gross musculoskeletal defects noted, no edema    Lab on 06/17/2024   Component Date Value Ref Range Status    Cholesterol 06/17/2024 275 (H)  <200 mg/dL Final    Triglycerides 06/17/2024 304 (H)  <150 mg/dL Final    Direct Measure HDL 06/17/2024 61  >=50 mg/dL Final    LDL Cholesterol Calculated 06/17/2024 153 (H)  <=100 mg/dL Final    Non HDL Cholesterol 06/17/2024 214 (H)  <130 mg/dL Final    Patient Fasting > 8hrs? 06/17/2024 No   Final    HIV Antigen Antibody Combo 06/17/2024 Nonreactive  Nonreactive Final    Negative HIV-1 p24 antigen and HIV-1/2 antibody screening test results usually indicate the absence of HIV-1 and HIV-2 infection. However, such negative results do not rule-out acute HIV infection.  If acute HIV-1 or HIV-2 infection is suspected, detection of HIV-1 or HIV-2 RNA  is recommended.     Hepatitis C Antibody 06/17/2024 Nonreactive  Nonreactive Final    A nonreactive screening test result does not exclude the possibility of exposure to or infection with HCV. Nonreactive screening test results in individuals with prior exposure to HCV may be due to antibody levels below the limit of detection of this assay or lack of reactivity to the HCV antigens used in this assay. Patients with recent HCV infections (<3 months from time of exposure) may have false-negative HCV antibody results due to the time needed for seroconversion (average of 8 to 9 weeks).    Sodium 06/17/2024 137  135 - 145 mmol/L Final    " Reference intervals for this test were updated on 09/26/2023 to more accurately reflect our healthy population. There may be differences in the flagging of prior results with similar values performed with this method. Interpretation of those prior results can be made in the context of the updated reference intervals.     Potassium 06/17/2024 4.1  3.4 - 5.3 mmol/L Final    Chloride 06/17/2024 99  98 - 107 mmol/L Final    Carbon Dioxide (CO2) 06/17/2024 24  22 - 29 mmol/L Final    Anion Gap 06/17/2024 14  7 - 15 mmol/L Final    Urea Nitrogen 06/17/2024 21.1 (H)  6.0 - 20.0 mg/dL Final    Creatinine 06/17/2024 0.85  0.51 - 0.95 mg/dL Final    GFR Estimate 06/17/2024 81  >60 mL/min/1.73m2 Final    eGFR calculated using 2021 CKD-EPI equation.    Calcium 06/17/2024 10.6 (H)  8.6 - 10.0 mg/dL Final    Glucose 06/17/2024 106 (H)  70 - 99 mg/dL Final    Patient Fasting > 8hrs? 06/17/2024 No   Final    Creatinine Urine mg/dL 06/17/2024 177.0  mg/dL Final    The reference ranges have not been established in urine creatinine. The results should be integrated into the clinical context for interpretation.    Albumin Urine mg/L 06/17/2024 25.3  mg/L Final    The reference ranges have not been established in urine albumin. The results should be integrated into the clinical context for interpretation.    Albumin Urine mg/g Cr 06/17/2024 14.29  0.00 - 25.00 mg/g Cr Final    Microalbuminuria is defined as an albumin:creatinine ratio of 17 to 299 for males and 25 to 299 for females. A ratio of albumin:creatinine of 300 or higher is indicative of overt proteinuria.  Due to biologic variability, positive results should be confirmed by a second, first-morning random or 24-hour timed urine specimen. If there is discrepancy, a third specimen is recommended. When 2 out of 3 results are in the microalbuminuria range, this is evidence for incipient nephropathy and warrants increased efforts at glucose control, blood pressure control, and  institution of therapy with an angiotensin-converting-enzyme (ACE) inhibitor (if the patient can tolerate it).      Occult Blood Screen FIT 06/21/2024 Negative  Negative Final    Hemoglobin A1C 06/17/2024 6.4 (H)  <5.7 % Final    Normal <5.7%   Prediabetes 5.7-6.4%    Diabetes 6.5% or higher     Note: Adopted from ADA consensus guidelines.           Signed Electronically by: Ehsan Koehler MD

## 2024-11-19 DIAGNOSIS — K76.0 NAFLD (NONALCOHOLIC FATTY LIVER DISEASE): Primary | ICD-10-CM

## 2024-11-19 LAB
FSH SERPL IRP2-ACNC: 51.9 MIU/ML
VIT D+METAB SERPL-MCNC: 21 NG/ML (ref 20–50)

## 2024-11-27 ENCOUNTER — MYC MEDICAL ADVICE (OUTPATIENT)
Dept: INTERNAL MEDICINE | Facility: CLINIC | Age: 54
End: 2024-11-27

## 2024-11-27 NOTE — TELEPHONE ENCOUNTER
Left Voicemail (1st Attempt) and Sent Mychart (1st Attempt) for the patient to call back and schedule the following:    Appointment type: Return   Provider: PCP  Return date: Mid Jan 2025  Specialty phone number: [phone]  Additonal Notes: per check out - Return in about 8 weeks (around 1/13/2025

## 2024-12-02 DIAGNOSIS — R93.89 ENDOMETRIAL THICKENING ON ULTRASOUND: ICD-10-CM

## 2024-12-02 DIAGNOSIS — N93.9 ABNORMAL UTERINE BLEEDING: Primary | ICD-10-CM

## 2024-12-05 ENCOUNTER — TELEPHONE (OUTPATIENT)
Dept: OBGYN | Facility: CLINIC | Age: 54
End: 2024-12-05
Payer: COMMERCIAL

## 2024-12-10 DIAGNOSIS — E11.9 TYPE 2 DIABETES MELLITUS WITHOUT COMPLICATION, WITHOUT LONG-TERM CURRENT USE OF INSULIN (H): Primary | ICD-10-CM

## 2024-12-15 NOTE — TELEPHONE ENCOUNTER
Semaglutide, 2 MG/DOSE, (OZEMPIC) 8 MG/3ML    Last Written Prescription Date:  6/17/24  Last Fill Quantity: 3ML,   # refills: 3  Last Office Visit : 11/18/24  Future Office visit:  NONE  Routing refill request to provider for review/approval because:  FAILED PROTOCOL :  Medication indicated for associated diagnosis

## 2024-12-17 RX ORDER — SEMAGLUTIDE 2.68 MG/ML
2 INJECTION, SOLUTION SUBCUTANEOUS
Qty: 3 ML | Refills: 3 | Status: SHIPPED | OUTPATIENT
Start: 2024-12-17

## 2025-01-06 ENCOUNTER — OFFICE VISIT (OUTPATIENT)
Dept: OBGYN | Facility: CLINIC | Age: 55
End: 2025-01-06
Attending: INTERNAL MEDICINE
Payer: COMMERCIAL

## 2025-01-06 VITALS
HEIGHT: 64 IN | DIASTOLIC BLOOD PRESSURE: 81 MMHG | HEART RATE: 76 BPM | SYSTOLIC BLOOD PRESSURE: 134 MMHG | BODY MASS INDEX: 33.66 KG/M2

## 2025-01-06 DIAGNOSIS — N93.9 ABNORMAL UTERINE BLEEDING: ICD-10-CM

## 2025-01-06 DIAGNOSIS — R93.89 ENDOMETRIAL THICKENING ON ULTRASOUND: Primary | ICD-10-CM

## 2025-01-06 DIAGNOSIS — D25.0 SUBMUCOUS MYOMA OF UTERUS: ICD-10-CM

## 2025-01-06 PROCEDURE — 99213 OFFICE O/P EST LOW 20 MIN: CPT | Performed by: STUDENT IN AN ORGANIZED HEALTH CARE EDUCATION/TRAINING PROGRAM

## 2025-01-06 RX ORDER — ACETAMINOPHEN 325 MG/1
975 TABLET ORAL ONCE
OUTPATIENT
Start: 2025-01-06 | End: 2025-01-06

## 2025-01-06 NOTE — PROGRESS NOTES
"[facility]    Accompanied by her daughter today who she prefers to help interpret when she does not understand a phrase in English    HPI: Milvia Carrasquillo is a 54 year old  who presents to clinic today to discuss beatrice/postmenopausal bleeding. LMP prior to November was in 2024. Since November has had nearly daily light bleeding. PMH notable for DM2, NAFLD, hypertension and obesity. Denies hormonal medication use. Has used LNG-IUD in the past.     Also is bothered by hot flashes and night sweats.  Using a fan at night.  Interested in medications for management and saw an ad for Veozah.    Is leaving for a trip to [place] for 2 weeks in 3 days    OB History:   , CSx2     Gyn History:   Pap smear history: 23 NILM, HPV neg    Labs:   CMP notable for ALT 64, Cr 0.93  Hgb 14.7  TSH 1.51  FSH 51.9  A1c 6.0%    Imaging:   Pelvic ultrasound 24  Images personally reviewed with the patient.    FINDINGS:  The uterus measures 11.0 x 6.1 x 5.7 cm. There are 2 intramural  hypoechoic fibroids, the larger measuring 2.1 x 1.8 x 1.7 cm.  Additional hypoechoic lesion within the endometrial cavity likely  represents a submucosal fibroid measuring up to 1.0 cm. The  endometrium is enlarged and measures up to approximately 20 mm. There is no free fluid in the pelvis.     Neither ovary is visualized.                                                            IMPRESSION:   1. The endometrium is thickened measuring up to 20 mm. Recommend gynecology consultation for possible endometrial biopsy.  2. Three fibroids demonstrated including a 1.0 cm submucosal fibroid.     PMH, PSH, Family history, Social history, medications and allergies were reviewed and updated in EMR.     No family history of endometrial, breast or ovarian cancer    Objective:   /81   Pulse 76   Ht 1.638 m (5' 4.49\")   BMI 33.66 kg/m    General: Healthy appearing. Alert, oriented. Affect is appropriate. "     Assessment/Plan:   Milvia Carrasquillo is a 54 year old  female with beatrice-/postmenopausal bleeding and a thickened endometrial stripe with submucosal myoma.    -We discussed the need for endometrial sampling to help rule out precancerous or cancerous changes of the endometrium.  Offered in office sampling today versus hysteroscopic sampling in the operating room.  Risks and benefits of both options were discussed.  She desires a hysteroscopic endometrial sampling and myomectomy in the operating room.  Also discussed placement of a Mirena IUD at that time.  She was given information on this and will think about it.  -Orders placed for hysteroscopic myomectomy and endometrial sampling with possible Mirena intrauterine device placement  -Discussed options for treatment of hot flashes.  Lifestyle modifications were discussed.  Medications, specifically hormone therapy and Veozah were discussed.  Offered Mirena IUD insertion at the time of her procedure as progesterone component of hormone therapy.  She would then only need estrogen therapy which could be done using a transdermal patch.  We also reviewed Veozah prescribing instructions and the risk that it can worsen liver dysfunction.  While her current liver enzymes are not 2 times the upper limit of normal, they have been in the past.  Liver disease is a contraindication to Veozah, therefore I discussed that it would likely be safer to start with hormone therapy.  Ultimately we discussed that it is important to diagnose her abnormal uterine bleeding prior to consideration of estrogen.    Mary Vera MD     Cheek-To-Nose Interpolation Flap Text: A decision was made to reconstruct the defect utilizing an interpolation axial flap and a staged reconstruction.  A telfa template was made of the defect.  This telfa template was then used to outline the Cheek-To-Nose Interpolation flap.  The donor area for the pedicle flap was then injected with anesthesia.  The flap was excised through the skin and subcutaneous tissue down to the layer of the underlying musculature.  The interpolation flap was carefully excised within this deep plane to maintain its blood supply.  The edges of the donor site were undermined.   The donor site was closed in a primary fashion.  The pedicle was then rotated into position and sutured.  Once the tube was sutured into place, adequate blood supply was confirmed with blanching and refill.  The pedicle was then wrapped with xeroform gauze and dressed appropriately with a telfa and gauze bandage to ensure continued blood supply and protect the attached pedicle.

## 2025-01-06 NOTE — LETTER
2025       RE: Milvia Carrasquillo  [address] [facility] [address]     Dear Colleague,    Thank you for referring your patient, Milvia Carrasquillo, to the [facility]. Please see a copy of my visit note below.    [facility]    Accompanied by her daughter today who she prefers to help interpret when she does not understand a phrase in English    HPI: Milvia Carrasquillo is a 54 year old  who presents to clinic today to discuss beatrice/postmenopausal bleeding. LMP prior to November was in 2024. Since November has had nearly daily light bleeding. PMH notable for DM2, NAFLD, hypertension and obesity. Denies hormonal medication use. Has used LNG-IUD in the past.     Also is bothered by hot flashes and night sweats.  Using a fan at night.  Interested in medications for management and saw an ad for Veozah.    Is leaving for a trip to [place] for 2 weeks in 3 days    OB History:   , CSx2     Gyn History:   Pap smear history: 23 NILM, HPV neg    Labs:   CMP notable for ALT 64, Cr 0.93  Hgb 14.7  TSH 1.51  FSH 51.9  A1c 6.0%    Imaging:   Pelvic ultrasound 24  Images personally reviewed with the patient.    FINDINGS:  The uterus measures 11.0 x 6.1 x 5.7 cm. There are 2 intramural  hypoechoic fibroids, the larger measuring 2.1 x 1.8 x 1.7 cm.  Additional hypoechoic lesion within the endometrial cavity likely  represents a submucosal fibroid measuring up to 1.0 cm. The  endometrium is enlarged and measures up to approximately 20 mm. There is no free fluid in the pelvis.     Neither ovary is visualized.                                                            IMPRESSION:   1. The endometrium is thickened measuring up to 20 mm. Recommend gynecology consultation for possible endometrial biopsy.  2. Three fibroids demonstrated including a 1.0 cm submucosal fibroid.  "    PMH, PSH, Family history, Social history, medications and allergies were reviewed and updated in EMR.     No family history of endometrial, breast or ovarian cancer    Objective:   /81   Pulse 76   Ht 1.638 m (5' 4.49\")   BMI 33.66 kg/m    General: Healthy appearing. Alert, oriented. Affect is appropriate.     Assessment/Plan:   Milvia Carrasquillo is a 54 year old  female with beatrice-/postmenopausal bleeding and a thickened endometrial stripe with submucosal myoma.    -We discussed the need for endometrial sampling to help rule out precancerous or cancerous changes of the endometrium.  Offered in office sampling today versus hysteroscopic sampling in the operating room.  Risks and benefits of both options were discussed.  She desires a hysteroscopic endometrial sampling and myomectomy in the operating room.  Also discussed placement of a Mirena IUD at that time.  She was given information on this and will think about it.  -Orders placed for hysteroscopic myomectomy and endometrial sampling with possible Mirena intrauterine device placement  -Discussed options for treatment of hot flashes.  Lifestyle modifications were discussed.  Medications, specifically hormone therapy and Veozah were discussed.  Offered Mirena IUD insertion at the time of her procedure as progesterone component of hormone therapy.  She would then only need estrogen therapy which could be done using a transdermal patch.  We also reviewed Veozah prescribing instructions and the risk that it can worsen liver dysfunction.  While her current liver enzymes are not 2 times the upper limit of normal, they have been in the past.  Liver disease is a contraindication to Veozah, therefore I discussed that it would likely be safer to start with hormone therapy.  Ultimately we discussed that it is important to diagnose her abnormal uterine bleeding prior to consideration of estrogen.    Mary Vera MD      Again, thank you for allowing me " to participate in the care of your patient.      Sincerely,    Mary Vera MD

## 2025-01-06 NOTE — PATIENT INSTRUCTIONS
Thank you for trusting us with your care!   Please be aware, if you are on Mychart, you may see your results prior to your providers review. If labs are abnormal, we will call or message you on [employer] with a follow up plan.    If you need to contact us for questions about:  Symptoms, Scheduling & Medical Questions; Non-urgent (2-3 day response) kooaba message, Urgent (needing response today) [phone] (if after 3:30pm next day response)   Prescriptions: Please call your Pharmacy   Billing: Bill [phone] or [facility]:[phone]

## 2025-01-07 ENCOUNTER — TELEPHONE (OUTPATIENT)
Dept: OBGYN | Facility: CLINIC | Age: 55
End: 2025-01-07
Payer: COMMERCIAL

## 2025-01-07 NOTE — TELEPHONE ENCOUNTER
PITO with the assistance of  for patient to call back to schedule procedure with Dr. Chuy ARIAS   Surgery Scheduler

## 2025-01-08 ENCOUNTER — TELEPHONE (OUTPATIENT)
Dept: OBGYN | Facility: CLINIC | Age: 55
End: 2025-01-08
Payer: COMMERCIAL

## 2025-01-08 NOTE — TELEPHONE ENCOUNTER
2nd attempt: LVM with assistance from  for patient to call back to schedule procedure with Dr. Chuy ARIAS  Surgery Scheduler

## 2025-01-29 ENCOUNTER — TELEPHONE (OUTPATIENT)
Dept: INTERNAL MEDICINE | Facility: CLINIC | Age: 55
End: 2025-01-29
Payer: COMMERCIAL

## 2025-01-29 DIAGNOSIS — E11.9 TYPE 2 DIABETES MELLITUS WITHOUT COMPLICATION, WITHOUT LONG-TERM CURRENT USE OF INSULIN (H): ICD-10-CM

## 2025-01-30 RX ORDER — SEMAGLUTIDE 2.68 MG/ML
2 INJECTION, SOLUTION SUBCUTANEOUS
Qty: 3 ML | Refills: 2 | Status: SHIPPED | OUTPATIENT
Start: 2025-01-30

## 2025-01-30 NOTE — TELEPHONE ENCOUNTER
Ozempic 2 mg refill request. It was already sent 12/17/24 with 3 additional refills. I will resend it.

## 2025-02-03 ENCOUNTER — TELEPHONE (OUTPATIENT)
Dept: OBGYN | Facility: CLINIC | Age: 55
End: 2025-02-03

## 2025-02-03 ENCOUNTER — APPOINTMENT (OUTPATIENT)
Dept: INTERPRETER SERVICES | Facility: CLINIC | Age: 55
End: 2025-02-03
Payer: COMMERCIAL

## 2025-02-03 NOTE — TELEPHONE ENCOUNTER
Spoke with patient, scheduled surgery. Patient is aware of date, time, location, prep instructions, need for H&P within 30 days.    Type of surgery: HYSTEROSCOPY, MYOMECTOMY, WITH DILATION AND CURETTAGE OF UTERUS USING MORCELLATOR, POSSIBLE INTRAUTERINE DEVICE PLACEMENT   Location of surgery: [facility] [place] [facility]  Date and time of surgery: 2/21/25 @ 10:15am  Surgeon: Dr. Estefania Masterson MD  Pre-Op H&P Date: 10-30 days with PCP  Post-Op Appt Date: 2-4 weeks after procedure   Packet sent out: Yes  Pre-cert/Authorization completed:  No  Date: 2/3/25

## 2025-02-03 NOTE — TELEPHONE ENCOUNTER
----- Message from Lazara ARIAS sent at 2/3/2025 10:10 AM CST -----  Regarding: Post op with Zelalem Moy can someone call this patient and help her schedule a 2-4 week post op appointment with Dr. Masterson    DOS: 2/21/25    Carol Lubin

## 2025-02-03 NOTE — TELEPHONE ENCOUNTER
This RN called and spoke to Milvia, she is wondering about scheduling surgery as well as physical needed prior to surgery, This RN transferred her to the surgery scheduler.

## 2025-02-06 ENCOUNTER — LAB (OUTPATIENT)
Dept: LAB | Facility: CLINIC | Age: 55
End: 2025-02-06
Payer: COMMERCIAL

## 2025-02-06 ENCOUNTER — OFFICE VISIT (OUTPATIENT)
Dept: INTERNAL MEDICINE | Facility: CLINIC | Age: 55
End: 2025-02-06
Payer: COMMERCIAL

## 2025-02-06 VITALS
TEMPERATURE: 98.1 F | RESPIRATION RATE: 16 BRPM | HEART RATE: 73 BPM | OXYGEN SATURATION: 96 % | DIASTOLIC BLOOD PRESSURE: 74 MMHG | SYSTOLIC BLOOD PRESSURE: 121 MMHG | BODY MASS INDEX: 33.84 KG/M2 | HEIGHT: 64 IN | WEIGHT: 198.2 LBS

## 2025-02-06 DIAGNOSIS — Z01.818 PRE-OP EXAM: ICD-10-CM

## 2025-02-06 DIAGNOSIS — Z01.818 PRE-OP EXAM: Primary | ICD-10-CM

## 2025-02-06 DIAGNOSIS — E11.9 TYPE 2 DIABETES MELLITUS WITHOUT COMPLICATION, WITHOUT LONG-TERM CURRENT USE OF INSULIN (H): ICD-10-CM

## 2025-02-06 DIAGNOSIS — E11.9 DIABETES MELLITUS, TYPE 2 (H): ICD-10-CM

## 2025-02-06 LAB
ALBUMIN SERPL BCG-MCNC: 4.5 G/DL (ref 3.5–5.2)
ALP SERPL-CCNC: 177 U/L (ref 40–150)
ALT SERPL W P-5'-P-CCNC: 23 U/L (ref 0–50)
ANION GAP SERPL CALCULATED.3IONS-SCNC: 11 MMOL/L (ref 7–15)
AST SERPL W P-5'-P-CCNC: 19 U/L (ref 0–45)
BASOPHILS # BLD AUTO: 0.1 10E3/UL (ref 0–0.2)
BASOPHILS NFR BLD AUTO: 1 %
BILIRUB SERPL-MCNC: 0.4 MG/DL
BUN SERPL-MCNC: 18 MG/DL (ref 6–20)
CALCIUM SERPL-MCNC: 9.6 MG/DL (ref 8.8–10.4)
CHLORIDE SERPL-SCNC: 103 MMOL/L (ref 98–107)
CREAT SERPL-MCNC: 0.91 MG/DL (ref 0.51–0.95)
EGFRCR SERPLBLD CKD-EPI 2021: 74 ML/MIN/1.73M2
EOSINOPHIL # BLD AUTO: 0.2 10E3/UL (ref 0–0.7)
EOSINOPHIL NFR BLD AUTO: 2 %
ERYTHROCYTE [DISTWIDTH] IN BLOOD BY AUTOMATED COUNT: 14.4 % (ref 10–15)
EST. AVERAGE GLUCOSE BLD GHB EST-MCNC: 123 MG/DL
GLUCOSE SERPL-MCNC: 93 MG/DL (ref 70–99)
HBA1C MFR BLD: 5.9 %
HCO3 SERPL-SCNC: 25 MMOL/L (ref 22–29)
HCT VFR BLD AUTO: 45.2 % (ref 35–47)
HGB BLD-MCNC: 14.7 G/DL (ref 11.7–15.7)
IMM GRANULOCYTES # BLD: 0.1 10E3/UL
IMM GRANULOCYTES NFR BLD: 1 %
LYMPHOCYTES # BLD AUTO: 2.5 10E3/UL (ref 0.8–5.3)
LYMPHOCYTES NFR BLD AUTO: 23 %
MCH RBC QN AUTO: 27.7 PG (ref 26.5–33)
MCHC RBC AUTO-ENTMCNC: 32.5 G/DL (ref 31.5–36.5)
MCV RBC AUTO: 85 FL (ref 78–100)
MONOCYTES # BLD AUTO: 0.7 10E3/UL (ref 0–1.3)
MONOCYTES NFR BLD AUTO: 6 %
NEUTROPHILS # BLD AUTO: 7.2 10E3/UL (ref 1.6–8.3)
NEUTROPHILS NFR BLD AUTO: 68 %
NRBC # BLD AUTO: 0 10E3/UL
NRBC BLD AUTO-RTO: 0 /100
PLATELET # BLD AUTO: 284 10E3/UL (ref 150–450)
POTASSIUM SERPL-SCNC: 4.6 MMOL/L (ref 3.4–5.3)
PROT SERPL-MCNC: 8.4 G/DL (ref 6.4–8.3)
RBC # BLD AUTO: 5.3 10E6/UL (ref 3.8–5.2)
SODIUM SERPL-SCNC: 139 MMOL/L (ref 135–145)
WBC # BLD AUTO: 10.7 10E3/UL (ref 4–11)

## 2025-02-06 PROCEDURE — 80053 COMPREHEN METABOLIC PANEL: CPT | Performed by: PATHOLOGY

## 2025-02-06 PROCEDURE — 99000 SPECIMEN HANDLING OFFICE-LAB: CPT | Performed by: PATHOLOGY

## 2025-02-06 PROCEDURE — 85025 COMPLETE CBC W/AUTO DIFF WBC: CPT | Performed by: PATHOLOGY

## 2025-02-06 PROCEDURE — 83036 HEMOGLOBIN GLYCOSYLATED A1C: CPT | Performed by: INTERNAL MEDICINE

## 2025-02-06 PROCEDURE — 36415 COLL VENOUS BLD VENIPUNCTURE: CPT | Performed by: PATHOLOGY

## 2025-02-06 NOTE — PROGRESS NOTES
Preoperative Evaluation  [facility] INTERNAL MEDICINE [address]  4TH [facility] [address]  Phone: [phone]  Fax: [phone]  Primary Provider: Ehsan Koehler MD  Pre-op Performing Provider: J Carlos Guillen MD  Feb 6, 2025   {Provider  Link to PREOP SmartSet  REQUIRED to apply standard patient instructions and medication directions to the AVS :286595}  {ROOMER review and update patient entered surgical information if needed :823672}        2/6/2025   Surgical Information   What procedure is being done? HYSTEROSCOPY, MYOMECTOMY, WITH DILATION AND CURETTAGE OF UTERUS USING MORCELLATOR, POSSIBLE INTRAUTERINE DEVICE PLACEMENT   Facility or Hospital where procedure/surgery will be performed: Hospital   Who is doing the procedure / surgery? Dr Estefania Masterson   Date of surgery / procedure: 2/21/2025   Time of surgery / procedure: 1 hour   Where do you plan to recover after surgery? at home with family     Fax number for surgical facility: Note does not need to be faxed, will be available electronically in Epic.    Assessment & Plan     54 year old female presents with abnormal uterine bleeding, will have hysteroscopy +/- intervention per OB-GYN. The patient understands the procedure and the risks thereof.     The proposed surgical procedure is considered LOW risk.    (Z01.818) Pre-op exam  (primary encounter diagnosis)  Comment:   Plan: CBC with platelets and differential, Comprehensive         metabolic panel (BMP + Alb, Alk Phos, ALT, AST,        Total. Bili, TP)            (E11.9) Diabetes mellitus, type 2 (H)  Comment:   Plan: HEMOGLOBIN A1C                 - No identified additional risk factors other than previously addressed     Please hold Ozempic at least 2 weeks prior to the procedure    Recommendation  Approval given to proceed with proposed procedure, without further diagnostic evaluation.    Sebastien Steel is a 54 year old, presenting for the  following:  Pre-Op Exam          2/6/2025     1:42 PM   Additional Questions   Roomed by MR MALHOTRA related to upcoming procedure: Abnormal uterine bleeding, patient with enlarged uterine stripe on imaging.         2/6/2025   Pre-Op Questionnaire   Have you ever had a heart attack or stroke? No   Have you ever had surgery on your heart or blood vessels, such as a stent placement, a coronary artery bypass, or surgery on an artery in your head, neck, heart, or legs? No   Do you have chest pain with activity? No   Do you have a history of heart failure? No   Do you currently have a cold, bronchitis or symptoms of other infection? No   Do you have a cough, shortness of breath, or wheezing? No   Do you or anyone in your family have previous history of blood clots? No   Do you or does anyone in your family have a serious bleeding problem such as prolonged bleeding following surgeries or cuts? No   Have you ever had problems with anemia or been told to take iron pills? No   Have you had any abnormal blood loss such as black, tarry or bloody stools, or abnormal vaginal bleeding? No   Have you ever had a blood transfusion? No   Are you willing to have a blood transfusion if it is medically needed before, during, or after your surgery? (!) YES   Have you or any of your relatives ever had problems with anesthesia? No   Do you have sleep apnea, excessive snoring or daytime drowsiness? No   Do you have any artifical heart valves or other implanted medical devices like a pacemaker, defibrillator, or continuous glucose monitor? No   Do you have artificial joints? No   Are you allergic to latex? No     Health Care Directive  Patient does not have a Health Care Directive: Discussed advance care planning with patient; however, patient declined at this time.    Preoperative Review of    reviewed - no record of controlled substances prescribed.  {Review [place] for all patients per [facility] MNPMP Profile:724265}    Status of Chronic  Conditions:  DIABETES - Patient has a longstanding history of DiabetesType Type II . Patient is being treated with diet and oral agents and denies significant side effects. Control has been good. Complicating factors include but are not limited to: none.     HYPERLIPIDEMIA - Patient has a long history of significant Hyperlipidemia requiring medication for treatment with recent fair control. Patient reports no problems or side effects with the medication.     HYPERTENSION - Patient has longstanding history of HTN , currently denies any symptoms referable to elevated blood pressure. Specifically denies chest pain, palpitations, dyspnea, orthopnea, PND or peripheral edema. Blood pressure readings have been in normal range. Current medication regimen is as listed below. Patient denies any side effects of medication.     SLEEP PROBLEM - Patient has a longstanding history of snoring.. Patient has tried OTC medications with limited success.     Patient Active Problem List    Diagnosis Date Noted    Hyperlipidemia LDL goal <70 05/25/2023     Priority: Medium    Steatohepatitis, non-alcoholic 05/25/2023     Priority: Medium    Diabetes mellitus, type 2 (H) 06/16/2022     Priority: Medium    Class 1 obesity due to excess calories with serious comorbidity and body mass index (BMI) of 34.0 to 34.9 in adult 05/27/2022     Priority: Medium    Snoring 05/27/2022     Priority: Medium    HTN (hypertension) 07/29/2014     Priority: Medium      Past Medical History:   Diagnosis Date    HTN (hypertension)     Osteoarthritis of left hip      Past Surgical History:   Procedure Laterality Date    ARTHROPLASTY HIP Left 5/4/2022    Procedure: ARTHROPLASTY, LEFT HIP, TOTAL;  Surgeon: Joe Genao MD;  Location: [facility] OR     Current Outpatient Medications   Medication Sig Dispense Refill    losartan (COZAAR) 100 MG tablet Take 1 tablet (100 mg) by mouth daily 90 tablet 3    metFORMIN (GLUCOPHAGE-XR) 750 MG 24 hr tablet Take 1 tablet (750  "mg) by mouth daily (with dinner) 90 tablet 3    rosuvastatin (CRESTOR) 10 MG tablet Take 1 tablet (10 mg) by mouth daily 90 tablet 3    Semaglutide, 2 MG/DOSE, (OZEMPIC, 2 MG/DOSE,) 8 MG/3ML pen Inject 2 mg subcutaneously every 7 days. 3 mL 2    amLODIPine (NORVASC) 10 MG tablet Take 1 tablet (10 mg) by mouth daily (Patient not taking: Reported on 2/6/2025) 90 tablet 3    azelastine (OPTIVAR) 0.05 % ophthalmic solution Place 1 drop into both eyes 2 times daily (Patient not taking: Reported on 2/6/2025) 6 mL 0       Allergies   Allergen Reactions    Seasonal Allergies Other (See Comments), Itching and Fatigue     Eyes itchiness, runny nose, fatigue.        Social History     Tobacco Use    Smoking status: Never     Passive exposure: Never    Smokeless tobacco: Never   Substance Use Topics    Alcohol use: Yes     Comment: rare     {FAMILY HISTORY (Optional):163761691}  History   Drug Use Unknown             Review of Systems  Constitutional, HEENT, cardiovascular, pulmonary, GI, , musculoskeletal, neuro, skin, endocrine and psych systems are negative, except as otherwise noted.    Objective    /74 (BP Location: Right arm, Patient Position: Sitting, Cuff Size: Adult Regular)   Pulse 73   Temp 98.1  F (36.7  C) (Oral)   Resp 16   Ht 1.638 m (5' 4.49\")   Wt 89.9 kg (198 lb 3.2 oz)   SpO2 96%   BMI 33.51 kg/m     Estimated body mass index is 33.51 kg/m  as calculated from the following:    Height as of this encounter: 1.638 m (5' 4.49\").    Weight as of this encounter: 89.9 kg (198 lb 3.2 oz).  Physical Exam  GENERAL: alert and no distress  NECK: no adenopathy, no asymmetry, masses, or scars  RESP: lungs clear to auscultation - no rales, rhonchi or wheezes  CV: regular rate and rhythm, normal S1 S2, no S3 or S4, no murmur, click or rub, no peripheral edema  ABDOMEN: soft, nontender, no hepatosplenomegaly, no masses and bowel sounds normal  MS: no gross musculoskeletal defects noted, no edema    Recent Labs "   Lab Test 11/18/24  1701 06/17/24  1750   HGB 14.7  --      --    INR 0.95  --     137   POTASSIUM 4.0 4.1   CR 0.93 0.85   A1C 6.0* 6.4*        Diagnostics  Labs pending at this time.  Results will be reviewed when available.   No EKG required, no history of coronary heart disease, significant arrhythmia, peripheral arterial disease or other structural heart disease.    Revised Cardiac Risk Index (RCRI)  The patient has the following serious cardiovascular risks for perioperative complications:   - No serious cardiac risks = 0 points     RCRI Interpretation: 0 points: Class I (very low risk - 0.4% complication rate)         Signed Electronically by: J Carlos Guillen MD  A copy of this evaluation report is provided to the requesting physician.    {Provider Resources  Preop [facility] Guidelines  Revised Cardiac Risk Index :812951}   {Email feedback regarding this note to [email or web address]   :[id number]}

## 2025-02-18 ENCOUNTER — APPOINTMENT (OUTPATIENT)
Dept: INTERPRETER SERVICES | Facility: CLINIC | Age: 55
End: 2025-02-18
Payer: COMMERCIAL

## 2025-02-19 ENCOUNTER — APPOINTMENT (OUTPATIENT)
Dept: INTERPRETER SERVICES | Facility: CLINIC | Age: 55
End: 2025-02-19
Payer: COMMERCIAL

## 2025-02-21 ENCOUNTER — HOSPITAL ENCOUNTER (OUTPATIENT)
Facility: CLINIC | Age: 55
Discharge: HOME | End: 2025-02-21
Attending: STUDENT IN AN ORGANIZED HEALTH CARE EDUCATION/TRAINING PROGRAM | Admitting: STUDENT IN AN ORGANIZED HEALTH CARE EDUCATION/TRAINING PROGRAM
Payer: COMMERCIAL

## 2025-02-21 VITALS
WEIGHT: 202.82 LBS | TEMPERATURE: 97.5 F | OXYGEN SATURATION: 95 % | BODY MASS INDEX: 33.79 KG/M2 | HEART RATE: 86 BPM | DIASTOLIC BLOOD PRESSURE: 74 MMHG | RESPIRATION RATE: 18 BRPM | HEIGHT: 65 IN | SYSTOLIC BLOOD PRESSURE: 133 MMHG

## 2025-02-21 DIAGNOSIS — D25.0 SUBMUCOUS MYOMA OF UTERUS: ICD-10-CM

## 2025-02-21 DIAGNOSIS — R93.89 ENDOMETRIAL THICKENING ON ULTRASOUND: ICD-10-CM

## 2025-02-21 DIAGNOSIS — N93.9 ABNORMAL UTERINE BLEEDING: ICD-10-CM

## 2025-02-21 DIAGNOSIS — Z98.890 STATUS POST HYSTEROSCOPY: Primary | ICD-10-CM

## 2025-02-21 LAB
GLUCOSE BLDC GLUCOMTR-MCNC: 109 MG/DL (ref 70–99)
HCG UR QL: NEGATIVE

## 2025-02-21 PROCEDURE — 999N000141 HC STATISTIC PRE-PROCEDURE NURSING ASSESSMENT: Performed by: STUDENT IN AN ORGANIZED HEALTH CARE EDUCATION/TRAINING PROGRAM

## 2025-02-21 PROCEDURE — 272N000001 HC OR GENERAL SUPPLY STERILE: Performed by: STUDENT IN AN ORGANIZED HEALTH CARE EDUCATION/TRAINING PROGRAM

## 2025-02-21 PROCEDURE — 370N000017 HC ANESTHESIA TECHNICAL FEE, PER MIN: Performed by: STUDENT IN AN ORGANIZED HEALTH CARE EDUCATION/TRAINING PROGRAM

## 2025-02-21 PROCEDURE — 250N000013 HC RX MED GY IP 250 OP 250 PS 637: Performed by: STUDENT IN AN ORGANIZED HEALTH CARE EDUCATION/TRAINING PROGRAM

## 2025-02-21 PROCEDURE — 250N000013 HC RX MED GY IP 250 OP 250 PS 637: Performed by: ANESTHESIOLOGY

## 2025-02-21 PROCEDURE — 710N000012 HC RECOVERY PHASE 2, PER MINUTE: Performed by: STUDENT IN AN ORGANIZED HEALTH CARE EDUCATION/TRAINING PROGRAM

## 2025-02-21 PROCEDURE — 58561 HYSTEROSCOPY REMOVE MYOMA: CPT | Mod: GC | Performed by: STUDENT IN AN ORGANIZED HEALTH CARE EDUCATION/TRAINING PROGRAM

## 2025-02-21 PROCEDURE — 82962 GLUCOSE BLOOD TEST: CPT

## 2025-02-21 PROCEDURE — 88305 TISSUE EXAM BY PATHOLOGIST: CPT | Mod: 26 | Performed by: PATHOLOGY

## 2025-02-21 PROCEDURE — 58300 INSERT INTRAUTERINE DEVICE: CPT | Mod: GC | Performed by: STUDENT IN AN ORGANIZED HEALTH CARE EDUCATION/TRAINING PROGRAM

## 2025-02-21 PROCEDURE — 250N000011 HC RX IP 250 OP 636

## 2025-02-21 PROCEDURE — 88305 TISSUE EXAM BY PATHOLOGIST: CPT | Mod: TC | Performed by: STUDENT IN AN ORGANIZED HEALTH CARE EDUCATION/TRAINING PROGRAM

## 2025-02-21 PROCEDURE — 250N000011 HC RX IP 250 OP 636: Performed by: STUDENT IN AN ORGANIZED HEALTH CARE EDUCATION/TRAINING PROGRAM

## 2025-02-21 PROCEDURE — 360N000076 HC SURGERY LEVEL 3, PER MIN: Performed by: STUDENT IN AN ORGANIZED HEALTH CARE EDUCATION/TRAINING PROGRAM

## 2025-02-21 PROCEDURE — 81025 URINE PREGNANCY TEST: CPT | Performed by: STUDENT IN AN ORGANIZED HEALTH CARE EDUCATION/TRAINING PROGRAM

## 2025-02-21 DEVICE — IMPLANTABLE DEVICE: Type: IMPLANTABLE DEVICE | Site: UTERUS | Status: FUNCTIONAL

## 2025-02-21 RX ORDER — ACETAMINOPHEN 325 MG/1
975 TABLET ORAL ONCE
Status: COMPLETED | OUTPATIENT
Start: 2025-02-21 | End: 2025-02-21

## 2025-02-21 RX ORDER — IBUPROFEN 800 MG/1
800 TABLET, FILM COATED ORAL EVERY 6 HOURS PRN
Qty: 30 TABLET | Refills: 0 | Status: SHIPPED | OUTPATIENT
Start: 2025-02-21

## 2025-02-21 RX ORDER — ONDANSETRON 4 MG/1
4 TABLET, ORALLY DISINTEGRATING ORAL EVERY 30 MIN PRN
Status: DISCONTINUED | OUTPATIENT
Start: 2025-02-21 | End: 2025-02-21 | Stop reason: HOSPADM

## 2025-02-21 RX ORDER — ACETAMINOPHEN 325 MG/1
975 TABLET ORAL EVERY 6 HOURS PRN
Qty: 50 TABLET | Refills: 0 | Status: SHIPPED | OUTPATIENT
Start: 2025-02-21

## 2025-02-21 RX ORDER — NALOXONE HYDROCHLORIDE 0.4 MG/ML
0.1 INJECTION, SOLUTION INTRAMUSCULAR; INTRAVENOUS; SUBCUTANEOUS
Status: DISCONTINUED | OUTPATIENT
Start: 2025-02-21 | End: 2025-02-21 | Stop reason: HOSPADM

## 2025-02-21 RX ORDER — HYDROMORPHONE HYDROCHLORIDE 1 MG/ML
0.4 INJECTION, SOLUTION INTRAMUSCULAR; INTRAVENOUS; SUBCUTANEOUS EVERY 5 MIN PRN
Status: DISCONTINUED | OUTPATIENT
Start: 2025-02-21 | End: 2025-02-21 | Stop reason: HOSPADM

## 2025-02-21 RX ORDER — LIDOCAINE HYDROCHLORIDE AND EPINEPHRINE 10; 10 MG/ML; UG/ML
INJECTION, SOLUTION INFILTRATION; PERINEURAL PRN
Status: DISCONTINUED | OUTPATIENT
Start: 2025-02-21 | End: 2025-02-21 | Stop reason: HOSPADM

## 2025-02-21 RX ORDER — OXYCODONE HYDROCHLORIDE 5 MG/1
5 TABLET ORAL
Status: DISCONTINUED | OUTPATIENT
Start: 2025-02-21 | End: 2025-02-21 | Stop reason: HOSPADM

## 2025-02-21 RX ORDER — HYDROMORPHONE HYDROCHLORIDE 1 MG/ML
0.2 INJECTION, SOLUTION INTRAMUSCULAR; INTRAVENOUS; SUBCUTANEOUS EVERY 5 MIN PRN
Status: DISCONTINUED | OUTPATIENT
Start: 2025-02-21 | End: 2025-02-21 | Stop reason: HOSPADM

## 2025-02-21 RX ORDER — SODIUM CHLORIDE, SODIUM LACTATE, POTASSIUM CHLORIDE, CALCIUM CHLORIDE 600; 310; 30; 20 MG/100ML; MG/100ML; MG/100ML; MG/100ML
INJECTION, SOLUTION INTRAVENOUS CONTINUOUS
Status: DISCONTINUED | OUTPATIENT
Start: 2025-02-21 | End: 2025-02-21 | Stop reason: HOSPADM

## 2025-02-21 RX ORDER — ONDANSETRON 2 MG/ML
4 INJECTION INTRAMUSCULAR; INTRAVENOUS EVERY 30 MIN PRN
Status: DISCONTINUED | OUTPATIENT
Start: 2025-02-21 | End: 2025-02-21 | Stop reason: HOSPADM

## 2025-02-21 RX ORDER — IBUPROFEN 200 MG
800 TABLET ORAL ONCE
Status: DISCONTINUED | OUTPATIENT
Start: 2025-02-21 | End: 2025-02-21 | Stop reason: HOSPADM

## 2025-02-21 RX ORDER — OXYCODONE HYDROCHLORIDE 5 MG/1
10 TABLET ORAL
Status: DISCONTINUED | OUTPATIENT
Start: 2025-02-21 | End: 2025-02-21 | Stop reason: HOSPADM

## 2025-02-21 RX ORDER — FENTANYL CITRATE 50 UG/ML
25 INJECTION, SOLUTION INTRAMUSCULAR; INTRAVENOUS EVERY 5 MIN PRN
Status: DISCONTINUED | OUTPATIENT
Start: 2025-02-21 | End: 2025-02-21 | Stop reason: HOSPADM

## 2025-02-21 RX ORDER — OXYCODONE HYDROCHLORIDE 5 MG/1
5 TABLET ORAL
Status: COMPLETED | OUTPATIENT
Start: 2025-02-21 | End: 2025-02-21

## 2025-02-21 RX ORDER — DEXAMETHASONE SODIUM PHOSPHATE 4 MG/ML
4 INJECTION, SOLUTION INTRA-ARTICULAR; INTRALESIONAL; INTRAMUSCULAR; INTRAVENOUS; SOFT TISSUE
Status: DISCONTINUED | OUTPATIENT
Start: 2025-02-21 | End: 2025-02-21 | Stop reason: HOSPADM

## 2025-02-21 RX ORDER — LIDOCAINE 40 MG/G
CREAM TOPICAL
Status: DISCONTINUED | OUTPATIENT
Start: 2025-02-21 | End: 2025-02-21 | Stop reason: HOSPADM

## 2025-02-21 RX ORDER — ACETAMINOPHEN 325 MG/1
975 TABLET ORAL ONCE
Status: DISCONTINUED | OUTPATIENT
Start: 2025-02-21 | End: 2025-02-21 | Stop reason: HOSPADM

## 2025-02-21 RX ORDER — FENTANYL CITRATE 50 UG/ML
50 INJECTION, SOLUTION INTRAMUSCULAR; INTRAVENOUS EVERY 5 MIN PRN
Status: DISCONTINUED | OUTPATIENT
Start: 2025-02-21 | End: 2025-02-21 | Stop reason: HOSPADM

## 2025-02-21 RX ADMIN — OXYCODONE HYDROCHLORIDE 5 MG: 5 TABLET ORAL at 11:27

## 2025-02-21 RX ADMIN — ACETAMINOPHEN 975 MG: 325 TABLET, FILM COATED ORAL at 08:23

## 2025-02-21 ASSESSMENT — ACTIVITIES OF DAILY LIVING (ADL)
ADLS_ACUITY_SCORE: 45
ADLS_ACUITY_SCORE: 46
ADLS_ACUITY_SCORE: 46
ADLS_ACUITY_SCORE: 45
ADLS_ACUITY_SCORE: 46

## 2025-02-21 NOTE — DISCHARGE INSTRUCTIONS
To contact a doctor, call Dr. Masterson's Office or:     [phone] and ask for the Resident On Call for:          Gynecology (answered 24 hours a day)   Emergency Departments:  [facility] Emergency Department: [phone]     [facility]'s Emergency Department: [phone]     Acetaminophen (Tylenol) was given at 8:23am. Next dose at 2:23pm if needed for pain.     Ibuprofen (Motrin) was given at 10:11am. Next dose at 5:11pm if needed for pain.     Repair Type: Intermediate

## 2025-02-21 NOTE — OR NURSING
10:25 patient arrived to PACU with  present for bedside handoff.  present and utilized for remainder of recovery.     PACU RN inquired about Mirena IUD info card for patient's personal records. OR RN unable to locate card prior to discharge.

## 2025-02-21 NOTE — OP NOTE
[facility]  Operative Note    Name: Milvia Nieto  MRN: [id number]  : 1970  Date of surgery: 2025    Preoperative diagnosis:  - Perimenopausal bleeding  - Thickened endometrial stripe   - Vasomotor symptoms of menopause    Postoperative diagnosis:  - Same as above  - Submucosal fibroid  - Endometrial polyps    Procedure(s):  HYSTEROSCOPY, MYOMECTOMY, WITH DILATION AND CURETTAGE OF UTERUS USING MORCELLATOR,  INTRAUTERINE DEVICE PLACEMENT    Surgeon: Estefania Masterson MD  Resident: Chrisitna Palomares MD, PGY-1    Anesthesia: Local with MAC    EBL: 10cc  Urine output: voided prior to OR  IV fluids: 700cc crystalloid    Fluid deficit: 155mL normal saline    Specimens:   ID Type Source Tests Collected by Time Destination   1 : Endometrium fibroids, polyp, and curettings Polyp Endometrium SURGICAL PATHOLOGY EXAM Estefania Masterson MD 2025  9:44 AM      Implants:   Implant Name Type Inv. Item Serial No.  Lot No. LRB No. Used Action   Mirena IUD Contraceptive Device   [employer] [id number] N/A 1 Implanted   Exp: 2027    Drains: None    Complications: None apparent    Findings: EUA revealed skin tag on right labia majora, otherwise external genitalia without lesions or abnormality. Cervix and vagina without lesion. Uterine cavity with a right inferolateral endometrial polyp, left lateral submucosal fibroid, and left superolateral endometrial polyp. Ridge of tissue noted on the posterior side of the uterine cavity near the fundus. Small calcifications visualized on the anterior side of the uterine cavity. Uterine cavity sounded to 10cm. Mirena strings visualized through cervical os at the end of case.     Indications: Milvia Nieto is a 54 year old female who presented with beatrice-postmenopausal bleeding. She was found to have a 1.0 cm submucosal fibroid and a 20mm endometrial stripe on ultrasound. Hysteroscopic myomectomy and endometrial sampling was recommended, and  patient desired to have Mirena IUD placed at the same time. Risks, benefits, and alternatives to the procedure were discussed. The patient's questions were answered, understanding confirmed, and the patient signed written informed consent.    Technique: The patient was taken to the operating room where she was placed in the dorsal lithotomy position with [place] stirrups. She underwent MAC anesthesia. An exam under anesthesia was performed with findings noted above. The patient was prepped and draped in the usual sterile fashion. A surgical time out was performed. A speculum was placed and the cervix visualized. 2cc of 1% lidocaine with epinephrine was infiltrated into the anterior lip of the cervix and a single-toothed tenaculum was placed. A paracervical block was performed at 4- and 8-o'clock using a total of 20cc 1% lidocaine with epinephrine. The cervix was carefully dilated to 21 Fr with sequential Hunter dilators. The hysteroscope was placed easily through the cervix into the uterine cavity with findings noted above. The speculum was removed. The hysteroscopic morcellator was used to remove the polyps and fibroids. The ridge of tissue across the posterior wall was taken down with the morcellator. A sampling of the anterior wall was also taken. The hysteroscope apparatus was removed and total of 155mL fluid deficit of normal saline noted. The curettings were sent to pathology. The speculum was replaced and the cervix visualized. The uterus was sounded to 10cm. The Mirena was placed with the standard procedure. The strings were trimmed to 3cm from the external os. The tenaculum was removed from the cervix and good hemostasis was achieved with pressure. All instruments were removed from the vagina at the conclusion of the case.    Instrument counts were correct x2. Dr. Masterson was present and scrubbed for the entire procedure. The patient was awoken in the OR and transferred to the PACU in stable  condition.    Christina Palomares MD, MSc  [facility] OB/GYN, PGY-1  02/21/2025 10:19 AM     I was present and scrubbed for the entire procedure and agree with the above note.    Estefania Masterson MD,  02/21/25 10:33 AM

## 2025-02-24 ENCOUNTER — APPOINTMENT (OUTPATIENT)
Dept: INTERPRETER SERVICES | Facility: CLINIC | Age: 55
End: 2025-02-24
Payer: COMMERCIAL

## 2025-02-26 LAB
PATH REPORT.COMMENTS IMP SPEC: NORMAL
PATH REPORT.COMMENTS IMP SPEC: NORMAL
PATH REPORT.FINAL DX SPEC: NORMAL
PATH REPORT.GROSS SPEC: NORMAL
PATH REPORT.MICROSCOPIC SPEC OTHER STN: NORMAL
PATH REPORT.RELEVANT HX SPEC: NORMAL
PHOTO IMAGE: NORMAL

## 2025-03-03 ENCOUNTER — TELEPHONE (OUTPATIENT)
Dept: OBGYN | Facility: CLINIC | Age: 55
End: 2025-03-03
Payer: COMMERCIAL

## 2025-03-03 ENCOUNTER — MYC REFILL (OUTPATIENT)
Dept: INTERNAL MEDICINE | Facility: CLINIC | Age: 55
End: 2025-03-03
Payer: COMMERCIAL

## 2025-03-03 ENCOUNTER — MYC MEDICAL ADVICE (OUTPATIENT)
Dept: OBGYN | Facility: CLINIC | Age: 55
End: 2025-03-03
Payer: COMMERCIAL

## 2025-03-03 DIAGNOSIS — E11.9 TYPE 2 DIABETES MELLITUS WITHOUT COMPLICATION, WITHOUT LONG-TERM CURRENT USE OF INSULIN (H): ICD-10-CM

## 2025-03-03 RX ORDER — SEMAGLUTIDE 2.68 MG/ML
2 INJECTION, SOLUTION SUBCUTANEOUS
Qty: 3 ML | Refills: 2 | Status: CANCELLED | OUTPATIENT
Start: 2025-03-03

## 2025-03-03 NOTE — TELEPHONE ENCOUNTER
[facility]    Phone Message    May a detailed message be left on voicemail: yes     Reason for Call: Requesting Results     Name/type of test: Surgical Pathology Results  Date of test: 02/21/2025  Was test done at a location other than [facility] (Please fill in the location if not [facility])?: No    Action Taken: Other: OBGYN    Travel Screening: Not Applicable     Date of Service:

## 2025-03-05 NOTE — TELEPHONE ENCOUNTER
Last Written Prescription:  Semaglutide, 2 MG/DOSE, (OZEMPIC, 2 MG/DOSE,) 8 MG/3ML pen3 mL21/30/2025   Sig - Route: Inject 2 mg subcutaneously every 7 days. - Subcutaneous   [id number] - [place] - [address] [facility] [address]     ----------------------  Last Visit Date: 2/6/25   Future Visit Date: None  ----------------------     Refills in place/ sent 1/30/25 and telephone/ verbal reiteration of order left for pharmacy today 3/5/25. If pharmacy needs a prior authorization rather than a refill, the clinic staff can assist with that process.

## 2025-03-06 NOTE — TELEPHONE ENCOUNTER
"Patient inquiring if bleeding will change and if she should start patches.     Patient was seen in clinic 1/6 for beatrice/postmenopausal bleeding with Dr. Vera. Provider noted:   \"Discussed options for treatment of hot flashes.  Lifestyle modifications were discussed.  Medications, specifically hormone therapy and Veozah were discussed.  Offered Mirena IUD insertion at the time of her procedure as progesterone component of hormone therapy.  She would then only need estrogen therapy which could be done using a transdermal patch.  We also reviewed Veozah prescribing instructions and the risk that it can worsen liver dysfunction.  While her current liver enzymes are not 2 times the upper limit of normal, they have been in the past.  Liver disease is a contraindication to Veozah, therefore I discussed that it would likely be safer to start with hormone therapy.  Ultimately we discussed that it is important to diagnose her abnormal uterine bleeding prior to consideration of estrogen.\"    Hysteroscopic myomectomy and endometrial sampling with Mirena intrauterine device placement with Dr. Masterson occurred 2/21.     Informed patient she can expect spotting for up to 6 weeks with IUD. Routed to provider for insight on estrogen patches.    "

## 2025-03-20 DIAGNOSIS — E11.9 TYPE 2 DIABETES MELLITUS WITHOUT COMPLICATION, WITHOUT LONG-TERM CURRENT USE OF INSULIN (H): Primary | ICD-10-CM

## 2025-03-20 DIAGNOSIS — E66.811 CLASS 1 OBESITY WITH SERIOUS COMORBIDITY AND BODY MASS INDEX (BMI) OF 33.0 TO 33.9 IN ADULT, UNSPECIFIED OBESITY TYPE: ICD-10-CM

## 2025-03-20 RX ORDER — DULAGLUTIDE 1.5 MG/.5ML
1.5 INJECTION, SOLUTION SUBCUTANEOUS
Qty: 2 ML | Refills: 0 | Status: SHIPPED | OUTPATIENT
Start: 2025-03-20 | End: 2026-02-19

## 2025-03-23 NOTE — PROGRESS NOTES
"[facility]  Follow-Up Visit    S: Milvia Nieto is a 54 year old  here for follow up after hysteroscopy with D&C, hysteroscopic myomectomy, and intrauterine device placement. She is seen with the assistance of a  via iPad. Milvia presented originally with beatrice/postmenopausal bleeding. On pelvic ultrasound, she was found to have a 1.0 cm submucosal fibroid and a 2 cm endometrial stripe. Hysteroscopic myomectomy with endometrial sampling was recommended; patient desired LNG-IUD placement at the same time. Her procedure on 25 was uncomplicated. Surgical pathology demonstrated fragments of endometrial polyp, fragments of leiomyoma, and was negative for atypia or malignancy.    Since her procedure, Milvia has had ongoing hot flashes. She called into the office and opted to start using estrogen patches in the setting of normal uterine pathology. She has felt much better since starting the patches. Her hot flashes and mood changes have improved significantly. However, she is having continued daily bleeding. Just after the surgery, her bleeding did seem to diminish; it picked up again after starting the patch. She feels like it is again decreasing, although is still present daily. At minimum, she will have to wear a panty liner but does sometimes wear pads. She will change her panty liner about twice daily, but this is not significantly different from what she did prior to the surgery. She is sick of the daily bleeding and wonders what else she could do. She has also noticed some weight gain since surgery and wonders if there is any potential explanation for this.    O:  BP (!) 151/86   Pulse 82   Ht 1.651 m (5' 5\")   Wt 95.1 kg (209 lb 9.6 oz)   BMI 34.88 kg/m    Gen: Well-appearing, NAD  HEENT: Normocephalic, atraumatic  CV:        Well perfused; no LE edema  Pulm:  Normal respiratory effort and rate    A/P:  Ms. Milvia Nieto is a 54 year old  here for follow up after her " hysteroscopic myomectomy with D&C and LNG-IUD placement on 2/21/25 as well as for follow up of perimenopausal symptoms. Overall with improvement in her perimenopausal symptoms. However, with ongoing vaginal bleeding.  - Discussed that it can take time for bleeding pattern to stabilize after intrauterine device placement, and that addition of estrogen patches likely also affected bleeding. Recommended giving more time to assess stabilization of bleeding pattern.  - Patient asked about hormonal tests. Discussed that these would likely be affected by patient's use of patch and IUD and would not necessarily explain her bleeding pattern.  - Discussed alternative options for management of bleeding including alternative medications and surgical management including ablation and hysterectomy. She is not yet ready to discuss surgical management, particularly hysterectomy, and would like to defer discussion at this time.  - Plan to continue with LNG-IUD and estrogen patches at this time. Will plan to follow up with her primary gynecologist regarding bleeding, plans for management.    Staffed with Dr. René Rasheed MD  OB/GYN PGY-3  03/26/2025 4:16 PM    The Patient was seen in [facility] by Dr. Rasheed.   I reviewed the history & exam. Assessment and plan were jointly made.   Graciela Merritt MD, MPH

## 2025-03-26 ENCOUNTER — OFFICE VISIT (OUTPATIENT)
Dept: OBGYN | Facility: CLINIC | Age: 55
End: 2025-03-26
Attending: STUDENT IN AN ORGANIZED HEALTH CARE EDUCATION/TRAINING PROGRAM
Payer: COMMERCIAL

## 2025-03-26 VITALS
SYSTOLIC BLOOD PRESSURE: 151 MMHG | WEIGHT: 209.6 LBS | HEART RATE: 82 BPM | HEIGHT: 65 IN | DIASTOLIC BLOOD PRESSURE: 86 MMHG | BODY MASS INDEX: 34.92 KG/M2

## 2025-03-26 DIAGNOSIS — N93.9 ABNORMAL UTERINE BLEEDING: Primary | ICD-10-CM

## 2025-03-26 DIAGNOSIS — N95.1 VASOMOTOR SYMPTOMS DUE TO MENOPAUSE: ICD-10-CM

## 2025-03-26 PROBLEM — E66.09 NON MORBID OBESITY DUE TO EXCESS CALORIES: Status: ACTIVE | Noted: 2017-08-15

## 2025-03-26 PROCEDURE — T1013 SIGN LANG/ORAL INTERPRETER: HCPCS | Mod: U4

## 2025-03-26 PROCEDURE — 99213 OFFICE O/P EST LOW 20 MIN: CPT

## 2025-03-26 NOTE — LETTER
3/26/2025       RE: Milvia Nieto  [address]  [place] [address]     Dear Colleague,    Thank you for referring your patient, Milvia Nieto, to the [facility] [place] [facility]. Please see a copy of my visit note below.    [facility]  Follow-Up Visit    S: Milvia Nieto is a 54 year old  here for follow up after hysteroscopy with D&C, hysteroscopic myomectomy, and intrauterine device placement. She is seen with the assistance of a  via iPad. Milvia presented originally with beatrice/postmenopausal bleeding. On pelvic ultrasound, she was found to have a 1.0 cm submucosal fibroid and a 2 cm endometrial stripe. Hysteroscopic myomectomy with endometrial sampling was recommended; patient desired LNG-IUD placement at the same time. Her procedure on 25 was uncomplicated. Surgical pathology demonstrated fragments of endometrial polyp, fragments of leiomyoma, and was negative for atypia or malignancy.    Since her procedure, Milvia has had ongoing hot flashes. She called into the office and opted to start using estrogen patches in the setting of normal uterine pathology. She has felt much better since starting the patches. Her hot flashes and mood changes have improved significantly. However, she is having continued daily bleeding. Just after the surgery, her bleeding did seem to diminish; it picked up again after starting the patch. She feels like it is again decreasing, although is still present daily. At minimum, she will have to wear a panty liner but does sometimes wear pads. She will change her panty liner about twice daily, but this is not significantly different from what she did prior to the surgery. She is sick of the daily bleeding and wonders what else she could do. She has also noticed some weight gain since surgery and wonders if there is any potential explanation for this.    O:  BP (!)  "151/86   Pulse 82   Ht 1.651 m (5' 5\")   Wt 95.1 kg (209 lb 9.6 oz)   BMI 34.88 kg/m    Gen: Well-appearing, NAD  HEENT: Normocephalic, atraumatic  CV:        Well perfused; no LE edema  Pulm:  Normal respiratory effort and rate    A/P:  Ms. Milvia Nieto is a 54 year old  here for follow up after her hysteroscopic myomectomy with D&C and LNG-IUD placement on 25 as well as for follow up of perimenopausal symptoms. Overall with improvement in her perimenopausal symptoms. However, with ongoing vaginal bleeding.  - Discussed that it can take time for bleeding pattern to stabilize after intrauterine device placement, and that addition of estrogen patches likely also affected bleeding. Recommended giving more time to assess stabilization of bleeding pattern.  - Patient asked about hormonal tests. Discussed that these would likely be affected by patient's use of patch and IUD and would not necessarily explain her bleeding pattern.  - Discussed alternative options for management of bleeding including alternative medications and surgical management including ablation and hysterectomy. She is not yet ready to discuss surgical management, particularly hysterectomy, and would like to defer discussion at this time.  - Plan to continue with LNG-IUD and estrogen patches at this time. Will plan to follow up with her primary gynecologist regarding bleeding, plans for management.    Staffed with Dr. René Rasheed MD  OB/GYN PGY-3  2025 4:16 PM    The Patient was seen in [facility] by Dr. Rasheed.   I reviewed the history & exam. Assessment and plan were jointly made.   Graciela Merritt MD, MPH      Again, thank you for allowing me to participate in the care of your patient.      Sincerely,    Dar Rasheed MD    "

## 2025-03-26 NOTE — NURSING NOTE
Chief Complaint   Patient presents with    Post-op Visit     DOS: 02/21/2025 HYSTEROSCOPY, MYOMECTOMY, WITH DILATION AND CURETTAGE OF UTERUS USING MORCELLATOR, INTRAUTERINE DEVICE PLACEMENT        used:    ID# [id number]

## 2025-04-14 ENCOUNTER — OFFICE VISIT (OUTPATIENT)
Dept: INTERNAL MEDICINE | Facility: CLINIC | Age: 55
End: 2025-04-14
Payer: COMMERCIAL

## 2025-04-14 VITALS
OXYGEN SATURATION: 97 % | HEART RATE: 82 BPM | HEIGHT: 65 IN | BODY MASS INDEX: 34.74 KG/M2 | TEMPERATURE: 98 F | RESPIRATION RATE: 16 BRPM | DIASTOLIC BLOOD PRESSURE: 84 MMHG | WEIGHT: 208.5 LBS | SYSTOLIC BLOOD PRESSURE: 138 MMHG

## 2025-04-14 DIAGNOSIS — E78.5 HYPERLIPIDEMIA LDL GOAL <100: ICD-10-CM

## 2025-04-14 DIAGNOSIS — N93.9 ABNORMAL UTERINE BLEEDING: ICD-10-CM

## 2025-04-14 DIAGNOSIS — E66.811 CLASS 1 OBESITY WITH SERIOUS COMORBIDITY AND BODY MASS INDEX (BMI) OF 33.0 TO 33.9 IN ADULT, UNSPECIFIED OBESITY TYPE: Primary | ICD-10-CM

## 2025-04-14 DIAGNOSIS — E11.9 TYPE 2 DIABETES MELLITUS WITHOUT COMPLICATION, WITHOUT LONG-TERM CURRENT USE OF INSULIN (H): ICD-10-CM

## 2025-04-14 DIAGNOSIS — I10 HYPERTENSION, UNSPECIFIED TYPE: ICD-10-CM

## 2025-04-14 DIAGNOSIS — K76.0 NAFLD (NONALCOHOLIC FATTY LIVER DISEASE): ICD-10-CM

## 2025-04-14 PROCEDURE — 3075F SYST BP GE 130 - 139MM HG: CPT | Performed by: INTERNAL MEDICINE

## 2025-04-14 PROCEDURE — 3079F DIAST BP 80-89 MM HG: CPT | Performed by: INTERNAL MEDICINE

## 2025-04-14 PROCEDURE — 99214 OFFICE O/P EST MOD 30 MIN: CPT | Performed by: INTERNAL MEDICINE

## 2025-04-14 RX ORDER — DULAGLUTIDE 1.5 MG/.5ML
1.5 INJECTION, SOLUTION SUBCUTANEOUS
Qty: 2 ML | Refills: 0 | Status: SHIPPED | OUTPATIENT
Start: 2025-04-14

## 2025-04-14 NOTE — PROGRESS NOTES
Assessment & Plan     Class 1 obesity with serious comorbidity and body mass index (BMI) of 33.0 to 33.9 in adult, unspecified obesity type  Long discussion on treatment plan. Significant weight gain off of semaglutide (insurance plan stopped covering this). Plan to restart a GLP-1a (dulaglutide) that is covered and engage in exercise/diet changes with support from wt mgmt team.  - dulaglutide (TRULICITY) 1.5 MG/0.5ML pen; Inject 1.5 mg subcutaneously every 7 days.  - Adult Comprehensive Weight Management  Referral; Future    Type 2 diabetes mellitus without complication, without long-term current use of insulin (H)  Lab Results   Component Value Date    A1C 5.9 02/06/2025    A1C 6.0 11/18/2024    A1C 6.4 06/17/2024    A1C 6.0 03/06/2024    A1C 6.3 04/24/2023     Excellent response to wt reduction /treatment.     - dulaglutide (TRULICITY) 1.5 MG/0.5ML pen; Inject 1.5 mg subcutaneously every 7 days.  - Adult Comprehensive Weight Management  Referral; Future    NAFLD (nonalcoholic fatty liver disease)  Stable course with normalization of LFTs on GLP-1. Elastography ordered but result not available for my review. Treat underlying metabolic dysfunction with diet/exercise/wt reduction and dulaglutide    Hyperlipidemia LDL goal <100  High risk for CAD and mod-high intensity statin indicated.   Recent Labs   Lab Test 06/17/24  1750   CHOL 275*   HDL 61   *   TRIG 304*     The 10-year ASCVD risk score (Janiya CARVALHO, et al., 2019) is: 7.5%    Values used to calculate the score:      Age: 54 years      Sex: Female      Is Non- : No      Diabetic: Yes      Tobacco smoker: No      Systolic Blood Pressure: 138 mmHg      Is BP treated: Yes      HDL Cholesterol: 61 mg/dL      Total Cholesterol: 275 mg/dL    Rosuvastatin 10 mg/d ordered and she is not taking this due to SEs. Will need to try another in this class of statins or reduce to 5 mg and see if she tolerates it. If she doesn't  "may need CAC score to further risk stratify and consider PCSK9i if needed for risk reduction.     Hypertension, unspecified type  BP in target range today on amlodipine and losartan, no changes.     Abnormal uterine bleeding  S/P endometrial biopsy (benign findings). Menopausal symptoms have been severe and bleeding intermittent. Now with hormonal IUD and was told it would be 6 mos before things got back to normal. Follow clinically.    30 minutes spent on the date of the encounter performing chart review, history and exam, documentation and further activities as noted above.    Claudio Koehler MD  Primary Care Center  [facility]      BMI  Estimated body mass index is 34.7 kg/m  as calculated from the following:    Height as of this encounter: 1.651 m (5' 5\").    Weight as of this encounter: 94.6 kg (208 lb 8 oz).     Sebastien Steel is a 54 year old, presenting for the following health issues:  Follow Up (Pt here to follow up; review menopause, pt has not been able to get Ozempic/Trulicity, experiencing weight gain)      4/14/2025     2:22 PM   Additional Questions   Roomed by Yesika DAMON     History of Present Illness       Diabetes:   She presents for follow up of diabetes.  She is checking home blood glucose a few times a month.   She checks blood glucose before meals.  Blood glucose is never over 200 and never under 70.  When her blood glucose is low, the patient is asymptomatic for confusion, blurred vision, lethargy and reports not feeling dizzy, shaky, or weak.  She is concerned about other.   She is having weight gain.            Hypertension: She presents for follow up of hypertension.  She does not check blood pressure  regularly outside of the clinic. Outpatient blood pressures have not been over 140/90. She follows a low salt diet.     Reason for visit:  Follow up    She eats 0-1 servings of fruits and vegetables daily.She consumes 3 sweetened beverage(s) daily.She exercises with enough effort to " "increase her heart rate 9 or less minutes per day.  She exercises with enough effort to increase her heart rate 3 or less days per week. She is missing 1 dose(s) of medications per week.  She is not taking prescribed medications regularly due to remembering to take.        Review of Systems  Constitutional, HEENT, cardiovascular, pulmonary, gi and gu systems are negative, except as otherwise noted.      Objective    /84 (BP Location: Right arm, Patient Position: Sitting, Cuff Size: Adult Large)   Pulse 82   Temp 98  F (36.7  C) (Oral)   Resp 16   Ht 1.651 m (5' 5\")   Wt 94.6 kg (208 lb 8 oz)   SpO2 97%   BMI 34.70 kg/m    Body mass index is 34.7 kg/m .  Physical Exam   GENERAL: alert and no distress  NECK: no adenopathy, no asymmetry, masses, or scars  RESP: lungs clear to auscultation - no rales, rhonchi or wheezes  CV: regular rate and rhythm, normal S1 S2, no S3 or S4, no murmur, click or rub, no peripheral edema  ABDOMEN: soft, nontender, no hepatosplenomegaly, no masses and bowel sounds normal  MS: no gross musculoskeletal defects noted, no edema  SKIN: no suspicious lesions or rashes  NEURO: Normal strength and tone, mentation intact and speech normal  PSYCH: mentation appears normal, affect normal/bright  LYMPH: no cervical, supraclavicular, axillary, or inguinal adenopathy    Admission on 02/21/2025, Discharged on 02/21/2025   Component Date Value Ref Range Status    hCG Urine Qualitative 02/21/2025 Negative  Negative Final    This test is for screening purposes.  Results should be interpreted along with the clinical picture.  Confirmation testing is available if warranted by ordering [id number], HCG Quantitative Pregnancy.    GLUCOSE BY METER POCT 02/21/2025 109 (H)  70 - 99 mg/dL Final    Case Report 02/21/2025    Final                    Value:Surgical Pathology Report                         Case: [id number]                                  Authorizing Provider:  Estefania Masterson MD         " "Collected:           02/21/2025 09:44 AM          Ordering Location:     [facility]                 Received:            02/21/2025 11:31 AM          Pathologist:           Aspen Chanel MD                                                   Specimen:    Endometrium, Endometrium fibroids, polyp, and curettings                                   Final Diagnosis 02/21/2025    Final                    Value:Endometrium, \"fibroids, polyps and curettings\", hysteroscopic morcellation:  - Fragments of endometrial polyp(s)  - Fragments of smooth muscle consistent with submucosal leiomyoma  - Negative for atypia or malignancy      Clinical Information 02/21/2025    Final                    Value:The patient is a 54 year old woman with perimenopausal bleeding and thickened endometrial stripe. Operative findings; endometrial polyps and a submucosal leiomyoma (hysteroscopic resection with morcellator). Procedure: Hysteroscopic myomectomy using morcellator, placement of Mirena IUD.      Gross Description 02/21/2025    Final                    Value:A(1). Endometrium, Endometrium fibroids, polyp, and curettings:  The specimen is received in formalin with proper patient identification, labeled \"endometrial fibroids, polyps, and curettings\".  It consists of a cloth sac which contains a 3.3 x 3.0 x 0.5 cm aggregate of pink-tan soft tissue.  The specimen is wrapped and submitted entirely in cassettes A1-A2.      Microscopic Description 02/21/2025    Final                    Value:Microscopic examination is performed.     Case was reviewed by the following:  Pathology Fellow: Kelvin Chapin MD  A resident or fellow in a training program was involved in the initial review, preparation, and/or interpretation of this case.  I, as the senior physician, attest that I have personally reviewed all specimens and or slides, including the listed special stains, and used them with my medical judgement to determine the final " diagnosis.              Performing Labs 02/21/2025    Final                    Value:The technical component of this testing was completed at [facility].    Stain controls for all stains resulted within this report have been reviewed and show appropriate reactivity.              Signed Electronically by: Ehsan Koehler MD

## 2025-05-18 ENCOUNTER — HEALTH MAINTENANCE LETTER (OUTPATIENT)
Age: 55
End: 2025-05-18

## 2025-06-07 DIAGNOSIS — E66.811 CLASS 1 OBESITY WITH SERIOUS COMORBIDITY AND BODY MASS INDEX (BMI) OF 33.0 TO 33.9 IN ADULT, UNSPECIFIED OBESITY TYPE: ICD-10-CM

## 2025-06-07 DIAGNOSIS — E11.9 TYPE 2 DIABETES MELLITUS WITHOUT COMPLICATION, WITHOUT LONG-TERM CURRENT USE OF INSULIN (H): ICD-10-CM

## 2025-06-09 DIAGNOSIS — E11.9 TYPE 2 DIABETES MELLITUS WITHOUT COMPLICATION, WITHOUT LONG-TERM CURRENT USE OF INSULIN (H): ICD-10-CM

## 2025-06-09 DIAGNOSIS — E66.811 CLASS 1 OBESITY WITH SERIOUS COMORBIDITY AND BODY MASS INDEX (BMI) OF 33.0 TO 33.9 IN ADULT, UNSPECIFIED OBESITY TYPE: ICD-10-CM

## 2025-06-09 RX ORDER — DULAGLUTIDE 1.5 MG/.5ML
1.5 INJECTION, SOLUTION SUBCUTANEOUS
Refills: 0 | OUTPATIENT
Start: 2025-06-09

## 2025-06-09 RX ORDER — DULAGLUTIDE 1.5 MG/.5ML
1.5 INJECTION, SOLUTION SUBCUTANEOUS
Qty: 2 ML | Refills: 0 | Status: SHIPPED | OUTPATIENT
Start: 2025-06-09

## 2025-06-09 NOTE — TELEPHONE ENCOUNTER
EPA request sent.  Azul THORPE LPN  [facility]

## 2025-06-30 ENCOUNTER — OFFICE VISIT (OUTPATIENT)
Dept: OBGYN | Facility: CLINIC | Age: 55
End: 2025-06-30
Payer: COMMERCIAL

## 2025-06-30 VITALS
SYSTOLIC BLOOD PRESSURE: 137 MMHG | WEIGHT: 211 LBS | DIASTOLIC BLOOD PRESSURE: 81 MMHG | HEIGHT: 65 IN | BODY MASS INDEX: 35.16 KG/M2 | HEART RATE: 86 BPM

## 2025-06-30 DIAGNOSIS — N95.1 PERIMENOPAUSAL SYMPTOMS: Primary | ICD-10-CM

## 2025-06-30 PROCEDURE — 3075F SYST BP GE 130 - 139MM HG: CPT | Performed by: OBSTETRICS & GYNECOLOGY

## 2025-06-30 PROCEDURE — 3079F DIAST BP 80-89 MM HG: CPT | Performed by: OBSTETRICS & GYNECOLOGY

## 2025-06-30 PROCEDURE — 99204 OFFICE O/P NEW MOD 45 MIN: CPT | Performed by: OBSTETRICS & GYNECOLOGY

## 2025-06-30 RX ORDER — ESTRADIOL 0.1 MG/D
1 FILM, EXTENDED RELEASE TRANSDERMAL
Qty: 24 PATCH | Refills: 4 | Status: SHIPPED | OUTPATIENT
Start: 2025-06-30

## 2025-06-30 NOTE — PROGRESS NOTES
"Due to language barrier, an  was present during the history-taking and subsequent discussion with this patient on the ipad      CC:  bleeding  HPI:  Milvia Nieto is a 54 year old female No LMP recorded. Patient is postmenopausal.   Started bleeding in September with spotting every day and then had a real menses.  Went to a physician and was told she was in perimenopause.  Basically since then she is changing her pad/panty liner 1-2 times a day.  February 2025 underwent hysteroscopy with myomectomy and Mirena IUD placement.  She was placed on an estrogen patch at that time 0.025 mg for her hot flashes and sweating.  She states after 4 months of being on this estrogen she still is bleeding on a daily basis.  Here for second opinion.    Allergies: Seasonal allergies      EXAM:  Blood pressure 137/81, pulse 86, height 1.651 m (5' 5\"), weight 95.7 kg (211 lb), not currently breastfeeding.    General - pleasant female in no acute distress.  Psychiactric - appropriate mood and affect  Neurological - alert and oriented X 3    ASSESSMENT/PLAN:  prep time day of service 5 min  visit time with the patient 19 min  post visit work including documentation time 6 min  Total time: 30 min    (N95.1) Perimenopausal symptoms  (primary encounter diagnosis)  Comment: Mirena IUD 2/25  Plan: estradiol (VIVELLE-DOT) 0.1 MG/24HR bi-weekly         patch        Discussed the estrogen she is on is such as low-dose were likely is enough to irritate the lining of the uterus with the Mirena.  Will increase her patch to a more effective dose and hopefully this will help with her hot flashes and night sweats.  Discussed she should not be bleeding in the next month and if she is continues to bleed to let me know.  Questions were answered.  Reviewed how topical estrogen is different than oral estrogen for risk of clotting especially with her hypertensive disorder.      Priyanka Coronado MD  "

## 2025-07-03 DIAGNOSIS — N95.1 PERIMENOPAUSAL SYMPTOMS: ICD-10-CM

## 2025-07-08 ENCOUNTER — TELEPHONE (OUTPATIENT)
Dept: OBGYN | Facility: CLINIC | Age: 55
End: 2025-07-08
Payer: COMMERCIAL

## 2025-07-08 DIAGNOSIS — E66.811 CLASS 1 OBESITY WITH SERIOUS COMORBIDITY AND BODY MASS INDEX (BMI) OF 33.0 TO 33.9 IN ADULT, UNSPECIFIED OBESITY TYPE: ICD-10-CM

## 2025-07-08 DIAGNOSIS — E11.9 TYPE 2 DIABETES MELLITUS WITHOUT COMPLICATION, WITHOUT LONG-TERM CURRENT USE OF INSULIN (H): ICD-10-CM

## 2025-07-08 RX ORDER — ESTRADIOL 0.1 MG/D
FILM, EXTENDED RELEASE TRANSDERMAL
Qty: 24 PATCH | Refills: 4 | OUTPATIENT
Start: 2025-07-08

## 2025-07-08 NOTE — TELEPHONE ENCOUNTER
Prior Authorization Retail Medication Request    Medication/Dose: estradiol (VIVELLE-DOT) 0.1 MG/24HR bi-weekly patch   Diagnosis and ICD code (if different than what is on RX):    New/renewal/insurance change PA/secondary ins. PA:  Previously Tried and Failed:    Rationale:      Insurance   Primary:   Insurance ID:      Secondary (if applicable):  Insurance ID:      Pharmacy Information (if different than what is on RX)  Name:  [id number] [place] [facility] [address] [address] [place] [address]   Phone:  [phone]   Fax:  [phone]     Clinic Information  Preferred routing pool for dept communication: [facility]

## 2025-07-09 NOTE — TELEPHONE ENCOUNTER
Prior Authorization Not Needed per Insurance    Medication: estradiol (VIVELLE-DOT) 0.1 MG/24HR bi-weekly patch-PA NOT NEEDED   Insurance Company: [employer] Non-Specialty PA's - Phone [phone] Fax [phone]  Expected CoPay:      Pharmacy Filling [id number] [place], [place] - [address] [facility]  Pharmacy Notified:  Yes  Patient Notified:  No    Pharmacy stated that PA is Not Needed and Generic Estradiol Patch is covered. **Instructed pharmacy to notify patient when script is ready to /ship.** Pharmacy stated that they have a paid claim on medication and will notify the patient on medication.

## 2025-07-10 NOTE — TELEPHONE ENCOUNTER
TRULICITY 1.5 MG/0.5 ML PEN   Last Written Prescription:  6/9/25  2ml,0 refills  ----------------------  Last Visit Date: 4/14/25  Future Visit Date: None  --------------  [x] Medication unable to be refilled by RN due to: Other:  0 refills with last order    Request from pharmacy:  Requested Prescriptions   Pending Prescriptions Disp Refills    TRULICITY 1.5 MG/0.5ML pen [Pharmacy Med Name: TRULICITY 1.5 MG/0.5 ML PEN]       Sig: INJECT 1.5 MG SUBCUTANEOUSLY EVERY 7 DAYS.       GLP-1 Agonists Protocol Passed - 7/10/2025  7:28 AM        Passed - HgbA1C in past 3 or 6 months     If HgbA1C is 8 or greater, it needs to be on file within the past 3 months.  If less than 8, must be on file within the past 6 months.     Recent Labs   Lab Test 02/06/25  1431   A1C 5.9*                          Passed - Has GFR on file in past 12 months and most recent value is normal        Passed - Recent (6 month) or future (90 days) visit with the authorizing provider's specialty (provided they have been seen in the past 9 months)     The patient must have completed an in-person or virtual visit within the past 6 months or has a future visit scheduled within the next 90 days with the authorizing provider s specialty.  Urgent care and e-visits do not quality as an office visit for this protocol.          Passed - Medication indicated for associated diagnosis     Medication is associated with one or more of the following diagnoses:     Type 2 diabetes mellitus           Passed - Patient is age 18 or older        Passed - No active pregnancy on record        Passed - No positive pregnancy test in past 12 months

## 2025-07-22 RX ORDER — DULAGLUTIDE 1.5 MG/.5ML
1.5 INJECTION, SOLUTION SUBCUTANEOUS
Qty: 2 ML | Refills: 1 | Status: SHIPPED | OUTPATIENT
Start: 2025-07-22

## 2025-08-10 ENCOUNTER — MYC REFILL (OUTPATIENT)
Dept: INTERNAL MEDICINE | Facility: CLINIC | Age: 55
End: 2025-08-10
Payer: COMMERCIAL

## 2025-08-10 ENCOUNTER — HEALTH MAINTENANCE LETTER (OUTPATIENT)
Age: 55
End: 2025-08-10

## 2025-08-10 DIAGNOSIS — E66.811 CLASS 1 OBESITY WITH SERIOUS COMORBIDITY AND BODY MASS INDEX (BMI) OF 33.0 TO 33.9 IN ADULT, UNSPECIFIED OBESITY TYPE: ICD-10-CM

## 2025-08-10 DIAGNOSIS — E11.9 TYPE 2 DIABETES MELLITUS WITHOUT COMPLICATION, WITHOUT LONG-TERM CURRENT USE OF INSULIN (H): ICD-10-CM

## 2025-08-10 RX ORDER — DULAGLUTIDE 1.5 MG/.5ML
1.5 INJECTION, SOLUTION SUBCUTANEOUS
Qty: 2 ML | Refills: 1 | Status: CANCELLED | OUTPATIENT
Start: 2025-08-10

## 2025-08-16 ENCOUNTER — MYC REFILL (OUTPATIENT)
Dept: INTERNAL MEDICINE | Facility: CLINIC | Age: 55
End: 2025-08-16
Payer: COMMERCIAL

## 2025-08-16 DIAGNOSIS — I10 HTN (HYPERTENSION): Primary | ICD-10-CM

## 2025-08-18 RX ORDER — LOSARTAN POTASSIUM 100 MG/1
100 TABLET ORAL DAILY
Qty: 90 TABLET | Refills: 2 | Status: SHIPPED | OUTPATIENT
Start: 2025-08-18

## 2025-08-31 ENCOUNTER — HEALTH MAINTENANCE LETTER (OUTPATIENT)
Age: 55
End: 2025-08-31

## (undated) DEVICE — STRAP STIRRUP W/SLIP 30187-030

## (undated) DEVICE — SPONGE LAP 18X18" X8435

## (undated) DEVICE — SU VICRYL 2-0 CT-1 27" UND J259H

## (undated) DEVICE — GLOVE BIOGEL PI MICRO INDICATOR UNDERGLOVE SZ 6.5 48965

## (undated) DEVICE — SUCTION IRR SYSTEM W/O TIP INTERPULSE HANDPIECE 0210-100-000

## (undated) DEVICE — SU ETHIBOND 5 V-37 4X30" MB66G

## (undated) DEVICE — SOLIDIFIER (USE FOR UP TO 1500CC) MSOLID1500

## (undated) DEVICE — LINEN MAYO STAND COVER OVERSIZE PACK 5458

## (undated) DEVICE — PREP DYNA-HEX 4% CHG SCRUB 4OZ BOTTLE MDS098710

## (undated) DEVICE — SOL NACL 0.9% IRRIG 1000ML BOTTLE 2F7124

## (undated) DEVICE — LINEN ORTHO PACK 5446

## (undated) DEVICE — PREP CHLORAPREP 26ML TINTED HI-LITE ORANGE 930815

## (undated) DEVICE — KIT PROCEDURE FLUENT IN/OUT FLOWPAK TISS TRAP FLT-112S

## (undated) DEVICE — DRSG STERI STRIP 1/2X4" R1547

## (undated) DEVICE — DRAPE IOBAN INCISE 36X23" 6651EZ

## (undated) DEVICE — GLOVE PROTEXIS POWDER FREE 7.5 ORTHOPEDIC 2D73ET75

## (undated) DEVICE — SU VICRYL 0 CT 36" J358H

## (undated) DEVICE — BLADE SAW RECIP STRK 70X6X0.025MM 0277-096-250S5

## (undated) DEVICE — Device

## (undated) DEVICE — GLOVE PROTEXIS BLUE W/NEU-THERA 8.0  2D73EB80

## (undated) DEVICE — LINEN TOWEL PACK X5 5464

## (undated) DEVICE — SOL WATER IRRIG 1000ML BOTTLE 2F7114

## (undated) DEVICE — GLOVE PROTEXIS W/NEU-THERA 8.0  2D73TE80

## (undated) DEVICE — GLOVE PROTEXIS BLUE W/NEU-THERA 6.5  2D73EB65

## (undated) DEVICE — STRAP KNEE/BODY 31143004

## (undated) DEVICE — BONE CLEANING TIP INTERPULSE  0210-010-000

## (undated) DEVICE — ADH SKIN CLOSURE PREMIERPRO EXOFIN 1.0ML 3470

## (undated) DEVICE — SU MONOCRYL 3-0 PS-1 27" Y936H

## (undated) DEVICE — DRSG TEGADERM ALGINATE AG 4X5" 90303

## (undated) DEVICE — SUCTION MANIFOLD NEPTUNE 2 SYS 4 PORT 0702-020-000

## (undated) DEVICE — HOOD FLYTE W/PEELAWAY 408-800-100

## (undated) DEVICE — GLOVE BIOGEL PI ULTRATOUCH G SZ 6.0 42160

## (undated) DEVICE — DEVICE RETRIEVER HEWSON 71111579

## (undated) DEVICE — DRILL BIT FLEXIBLE REFLECTION 35MM 71362935

## (undated) DEVICE — DRSG TEGADERM 4X10" 1627

## (undated) DEVICE — DECANTER VIAL 2006S

## (undated) DEVICE — SEAL SET MYOSURE ROD LENS SCOPE SINGLE USE 40-902

## (undated) DEVICE — BLADE KNIFE SURG 20 371120

## (undated) DEVICE — ESU PENCIL W/SMOKE EVAC NEPTUNE STRYKER 0703-046-000

## (undated) DEVICE — POSITIONER ABDUCTION PILLOW FOAM MED FP-ABDUCTM

## (undated) DEVICE — GOWN IMPERVIOUS SPECIALTY XLG/XLONG 32474

## (undated) DEVICE — LINEN GOWN X4 5410

## (undated) DEVICE — SOL NACL 0.9% INJ 1000ML BAG 2B1324X

## (undated) DEVICE — GOWN XLG DISP 9545

## (undated) DEVICE — DRAPE UNDER BUTTOCK 8483

## (undated) DEVICE — SOL NACL 0.9% IRRIG 3000ML BAG 2B7477

## (undated) DEVICE — PACK TOTAL HIP W/POUCH RIVERSIDE LATEX FREE

## (undated) DEVICE — ESU GROUND PAD ADULT W/CORD E7507

## (undated) DEVICE — GLOVE PROTEXIS W/NEU-THERA 6.0  2D73TE60

## (undated) DEVICE — DRAPE STERI TOWEL SM 1000

## (undated) DEVICE — PAD CHUX UNDERPAD 30X36" P3036C

## (undated) DEVICE — DRSG KERLIX 4 1/2"X4YDS ROLL 6730

## (undated) RX ORDER — LIDOCAINE HYDROCHLORIDE AND EPINEPHRINE 5; 5 MG/ML; UG/ML
INJECTION, SOLUTION INFILTRATION; PERINEURAL
Status: DISPENSED
Start: 2025-02-21

## (undated) RX ORDER — FENTANYL CITRATE-0.9 % NACL/PF 10 MCG/ML
PLASTIC BAG, INJECTION (ML) INTRAVENOUS
Status: DISPENSED
Start: 2022-03-04

## (undated) RX ORDER — OXYCODONE HYDROCHLORIDE 5 MG/1
TABLET ORAL
Status: DISPENSED
Start: 2022-05-04

## (undated) RX ORDER — ONDANSETRON 2 MG/ML
INJECTION INTRAMUSCULAR; INTRAVENOUS
Status: DISPENSED
Start: 2025-02-21

## (undated) RX ORDER — FENTANYL CITRATE 50 UG/ML
INJECTION, SOLUTION INTRAMUSCULAR; INTRAVENOUS
Status: DISPENSED
Start: 2022-05-04

## (undated) RX ORDER — PROPOFOL 10 MG/ML
INJECTION, EMULSION INTRAVENOUS
Status: DISPENSED
Start: 2025-02-21

## (undated) RX ORDER — LIDOCAINE HCL/EPINEPHRINE/PF 2%-1:200K
VIAL (ML) INJECTION
Status: DISPENSED
Start: 2022-03-04

## (undated) RX ORDER — KETOROLAC TROMETHAMINE 30 MG/ML
INJECTION, SOLUTION INTRAMUSCULAR; INTRAVENOUS
Status: DISPENSED
Start: 2025-02-21

## (undated) RX ORDER — ACETAMINOPHEN 325 MG/1
TABLET ORAL
Status: DISPENSED
Start: 2022-05-04

## (undated) RX ORDER — FENTANYL CITRATE 50 UG/ML
INJECTION, SOLUTION INTRAMUSCULAR; INTRAVENOUS
Status: DISPENSED
Start: 2025-02-21

## (undated) RX ORDER — OXYCODONE HYDROCHLORIDE 5 MG/1
TABLET ORAL
Status: DISPENSED
Start: 2025-02-21

## (undated) RX ORDER — LIDOCAINE HYDROCHLORIDE AND EPINEPHRINE 10; 10 MG/ML; UG/ML
INJECTION, SOLUTION INFILTRATION; PERINEURAL
Status: DISPENSED
Start: 2025-02-21

## (undated) RX ORDER — HYDROMORPHONE HYDROCHLORIDE 1 MG/ML
INJECTION, SOLUTION INTRAMUSCULAR; INTRAVENOUS; SUBCUTANEOUS
Status: DISPENSED
Start: 2022-05-04

## (undated) RX ORDER — FENTANYL CITRATE-0.9 % NACL/PF 10 MCG/ML
PLASTIC BAG, INJECTION (ML) INTRAVENOUS
Status: DISPENSED
Start: 2022-05-04

## (undated) RX ORDER — ACETAMINOPHEN 325 MG/1
TABLET ORAL
Status: DISPENSED
Start: 2025-02-21